# Patient Record
Sex: FEMALE | Race: WHITE | NOT HISPANIC OR LATINO | Employment: FULL TIME | URBAN - METROPOLITAN AREA
[De-identification: names, ages, dates, MRNs, and addresses within clinical notes are randomized per-mention and may not be internally consistent; named-entity substitution may affect disease eponyms.]

---

## 2017-02-07 ENCOUNTER — HISTORICAL (OUTPATIENT)
Dept: INFUSION THERAPY | Facility: HOSPITAL | Age: 52
End: 2017-02-07

## 2017-03-10 ENCOUNTER — HISTORICAL (OUTPATIENT)
Dept: HEMATOLOGY/ONCOLOGY | Facility: CLINIC | Age: 52
End: 2017-03-10

## 2017-04-03 ENCOUNTER — HISTORICAL (OUTPATIENT)
Dept: INFUSION THERAPY | Facility: HOSPITAL | Age: 52
End: 2017-04-03

## 2017-06-05 ENCOUNTER — HISTORICAL (OUTPATIENT)
Dept: INFUSION THERAPY | Facility: HOSPITAL | Age: 52
End: 2017-06-05

## 2017-07-31 ENCOUNTER — HISTORICAL (OUTPATIENT)
Dept: INFUSION THERAPY | Facility: HOSPITAL | Age: 52
End: 2017-07-31

## 2017-09-05 ENCOUNTER — HISTORICAL (OUTPATIENT)
Dept: HEMATOLOGY/ONCOLOGY | Facility: CLINIC | Age: 52
End: 2017-09-05

## 2017-09-05 LAB
ABS NEUT (OLG): 3.16 X10(3)/MCL (ref 2.1–9.2)
ALBUMIN SERPL-MCNC: 3.6 GM/DL (ref 3.4–5)
ALBUMIN/GLOB SERPL: 1.1 RATIO (ref 1.1–2)
ALP SERPL-CCNC: 129 UNIT/L (ref 38–126)
ALT SERPL-CCNC: 21 UNIT/L (ref 12–78)
AST SERPL-CCNC: 19 UNIT/L (ref 15–37)
BASOPHILS # BLD AUTO: 0 X10(3)/MCL (ref 0–0.2)
BASOPHILS NFR BLD AUTO: 0.5 %
BILIRUB SERPL-MCNC: 0.5 MG/DL (ref 0.2–1)
BILIRUBIN DIRECT+TOT PNL SERPL-MCNC: 0.1 MG/DL (ref 0–0.5)
BILIRUBIN DIRECT+TOT PNL SERPL-MCNC: 0.4 MG/DL (ref 0–0.8)
BUN SERPL-MCNC: 15 MG/DL (ref 7–18)
CALCIUM SERPL-MCNC: 9.4 MG/DL (ref 8.5–10.1)
CEA SERPL-MCNC: 2 NG/ML (ref 0–3)
CHLORIDE SERPL-SCNC: 103 MMOL/L (ref 98–107)
CO2 SERPL-SCNC: 29 MMOL/L (ref 21–32)
CREAT SERPL-MCNC: 0.54 MG/DL (ref 0.55–1.02)
EOSINOPHIL # BLD AUTO: 0.2 X10(3)/MCL (ref 0–0.9)
EOSINOPHIL NFR BLD AUTO: 3.8 %
ERYTHROCYTE [DISTWIDTH] IN BLOOD BY AUTOMATED COUNT: 13.6 % (ref 11.5–17)
GLOBULIN SER-MCNC: 3.3 GM/DL (ref 2.4–3.5)
GLUCOSE SERPL-MCNC: 79 MG/DL (ref 74–106)
HCT VFR BLD AUTO: 40.4 % (ref 37–47)
HGB BLD-MCNC: 13.2 GM/DL (ref 12–16)
LYMPHOCYTES # BLD AUTO: 2 X10(3)/MCL (ref 0.6–4.6)
LYMPHOCYTES NFR BLD AUTO: 32.9 %
MCH RBC QN AUTO: 29.9 PG (ref 27–31)
MCHC RBC AUTO-ENTMCNC: 32.7 GM/DL (ref 33–36)
MCV RBC AUTO: 91.4 FL (ref 80–94)
MONOCYTES # BLD AUTO: 0.8 X10(3)/MCL (ref 0.1–1.3)
MONOCYTES NFR BLD AUTO: 12.2 %
NEUTROPHILS # BLD AUTO: 3.2 X10(3)/MCL (ref 2.1–9.2)
NEUTROPHILS NFR BLD AUTO: 50.6 %
PLATELET # BLD AUTO: 357 X10(3)/MCL (ref 130–400)
PMV BLD AUTO: 8.4 FL (ref 9.4–12.4)
POTASSIUM SERPL-SCNC: 4 MMOL/L (ref 3.5–5.1)
PROT SERPL-MCNC: 6.9 GM/DL (ref 6.4–8.2)
RBC # BLD AUTO: 4.42 X10(6)/MCL (ref 4.2–5.4)
SODIUM SERPL-SCNC: 142 MMOL/L (ref 136–145)
WBC # SPEC AUTO: 6.2 X10(3)/MCL (ref 4.5–11.5)

## 2017-11-08 ENCOUNTER — HISTORICAL (OUTPATIENT)
Dept: RADIOLOGY | Facility: HOSPITAL | Age: 52
End: 2017-11-08

## 2018-03-07 ENCOUNTER — HISTORICAL (OUTPATIENT)
Dept: HEMATOLOGY/ONCOLOGY | Facility: CLINIC | Age: 53
End: 2018-03-07

## 2018-03-07 LAB
ABS NEUT (OLG): 2.9 X10(3)/MCL (ref 2.1–9.2)
ALBUMIN SERPL-MCNC: 3.9 GM/DL (ref 3.4–5)
ALBUMIN/GLOB SERPL: 1.1 {RATIO}
ALP SERPL-CCNC: 106 UNIT/L (ref 38–126)
ALT SERPL-CCNC: 26 UNIT/L (ref 12–78)
AST SERPL-CCNC: 16 UNIT/L (ref 15–37)
BASOPHILS # BLD AUTO: 0 X10(3)/MCL (ref 0–0.2)
BASOPHILS NFR BLD AUTO: 0.8 %
BILIRUB SERPL-MCNC: 0.6 MG/DL (ref 0.2–1)
BILIRUBIN DIRECT+TOT PNL SERPL-MCNC: 0.2 MG/DL (ref 0–0.2)
BILIRUBIN DIRECT+TOT PNL SERPL-MCNC: 0.4 MG/DL (ref 0–0.8)
BUN SERPL-MCNC: 14 MG/DL (ref 7–18)
CALCIUM SERPL-MCNC: 9.4 MG/DL (ref 8.5–10.1)
CEA SERPL-MCNC: 2 NG/ML (ref 0–3)
CHLORIDE SERPL-SCNC: 104 MMOL/L (ref 98–107)
CO2 SERPL-SCNC: 32 MMOL/L (ref 21–32)
CREAT SERPL-MCNC: 0.59 MG/DL (ref 0.55–1.02)
EOSINOPHIL # BLD AUTO: 0.2 X10(3)/MCL (ref 0–0.9)
EOSINOPHIL NFR BLD AUTO: 3.6 %
ERYTHROCYTE [DISTWIDTH] IN BLOOD BY AUTOMATED COUNT: 13.9 % (ref 11.5–17)
GLOBULIN SER-MCNC: 3.6 GM/DL (ref 2.4–3.5)
GLUCOSE SERPL-MCNC: 60 MG/DL (ref 74–106)
HCT VFR BLD AUTO: 45.8 % (ref 37–47)
HGB BLD-MCNC: 14.8 GM/DL (ref 12–16)
LYMPHOCYTES # BLD AUTO: 1.6 X10(3)/MCL (ref 0.6–4.6)
LYMPHOCYTES NFR BLD AUTO: 30.4 %
MCH RBC QN AUTO: 30 PG (ref 27–31)
MCHC RBC AUTO-ENTMCNC: 32.3 GM/DL (ref 33–36)
MCV RBC AUTO: 92.7 FL (ref 80–94)
MONOCYTES # BLD AUTO: 0.5 X10(3)/MCL (ref 0.1–1.3)
MONOCYTES NFR BLD AUTO: 10.2 %
NEUTROPHILS # BLD AUTO: 2.9 X10(3)/MCL (ref 2.1–9.2)
NEUTROPHILS NFR BLD AUTO: 55 %
PLATELET # BLD AUTO: 338 X10(3)/MCL (ref 130–400)
PMV BLD AUTO: 8.3 FL (ref 9.4–12.4)
POC CREATININE: 0.7 MG/DL (ref 0.6–1.3)
POTASSIUM SERPL-SCNC: 4.2 MMOL/L (ref 3.5–5.1)
PROT SERPL-MCNC: 7.5 GM/DL (ref 6.4–8.2)
RBC # BLD AUTO: 4.94 X10(6)/MCL (ref 4.2–5.4)
SODIUM SERPL-SCNC: 140 MMOL/L (ref 136–145)
WBC # SPEC AUTO: 5.3 X10(3)/MCL (ref 4.5–11.5)

## 2018-09-04 ENCOUNTER — HISTORICAL (OUTPATIENT)
Dept: HEMATOLOGY/ONCOLOGY | Facility: CLINIC | Age: 53
End: 2018-09-04

## 2018-09-04 LAB
ABS NEUT (OLG): 3.45 X10(3)/MCL (ref 2.1–9.2)
ALBUMIN SERPL-MCNC: 3.8 GM/DL (ref 3.4–5)
ALBUMIN/GLOB SERPL: 1.1 {RATIO}
ALP SERPL-CCNC: 105 UNIT/L (ref 38–126)
ALT SERPL-CCNC: 30 UNIT/L (ref 12–78)
AST SERPL-CCNC: 25 UNIT/L (ref 15–37)
BASOPHILS # BLD AUTO: 0 X10(3)/MCL (ref 0–0.2)
BASOPHILS NFR BLD AUTO: 0.7 %
BILIRUB SERPL-MCNC: 0.5 MG/DL (ref 0.2–1)
BILIRUBIN DIRECT+TOT PNL SERPL-MCNC: 0.1 MG/DL (ref 0–0.2)
BILIRUBIN DIRECT+TOT PNL SERPL-MCNC: 0.4 MG/DL (ref 0–0.8)
BUN SERPL-MCNC: 18 MG/DL (ref 7–18)
CALCIUM SERPL-MCNC: 9.7 MG/DL (ref 8.5–10.1)
CEA SERPL-MCNC: 1.6 NG/ML (ref 0–3)
CHLORIDE SERPL-SCNC: 103 MMOL/L (ref 98–107)
CO2 SERPL-SCNC: 31 MMOL/L (ref 21–32)
CREAT SERPL-MCNC: 0.58 MG/DL (ref 0.55–1.02)
EOSINOPHIL # BLD AUTO: 0.2 X10(3)/MCL (ref 0–0.9)
EOSINOPHIL NFR BLD AUTO: 3.6 %
ERYTHROCYTE [DISTWIDTH] IN BLOOD BY AUTOMATED COUNT: 13.6 % (ref 11.5–17)
GLOBULIN SER-MCNC: 3.5 GM/DL (ref 2.4–3.5)
GLUCOSE SERPL-MCNC: 90 MG/DL (ref 74–106)
HCT VFR BLD AUTO: 41.6 % (ref 37–47)
HGB BLD-MCNC: 13.3 GM/DL (ref 12–16)
LYMPHOCYTES # BLD AUTO: 2.3 X10(3)/MCL (ref 0.6–4.6)
LYMPHOCYTES NFR BLD AUTO: 33 %
MCH RBC QN AUTO: 29.6 PG (ref 27–31)
MCHC RBC AUTO-ENTMCNC: 32 GM/DL (ref 33–36)
MCV RBC AUTO: 92.7 FL (ref 80–94)
MONOCYTES # BLD AUTO: 0.8 X10(3)/MCL (ref 0.1–1.3)
MONOCYTES NFR BLD AUTO: 12.4 %
NEUTROPHILS # BLD AUTO: 3.4 X10(3)/MCL (ref 2.1–9.2)
NEUTROPHILS NFR BLD AUTO: 50.3 %
PLATELET # BLD AUTO: 372 X10(3)/MCL (ref 130–400)
PMV BLD AUTO: 8.5 FL (ref 9.4–12.4)
POTASSIUM SERPL-SCNC: 4 MMOL/L (ref 3.5–5.1)
PROT SERPL-MCNC: 7.3 GM/DL (ref 6.4–8.2)
RBC # BLD AUTO: 4.49 X10(6)/MCL (ref 4.2–5.4)
SODIUM SERPL-SCNC: 139 MMOL/L (ref 136–145)
WBC # SPEC AUTO: 6.9 X10(3)/MCL (ref 4.5–11.5)

## 2018-12-18 ENCOUNTER — HISTORICAL (OUTPATIENT)
Dept: RADIOLOGY | Facility: HOSPITAL | Age: 53
End: 2018-12-18

## 2019-01-08 ENCOUNTER — HISTORICAL (OUTPATIENT)
Dept: RADIOLOGY | Facility: HOSPITAL | Age: 54
End: 2019-01-08

## 2019-03-14 ENCOUNTER — HISTORICAL (OUTPATIENT)
Dept: HEMATOLOGY/ONCOLOGY | Facility: CLINIC | Age: 54
End: 2019-03-14

## 2019-03-14 LAB
ABS NEUT (OLG): 3.23 X10(3)/MCL (ref 2.1–9.2)
ALBUMIN SERPL-MCNC: 3.6 GM/DL (ref 3.4–5)
ALBUMIN/GLOB SERPL: 1 {RATIO}
ALP SERPL-CCNC: 95 UNIT/L (ref 38–126)
ALT SERPL-CCNC: 19 UNIT/L (ref 12–78)
AST SERPL-CCNC: 13 UNIT/L (ref 15–37)
BASOPHILS # BLD AUTO: 0.1 X10(3)/MCL (ref 0–0.2)
BASOPHILS NFR BLD AUTO: 1.2 %
BILIRUB SERPL-MCNC: 0.7 MG/DL (ref 0.2–1)
BILIRUBIN DIRECT+TOT PNL SERPL-MCNC: 0.1 MG/DL (ref 0–0.2)
BILIRUBIN DIRECT+TOT PNL SERPL-MCNC: 0.6 MG/DL (ref 0–0.8)
BUN SERPL-MCNC: 14 MG/DL (ref 7–18)
CALCIUM SERPL-MCNC: 8.9 MG/DL (ref 8.5–10.1)
CEA SERPL-MCNC: 1.2 NG/ML (ref 0–3)
CHLORIDE SERPL-SCNC: 105 MMOL/L (ref 98–107)
CO2 SERPL-SCNC: 29 MMOL/L (ref 21–32)
CREAT SERPL-MCNC: 0.76 MG/DL (ref 0.55–1.02)
EOSINOPHIL # BLD AUTO: 0.2 X10(3)/MCL (ref 0–0.9)
EOSINOPHIL NFR BLD AUTO: 2.7 %
ERYTHROCYTE [DISTWIDTH] IN BLOOD BY AUTOMATED COUNT: 12.8 % (ref 11.5–17)
GLOBULIN SER-MCNC: 3.5 GM/DL (ref 2.4–3.5)
GLUCOSE SERPL-MCNC: 75 MG/DL (ref 74–106)
HCT VFR BLD AUTO: 40.3 % (ref 37–47)
HGB BLD-MCNC: 12.6 GM/DL (ref 12–16)
LYMPHOCYTES # BLD AUTO: 1.8 X10(3)/MCL (ref 0.6–4.6)
LYMPHOCYTES NFR BLD AUTO: 29.7 %
MCH RBC QN AUTO: 28.6 PG (ref 27–31)
MCHC RBC AUTO-ENTMCNC: 31.3 GM/DL (ref 33–36)
MCV RBC AUTO: 91.4 FL (ref 80–94)
MONOCYTES # BLD AUTO: 0.8 X10(3)/MCL (ref 0.1–1.3)
MONOCYTES NFR BLD AUTO: 12.6 %
NEUTROPHILS # BLD AUTO: 3.2 X10(3)/MCL (ref 2.1–9.2)
NEUTROPHILS NFR BLD AUTO: 53.5 %
PLATELET # BLD AUTO: 492 X10(3)/MCL (ref 130–400)
PMV BLD AUTO: 8.1 FL (ref 9.4–12.4)
POC CREATININE: 0.7 MG/DL (ref 0.6–1.3)
POTASSIUM SERPL-SCNC: 3.9 MMOL/L (ref 3.5–5.1)
PROT SERPL-MCNC: 7.1 GM/DL (ref 6.4–8.2)
RBC # BLD AUTO: 4.41 X10(6)/MCL (ref 4.2–5.4)
SODIUM SERPL-SCNC: 141 MMOL/L (ref 136–145)
WBC # SPEC AUTO: 6 X10(3)/MCL (ref 4.5–11.5)

## 2019-06-20 ENCOUNTER — HISTORICAL (OUTPATIENT)
Dept: RADIOLOGY | Facility: HOSPITAL | Age: 54
End: 2019-06-20

## 2019-09-05 ENCOUNTER — HISTORICAL (OUTPATIENT)
Dept: HEMATOLOGY/ONCOLOGY | Facility: CLINIC | Age: 54
End: 2019-09-05

## 2019-09-05 LAB
ABS NEUT (OLG): 3.6 X10(3)/MCL (ref 2.1–9.2)
ALBUMIN SERPL-MCNC: 4.1 GM/DL (ref 3.4–5)
ALBUMIN/GLOB SERPL: 1.3 {RATIO}
ALP SERPL-CCNC: 98 UNIT/L (ref 38–126)
ALT SERPL-CCNC: 27 UNIT/L (ref 12–78)
AST SERPL-CCNC: 19 UNIT/L (ref 15–37)
BASOPHILS # BLD AUTO: 0 X10(3)/MCL (ref 0–0.2)
BASOPHILS NFR BLD AUTO: 0.7 %
BILIRUB SERPL-MCNC: 0.8 MG/DL (ref 0.2–1)
BILIRUBIN DIRECT+TOT PNL SERPL-MCNC: 0.2 MG/DL (ref 0–0.2)
BILIRUBIN DIRECT+TOT PNL SERPL-MCNC: 0.6 MG/DL (ref 0–0.8)
BUN SERPL-MCNC: 16 MG/DL (ref 7–18)
CALCIUM SERPL-MCNC: 10 MG/DL (ref 8.5–10.1)
CEA SERPL-MCNC: 1.4 NG/ML (ref 0–3)
CHLORIDE SERPL-SCNC: 104 MMOL/L (ref 98–107)
CO2 SERPL-SCNC: 29 MMOL/L (ref 21–32)
CREAT SERPL-MCNC: 0.64 MG/DL (ref 0.55–1.02)
EOSINOPHIL # BLD AUTO: 0.2 X10(3)/MCL (ref 0–0.9)
EOSINOPHIL NFR BLD AUTO: 3.6 %
ERYTHROCYTE [DISTWIDTH] IN BLOOD BY AUTOMATED COUNT: 13.1 % (ref 11.5–17)
GLOBULIN SER-MCNC: 3.2 GM/DL (ref 2.4–3.5)
GLUCOSE SERPL-MCNC: 86 MG/DL (ref 74–106)
HCT VFR BLD AUTO: 43.6 % (ref 37–47)
HGB BLD-MCNC: 13.9 GM/DL (ref 12–16)
LYMPHOCYTES # BLD AUTO: 2.3 X10(3)/MCL (ref 0.6–4.6)
LYMPHOCYTES NFR BLD AUTO: 33 %
MCH RBC QN AUTO: 29.4 PG (ref 27–31)
MCHC RBC AUTO-ENTMCNC: 31.9 GM/DL (ref 33–36)
MCV RBC AUTO: 92.4 FL (ref 80–94)
MONOCYTES # BLD AUTO: 0.7 X10(3)/MCL (ref 0.1–1.3)
MONOCYTES NFR BLD AUTO: 9.9 %
NEUTROPHILS # BLD AUTO: 3.6 X10(3)/MCL (ref 2.1–9.2)
NEUTROPHILS NFR BLD AUTO: 52.5 %
PLATELET # BLD AUTO: 359 X10(3)/MCL (ref 130–400)
PMV BLD AUTO: 8.2 FL (ref 9.4–12.4)
POTASSIUM SERPL-SCNC: 4.6 MMOL/L (ref 3.5–5.1)
PROT SERPL-MCNC: 7.3 GM/DL (ref 6.4–8.2)
RBC # BLD AUTO: 4.72 X10(6)/MCL (ref 4.2–5.4)
SODIUM SERPL-SCNC: 139 MMOL/L (ref 136–145)
WBC # SPEC AUTO: 6.9 X10(3)/MCL (ref 4.5–11.5)

## 2020-03-12 ENCOUNTER — HISTORICAL (OUTPATIENT)
Dept: HEMATOLOGY/ONCOLOGY | Facility: CLINIC | Age: 55
End: 2020-03-12

## 2020-03-12 LAB
ABS NEUT (OLG): 3.31 X10(3)/MCL (ref 2.1–9.2)
ALBUMIN SERPL-MCNC: 3.5 GM/DL (ref 3.4–5)
ALBUMIN/GLOB SERPL: 1.1 {RATIO}
ALP SERPL-CCNC: 94 UNIT/L (ref 38–126)
ALT SERPL-CCNC: 26 UNIT/L (ref 12–78)
AST SERPL-CCNC: 16 UNIT/L (ref 15–37)
BASOPHILS # BLD AUTO: 0 X10(3)/MCL (ref 0–0.2)
BASOPHILS NFR BLD AUTO: 0.6 %
BILIRUB SERPL-MCNC: 0.7 MG/DL (ref 0.2–1)
BILIRUBIN DIRECT+TOT PNL SERPL-MCNC: 0.1 MG/DL (ref 0–0.2)
BILIRUBIN DIRECT+TOT PNL SERPL-MCNC: 0.6 MG/DL (ref 0–0.8)
BUN SERPL-MCNC: 14 MG/DL (ref 7–18)
CALCIUM SERPL-MCNC: 8.8 MG/DL (ref 8.5–10.1)
CEA SERPL-MCNC: 1.4 NG/ML (ref 0–3)
CHLORIDE SERPL-SCNC: 105 MMOL/L (ref 98–107)
CO2 SERPL-SCNC: 29 MMOL/L (ref 21–32)
CREAT SERPL-MCNC: 0.56 MG/DL (ref 0.55–1.02)
EOSINOPHIL # BLD AUTO: 0.2 X10(3)/MCL (ref 0–0.9)
EOSINOPHIL NFR BLD AUTO: 3.1 %
ERYTHROCYTE [DISTWIDTH] IN BLOOD BY AUTOMATED COUNT: 13.1 % (ref 11.5–17)
GLOBULIN SER-MCNC: 3.3 GM/DL (ref 2.4–3.5)
GLUCOSE SERPL-MCNC: 91 MG/DL (ref 74–106)
HCT VFR BLD AUTO: 37.2 % (ref 37–47)
HGB BLD-MCNC: 11.9 GM/DL (ref 12–16)
LYMPHOCYTES # BLD AUTO: 2.3 X10(3)/MCL (ref 0.6–4.6)
LYMPHOCYTES NFR BLD AUTO: 35.3 %
MCH RBC QN AUTO: 29.5 PG (ref 27–31)
MCHC RBC AUTO-ENTMCNC: 32 GM/DL (ref 33–36)
MCV RBC AUTO: 92.3 FL (ref 80–94)
MONOCYTES # BLD AUTO: 0.7 X10(3)/MCL (ref 0.1–1.3)
MONOCYTES NFR BLD AUTO: 10.1 %
NEUTROPHILS # BLD AUTO: 3.3 X10(3)/MCL (ref 2.1–9.2)
NEUTROPHILS NFR BLD AUTO: 50.6 %
PLATELET # BLD AUTO: 341 X10(3)/MCL (ref 130–400)
PMV BLD AUTO: 8 FL (ref 9.4–12.4)
POTASSIUM SERPL-SCNC: 4.1 MMOL/L (ref 3.5–5.1)
PROT SERPL-MCNC: 6.8 GM/DL (ref 6.4–8.2)
RBC # BLD AUTO: 4.03 X10(6)/MCL (ref 4.2–5.4)
SODIUM SERPL-SCNC: 138 MMOL/L (ref 136–145)
WBC # SPEC AUTO: 6.5 X10(3)/MCL (ref 4.5–11.5)

## 2021-08-06 ENCOUNTER — HISTORICAL (OUTPATIENT)
Dept: ADMINISTRATIVE | Facility: HOSPITAL | Age: 56
End: 2021-08-06

## 2021-08-06 LAB — SARS-COV-2 RNA RESP QL NAA+PROBE: NOT DETECTED

## 2021-10-05 LAB
PAP RECOMMENDATION EXT: NORMAL
PAP SMEAR: NORMAL

## 2022-11-10 LAB
HUMAN PAPILLOMAVIRUS (HPV): NORMAL
PAP RECOMMENDATION EXT: NORMAL
PAP SMEAR: NORMAL

## 2023-05-12 DIAGNOSIS — Z85.048 PERSONAL HISTORY OF RECTAL CANCER: Primary | ICD-10-CM

## 2023-05-24 NOTE — PROGRESS NOTES
Subjective:       Patient ID: Alondra Snyder is a 58 y.o. female.    Chief Complaint:  Here to reestablish follow-up    Diagnosis: Stage IIIA rectal carcinoma 3/15 (T2 N1b M0); 3/28+ nodes                    + COVID 19 vaccinated     Treatment History  Oxaliplatin/Xeloda x6 completed 8/15  --> Xeloda/XRT completed 11/11/15    Current Treatment: Lost to follow-up     Clinical History: Patient was referred for a first time screening colonoscopy at the age of 49 by her gynecologist.  She had no abdominal symptoms or complaints, changes in her bowel habits, melena or hematochezia.  Colonoscopy revealed a malignant appearing polyp at the rectosigmoid junction.  Biopsy was positive for adenocarcinoma.  Preoperative CEA level was normal 2 ng/mL.  Chest x-ray was unremarkable.  CT A/P showed a large solid left adnexal mass measuring 4.9 x 6.1 cm.  There was no specific abnormality in the sigmoid colon or rectum and no evidence of metastatic disease.  Pelvic ultrasound confirmed that the lesion was a uterine fibroid.  She underwent a laparoscopic resection with primary reanastomosis 3/18/15.  Final pathology showed a 2 cm moderately differentiated adenocarcinoma invading into but not through the muscularis propria.  3 out of 28 lymph nodes were positive for metastatic involvement. All resection margins were clear.  At the time of surgery, the lesion was delineated to be in the upper rectum. She recovered from her surgery uneventfully.  She completed adjuvant chemotherapy and radiation as documented above. Her Mediport catheter was removed 10/17.    Interval History  She returns to the office today by herself to reestablish oncology follow-up.  Her last office visit was 9/16/19.  She did not return for additional appointments due to the COVID-19 pandemic.  She has done well in the interim.  She has had no significant health issues, hospitalizations or surgeries.  She has no abdominal symptoms or complaints, no weight loss.   "No change in bowel habits, no melena or hematochezia.  Screening colonoscopy 1/2/20 showed a single polyp removed from the ascending colon.  Pathology showed benign polypoid colonic mucosa with no evidence of adenoma.  Follow-up exam was recommended in 5 years.  Her annual screening mammogram 1/11/23 showed no suspicious findings.  She has an appointment with Dr. Maher in July to establish primary care.    Review of Systems   Constitutional:  Negative for appetite change, fatigue, fever and unexpected weight change.   HENT:  Negative for mouth sores, sore throat and trouble swallowing.    Eyes: Negative.    Respiratory:  Negative for cough and shortness of breath.    Cardiovascular:  Negative for chest pain, palpitations and leg swelling.   Gastrointestinal:  Negative for abdominal distention, abdominal pain, blood in stool, change in bowel habit, constipation, diarrhea, nausea, vomiting and change in bowel habit.   Genitourinary:  Negative for dysuria, frequency and urgency.   Musculoskeletal:  Negative for arthralgias and back pain.   Integumentary:  Negative for rash and mole/lesion.   Neurological:  Negative for dizziness, weakness, numbness and headaches.   Hematological:  Negative for adenopathy. Does not bruise/bleed easily.   Psychiatric/Behavioral:  Negative for confusion and dysphoric mood. The patient is nervous/anxious.        PMHx:  Endometriosis, anxiety/depression  PSHx:  Tonsils, sinus surgery, right oophorectomy, partial colectomy  SH:  Lifetime nonsmoker, occasional alcohol use.  Lives in [place] with her significant other.  Works as a .  FH:  Mother had kidney cancer.  A maternal aunt and 1st cousin had breast cancer.    Objective:        /67   Pulse 72   Temp 98.6 °F (37 °C)   Resp 15   Ht 5' 7" (1.702 m)   Wt 92.7 kg (204 lb 4.8 oz)   SpO2 99%   BMI 32.00 kg/m²    Physical Exam  Constitutional:       Comments: Well-developed white female in NAD "   HENT:      Head: Normocephalic.      Mouth/Throat:      Mouth: Mucous membranes are moist.      Pharynx: Oropharynx is clear. No posterior oropharyngeal erythema.   Eyes:      Extraocular Movements: Extraocular movements intact.      Conjunctiva/sclera: Conjunctivae normal.      Pupils: Pupils are equal, round, and reactive to light.   Cardiovascular:      Rate and Rhythm: Normal rate and regular rhythm.      Heart sounds: No murmur heard.     Comments: MediPort removal incision left chest wall  Pulmonary:      Breath sounds: Normal breath sounds.   Abdominal:      General: Bowel sounds are normal. There is no distension.      Palpations: Abdomen is soft.      Tenderness: There is no abdominal tenderness.      Comments: Well-healed midline incision below umbilicus and laparoscopic sites. No palpable masses or organomegaly.   Musculoskeletal:         General: No swelling or tenderness. Normal range of motion.      Cervical back: Neck supple. No tenderness.   Lymphadenopathy:      Cervical: No cervical adenopathy.   Skin:     General: Skin is warm and dry.      Findings: No rash.   Neurological:      General: No focal deficit present.      Mental Status: She is alert and oriented to person, place, and time.      Cranial Nerves: No cranial nerve deficit.      Motor: No weakness.     ECOG SCORE    0 - Fully active-able to carry on all pre-disease performance without restriction          LABORATORY  No results found for this or any previous visit (from the past 168 hour(s)).         Assessment:   Stage IIIA rectal carcinoma 3/15 - LOIS      Plan:   Patient has no clinical findings suspicious for disease recurrence.  Surveillance colonoscopy is up-to-date.  Update laboratory testing today: CMP, CBC and CEA level.  RTC in 1 year for a follow-up visit and clinical exam with repeat laboratory.      TIM HARDY MD    Other Physicians  Dr. Jaimee Sanches

## 2023-05-31 ENCOUNTER — DOCUMENTATION ONLY (OUTPATIENT)
Dept: HEMATOLOGY/ONCOLOGY | Facility: CLINIC | Age: 58
End: 2023-05-31
Payer: COMMERCIAL

## 2023-06-01 ENCOUNTER — TELEPHONE (OUTPATIENT)
Dept: HEMATOLOGY/ONCOLOGY | Facility: CLINIC | Age: 58
End: 2023-06-01

## 2023-06-01 ENCOUNTER — OFFICE VISIT (OUTPATIENT)
Dept: HEMATOLOGY/ONCOLOGY | Facility: CLINIC | Age: 58
End: 2023-06-01
Payer: COMMERCIAL

## 2023-06-01 VITALS
SYSTOLIC BLOOD PRESSURE: 115 MMHG | TEMPERATURE: 99 F | RESPIRATION RATE: 15 BRPM | DIASTOLIC BLOOD PRESSURE: 67 MMHG | OXYGEN SATURATION: 99 % | BODY MASS INDEX: 32.07 KG/M2 | HEIGHT: 67 IN | WEIGHT: 204.31 LBS | HEART RATE: 72 BPM

## 2023-06-01 DIAGNOSIS — Z85.048 PERSONAL HISTORY OF RECTAL CANCER: Primary | ICD-10-CM

## 2023-06-01 LAB
ALBUMIN SERPL-MCNC: 4.2 G/DL (ref 3.5–5)
ALBUMIN/GLOB SERPL: 1.2 RATIO (ref 1.1–2)
ALP SERPL-CCNC: 122 UNIT/L (ref 40–150)
ALT SERPL-CCNC: 19 UNIT/L (ref 0–55)
AST SERPL-CCNC: 21 UNIT/L (ref 5–34)
BASOPHILS # BLD AUTO: 0.09 X10(3)/MCL
BASOPHILS NFR BLD AUTO: 1.2 %
BILIRUBIN DIRECT+TOT PNL SERPL-MCNC: 0.7 MG/DL
BUN SERPL-MCNC: 10.4 MG/DL (ref 9.8–20.1)
CALCIUM SERPL-MCNC: 10.1 MG/DL (ref 8.4–10.2)
CEA SERPL-MCNC: 1.94 NG/ML (ref 0–3)
CHLORIDE SERPL-SCNC: 105 MMOL/L (ref 98–107)
CO2 SERPL-SCNC: 26 MMOL/L (ref 22–29)
CREAT SERPL-MCNC: 0.71 MG/DL (ref 0.55–1.02)
EOSINOPHIL # BLD AUTO: 0.28 X10(3)/MCL (ref 0–0.9)
EOSINOPHIL NFR BLD AUTO: 3.7 %
ERYTHROCYTE [DISTWIDTH] IN BLOOD BY AUTOMATED COUNT: 13.7 % (ref 11.5–17)
GFR SERPLBLD CREATININE-BSD FMLA CKD-EPI: >60 MLS/MIN/1.73/M2
GLOBULIN SER-MCNC: 3.4 GM/DL (ref 2.4–3.5)
GLUCOSE SERPL-MCNC: 97 MG/DL (ref 74–100)
HCT VFR BLD AUTO: 45.6 % (ref 37–47)
HGB BLD-MCNC: 14.2 G/DL (ref 12–16)
IMM GRANULOCYTES # BLD AUTO: 0.02 X10(3)/MCL (ref 0–0.04)
IMM GRANULOCYTES NFR BLD AUTO: 0.3 %
LYMPHOCYTES # BLD AUTO: 2.52 X10(3)/MCL (ref 0.6–4.6)
LYMPHOCYTES NFR BLD AUTO: 33.2 %
MCH RBC QN AUTO: 28.6 PG (ref 27–31)
MCHC RBC AUTO-ENTMCNC: 31.1 G/DL (ref 33–36)
MCV RBC AUTO: 91.8 FL (ref 80–94)
MONOCYTES # BLD AUTO: 0.74 X10(3)/MCL (ref 0.1–1.3)
MONOCYTES NFR BLD AUTO: 9.8 %
NEUTROPHILS # BLD AUTO: 3.93 X10(3)/MCL (ref 2.1–9.2)
NEUTROPHILS NFR BLD AUTO: 51.8 %
PLATELET # BLD AUTO: 409 X10(3)/MCL (ref 130–400)
PMV BLD AUTO: 8.1 FL (ref 7.4–10.4)
POTASSIUM SERPL-SCNC: 4.5 MMOL/L (ref 3.5–5.1)
PROT SERPL-MCNC: 7.6 GM/DL (ref 6.4–8.3)
RBC # BLD AUTO: 4.97 X10(6)/MCL (ref 4.2–5.4)
SODIUM SERPL-SCNC: 140 MMOL/L (ref 136–145)
WBC # SPEC AUTO: 7.58 X10(3)/MCL (ref 4.5–11.5)

## 2023-06-01 PROCEDURE — 99999 PR PBB SHADOW E&M-EST. PATIENT-LVL IV: ICD-10-PCS | Mod: PBBFAC,,, | Performed by: INTERNAL MEDICINE

## 2023-06-01 PROCEDURE — 3078F PR MOST RECENT DIASTOLIC BLOOD PRESSURE < 80 MM HG: ICD-10-PCS | Mod: CPTII,S$GLB,, | Performed by: INTERNAL MEDICINE

## 2023-06-01 PROCEDURE — 99214 OFFICE O/P EST MOD 30 MIN: CPT | Mod: S$GLB,,, | Performed by: INTERNAL MEDICINE

## 2023-06-01 PROCEDURE — 3078F DIAST BP <80 MM HG: CPT | Mod: CPTII,S$GLB,, | Performed by: INTERNAL MEDICINE

## 2023-06-01 PROCEDURE — 85025 COMPLETE CBC W/AUTO DIFF WBC: CPT | Performed by: INTERNAL MEDICINE

## 2023-06-01 PROCEDURE — 3008F BODY MASS INDEX DOCD: CPT | Mod: CPTII,S$GLB,, | Performed by: INTERNAL MEDICINE

## 2023-06-01 PROCEDURE — 1159F MED LIST DOCD IN RCRD: CPT | Mod: CPTII,S$GLB,, | Performed by: INTERNAL MEDICINE

## 2023-06-01 PROCEDURE — 1159F PR MEDICATION LIST DOCUMENTED IN MEDICAL RECORD: ICD-10-PCS | Mod: CPTII,S$GLB,, | Performed by: INTERNAL MEDICINE

## 2023-06-01 PROCEDURE — 1160F RVW MEDS BY RX/DR IN RCRD: CPT | Mod: CPTII,S$GLB,, | Performed by: INTERNAL MEDICINE

## 2023-06-01 PROCEDURE — 1160F PR REVIEW ALL MEDS BY PRESCRIBER/CLIN PHARMACIST DOCUMENTED: ICD-10-PCS | Mod: CPTII,S$GLB,, | Performed by: INTERNAL MEDICINE

## 2023-06-01 PROCEDURE — 99999 PR PBB SHADOW E&M-EST. PATIENT-LVL IV: CPT | Mod: PBBFAC,,, | Performed by: INTERNAL MEDICINE

## 2023-06-01 PROCEDURE — 80053 COMPREHEN METABOLIC PANEL: CPT | Performed by: INTERNAL MEDICINE

## 2023-06-01 PROCEDURE — 3008F PR BODY MASS INDEX (BMI) DOCUMENTED: ICD-10-PCS | Mod: CPTII,S$GLB,, | Performed by: INTERNAL MEDICINE

## 2023-06-01 PROCEDURE — 3074F PR MOST RECENT SYSTOLIC BLOOD PRESSURE < 130 MM HG: ICD-10-PCS | Mod: CPTII,S$GLB,, | Performed by: INTERNAL MEDICINE

## 2023-06-01 PROCEDURE — 3074F SYST BP LT 130 MM HG: CPT | Mod: CPTII,S$GLB,, | Performed by: INTERNAL MEDICINE

## 2023-06-01 PROCEDURE — 36415 COLL VENOUS BLD VENIPUNCTURE: CPT | Performed by: INTERNAL MEDICINE

## 2023-06-01 PROCEDURE — 82378 CARCINOEMBRYONIC ANTIGEN: CPT | Performed by: INTERNAL MEDICINE

## 2023-06-01 PROCEDURE — 99214 PR OFFICE/OUTPT VISIT, EST, LEVL IV, 30-39 MIN: ICD-10-PCS | Mod: S$GLB,,, | Performed by: INTERNAL MEDICINE

## 2023-06-01 RX ORDER — SERTRALINE HYDROCHLORIDE 100 MG/1
100 TABLET, FILM COATED ORAL DAILY
COMMUNITY
Start: 2023-03-29

## 2023-06-28 ENCOUNTER — TELEPHONE (OUTPATIENT)
Dept: ADMINISTRATIVE | Facility: HOSPITAL | Age: 58
End: 2023-06-28
Payer: COMMERCIAL

## 2023-06-28 ENCOUNTER — DOCUMENTATION ONLY (OUTPATIENT)
Dept: ADMINISTRATIVE | Facility: HOSPITAL | Age: 58
End: 2023-06-28
Payer: COMMERCIAL

## 2023-06-28 NOTE — TELEPHONE ENCOUNTER
----- Message from Mendy Cade MA sent at 6/27/2023  4:30 PM CDT -----  Regarding: Dr KARTHIKEYAN VILLAREAL Thursday 7-6-23       1. Are there any outstanding Labs, imaging or referrals in the patient's chart?      New Patient     No labs required    Please bring a list of Medications (mg and directions), Medical History, Surgical History, Family History, Allergy list, Immunizations record, Names of all past doctors we may need to request records from.             2. . Has the patient been seen in an ER, urgent care clinic, or any other health care    provider since their last visit? If yes when and where?

## 2023-07-06 ENCOUNTER — OFFICE VISIT (OUTPATIENT)
Dept: INTERNAL MEDICINE | Facility: CLINIC | Age: 58
End: 2023-07-06
Payer: COMMERCIAL

## 2023-07-06 VITALS — WEIGHT: 203 LBS | BODY MASS INDEX: 31.86 KG/M2 | HEIGHT: 67 IN

## 2023-07-06 DIAGNOSIS — Z76.89 ESTABLISHING CARE WITH NEW DOCTOR, ENCOUNTER FOR: ICD-10-CM

## 2023-07-06 DIAGNOSIS — F41.1 GENERALIZED ANXIETY DISORDER: ICD-10-CM

## 2023-07-06 PROCEDURE — 99204 PR OFFICE/OUTPT VISIT, NEW, LEVL IV, 45-59 MIN: ICD-10-PCS | Mod: ,,, | Performed by: INTERNAL MEDICINE

## 2023-07-06 PROCEDURE — 3008F BODY MASS INDEX DOCD: CPT | Mod: CPTII,,, | Performed by: INTERNAL MEDICINE

## 2023-07-06 PROCEDURE — 99204 OFFICE O/P NEW MOD 45 MIN: CPT | Mod: ,,, | Performed by: INTERNAL MEDICINE

## 2023-07-06 PROCEDURE — 1159F MED LIST DOCD IN RCRD: CPT | Mod: CPTII,,, | Performed by: INTERNAL MEDICINE

## 2023-07-06 PROCEDURE — 3008F PR BODY MASS INDEX (BMI) DOCUMENTED: ICD-10-PCS | Mod: CPTII,,, | Performed by: INTERNAL MEDICINE

## 2023-07-06 PROCEDURE — 1159F PR MEDICATION LIST DOCUMENTED IN MEDICAL RECORD: ICD-10-PCS | Mod: CPTII,,, | Performed by: INTERNAL MEDICINE

## 2023-07-06 PROCEDURE — 1160F RVW MEDS BY RX/DR IN RCRD: CPT | Mod: CPTII,,, | Performed by: INTERNAL MEDICINE

## 2023-07-06 PROCEDURE — 1160F PR REVIEW ALL MEDS BY PRESCRIBER/CLIN PHARMACIST DOCUMENTED: ICD-10-PCS | Mod: CPTII,,, | Performed by: INTERNAL MEDICINE

## 2023-07-06 RX ORDER — CHOLECALCIFEROL (VITAMIN D3) 25 MCG
1000 TABLET ORAL DAILY
COMMUNITY

## 2023-07-06 NOTE — PROGRESS NOTES
Alondra Snyder is a 58 y.o. female who presents today for her first annual wellness visit. Her eligibility for this visit has been confirmed.         Past/personal medical history, Active problem list, family history, social history, allergies and medication list have all been reviewed, updated and documented during this encounter. Also a list of all current physicians and/or suppliers of medical care has been updated in the Care Teams section of this encounter.         PHQ9 questionnaire has been completed for this patient and it is located in the flow sheets section of this encounter.  The patient completed an HRA before today's visit. The HRA was discussed with her today and this information was used to formulate our plan and recommendations as listed below. A copy of this HRA was scanned and attached to today's encounter.         Other tests done today and results:  1. Whisper test - Normal  2. Get up and go test - Normal  3. MiniCog test (results scanned into chart) - Normal         There were no vitals filed for this visit.  There is no height or weight on file to calculate BMI.         Diagnoses and health risks identified today and associated recommendations/orders:  There are no diagnoses linked to this encounter.         A personalized 5-10 year written screening schedule and personal prevention plan has been developed using the USPSTF age appropriate recommendations and this was provided to the patient at the end of today's visit. Please see the AVS for those details.  The health maintenance module has been updated during this encounter.  No follow-ups on file.

## 2023-07-06 NOTE — LETTER
AUTHORIZATION FOR RELEASE OF   CONFIDENTIAL INFORMATION    Dear Dr Sanches    We are seeing Alondra Snyder, date of birth 1965, in the clinic at [place] [address] [facility]. Jaimee Maher MD is the patient's PCP. Alondra Snyder has an outstanding lab/procedure at the time we reviewed her chart. In order to help keep her health information updated, she has authorized us to request the following medical record(s):        (  )  MAMMOGRAM                                      (  )  COLONOSCOPY      (X)  PAP SMEAR                                          (  )  OUTSIDE LAB RESULTS     (  )  DEXA SCAN                                          (  )  EYE EXAM            (  )  FOOT EXAM                                          (  )  ENTIRE RECORD     (  )  OUTSIDE IMMUNIZATIONS                 (  )  _______________         Please fax records to [facility], Reshma Arun Bhanushali, MD, [phone]            Patient Name: Alondra Snyder  : 1965  Patient Phone #: [phone]

## 2023-07-21 ENCOUNTER — PATIENT OUTREACH (OUTPATIENT)
Dept: ADMINISTRATIVE | Facility: HOSPITAL | Age: 58
End: 2023-07-21
Payer: COMMERCIAL

## 2023-07-21 NOTE — PROGRESS NOTES
Population Health Outreach.   The following record(s)  below were uploaded for Health Maintenance .          PAP SMEAR 11/10/2022

## 2023-11-06 ENCOUNTER — OFFICE VISIT (OUTPATIENT)
Dept: URGENT CARE | Facility: CLINIC | Age: 58
End: 2023-11-06
Payer: COMMERCIAL

## 2023-11-06 VITALS
RESPIRATION RATE: 18 BRPM | SYSTOLIC BLOOD PRESSURE: 113 MMHG | HEIGHT: 67 IN | TEMPERATURE: 98 F | DIASTOLIC BLOOD PRESSURE: 79 MMHG | WEIGHT: 201 LBS | HEART RATE: 71 BPM | OXYGEN SATURATION: 96 % | BODY MASS INDEX: 31.55 KG/M2

## 2023-11-06 DIAGNOSIS — J32.9 BACTERIAL SINUSITIS: Primary | ICD-10-CM

## 2023-11-06 DIAGNOSIS — B96.89 BACTERIAL SINUSITIS: Primary | ICD-10-CM

## 2023-11-06 PROCEDURE — 99213 PR OFFICE/OUTPT VISIT, EST, LEVL III, 20-29 MIN: ICD-10-PCS | Mod: ,,, | Performed by: FAMILY MEDICINE

## 2023-11-06 PROCEDURE — 99213 OFFICE O/P EST LOW 20 MIN: CPT | Mod: ,,, | Performed by: FAMILY MEDICINE

## 2023-11-06 RX ORDER — AMOXICILLIN AND CLAVULANATE POTASSIUM 875; 125 MG/1; MG/1
1 TABLET, FILM COATED ORAL EVERY 12 HOURS
Qty: 14 TABLET | Refills: 0 | Status: SHIPPED | OUTPATIENT
Start: 2023-11-06 | End: 2023-11-13

## 2023-11-06 RX ORDER — PROMETHAZINE HYDROCHLORIDE AND DEXTROMETHORPHAN HYDROBROMIDE 6.25; 15 MG/5ML; MG/5ML
5 SYRUP ORAL EVERY 4 HOURS PRN
Qty: 120 ML | Refills: 0 | Status: SHIPPED | OUTPATIENT
Start: 2023-11-06 | End: 2023-11-10

## 2023-11-06 NOTE — PATIENT INSTRUCTIONS
Please take Augmentin twice daily for 7 days    Recommend over-the-counter saline spray followed by over-the-counter steroid spray for the nose    Please take promethazine cough syrup as needed every 4-6 hours for cough.  Please note this medication may make you drowsy.    Drink plenty of fluids.      Get plenty of rest.      Tylenol or Motrin as needed.      Go to the ER with any significant change or worsening of symptoms.     Follow up with your primary care doctor.

## 2023-11-06 NOTE — PROGRESS NOTES
Patient ID: [id number]     Chief Complaint: upper respiratory tract infection symptoms    History of Present Illness:     Alondra Snyder is a 58 y.o. female  with a history of anxiety, sinus surgery, rectal malignancy, who presents today for symptoms of Nasal Congestion (Patient presents to the clinic with c/o nasal congestion, cough, sinus pressure, runny nose x 2 weeks. Declines testing)      Pt denies experiencing any fevers, chills, nausea, vomiting, difficulty breathing, dysphagia, or neck stiffness.    Past Medical History:     ----------------------------  Anxiety disorder, unspecified  Carpal tunnel syndrome  Malignant neoplasm of rectum  Rectal carcinoma     Past Surgical History:   Procedure Laterality Date    ADENOIDECTOMY  1968    BREAST SURGERY  2010    benign cyst removed    COLON RESECTION      COLON SURGERY  2015    COLONOSCOPY      removal of right ovary Right     SINUS SURGERY      TONSILLECTOMY  1968       Review of patient's allergies indicates:  No Known Allergies    Outpatient Medications Marked as Taking for the 11/6/23 encounter (Office Visit) with Derrell Douglas MD   Medication Sig Dispense Refill    multivitamin capsule Take 1 capsule by mouth once daily.      sertraline (ZOLOFT) 100 MG tablet Take 100 mg by mouth once daily.      vitamin D (VITAMIN D3) 1000 units Tab Take 1,000 Units by mouth once daily.         Social History     Socioeconomic History    Marital status:    Tobacco Use    Smoking status: Never    Smokeless tobacco: Never   Substance and Sexual Activity    Alcohol use: Yes     Alcohol/week: 2.0 standard drinks of alcohol     Types: 2 Cans of beer per week     Social Determinants of Health     Financial Resource Strain: Low Risk  (7/6/2023)    Overall Financial Resource Strain (CARDIA)     Difficulty of Paying Living Expenses: Not hard at all   Food Insecurity: No Food Insecurity (7/6/2023)    Hunger Vital Sign     Worried About Running Out of Food in the Last  Year: Never true     Ran Out of Food in the Last Year: Never true   Transportation Needs: No Transportation Needs (7/6/2023)    PRAPARE - Transportation     Lack of Transportation (Medical): No     Lack of Transportation (Non-Medical): No   Physical Activity: Sufficiently Active (7/6/2023)    Exercise Vital Sign     Days of Exercise per Week: 7 days     Minutes of Exercise per Session: 60 min   Stress: No Stress Concern Present (7/6/2023)    [place] of [employer]ational [employer] - Occupational Stress Questionnaire     Feeling of Stress : Not at all   Social Connections: Moderately Integrated (7/6/2023)    Social Connection and Isolation Panel [NHANES]     Frequency of Communication with Friends and Family: More than three times a week     Frequency of Social Gatherings with Friends and Family: More than three times a week     Attends [employer]ism Services: More than 4 times per year     Active Member of Clubs or Organizations: No     Attends Club or Organization Meetings: Never     Marital Status:    Housing Stability: Low Risk  (7/6/2023)    Housing Stability Vital Sign     Unable to Pay for Housing in the Last Year: No     Number of Places Lived in the Last Year: 1     Unstable Housing in the Last Year: No        Family History   Problem Relation Age of Onset    Cancer Mother         renal    Early death Father     Cirrhosis Father     Diabetes Brother         Subjective:     Review of Systems   Constitutional:  Negative for chills, fever and malaise/fatigue.   HENT:  Positive for congestion and sinus pain. Negative for ear discharge, ear pain and sore throat.    Respiratory:  Positive for cough. Negative for sputum production, shortness of breath, wheezing and stridor.    Gastrointestinal:  Negative for abdominal pain, diarrhea, nausea and vomiting.   Genitourinary:  Negative for dysuria, frequency and urgency.   Musculoskeletal:  Negative for neck pain.   Skin:  Negative for rash.   Neurological:   Negative for headaches.       Objective:     Vitals:    11/06/23 1441   BP: 113/79   Pulse: 71   Resp: 18   Temp: 98.3 °F (36.8 °C)     Body mass index is 31.48 kg/m².    Physical Exam  Constitutional:       General: She is not in acute distress.     Appearance: Normal appearance. She is not ill-appearing or toxic-appearing.   HENT:      Head: Normocephalic and atraumatic.      Right Ear: Tympanic membrane and ear canal normal.      Left Ear: Tympanic membrane and ear canal normal.      Nose: No congestion or rhinorrhea.      Mouth/Throat:      Pharynx: Oropharynx is clear. No oropharyngeal exudate or posterior oropharyngeal erythema.      Comments: Cobblestoning  Eyes:      General:         Right eye: No discharge.         Left eye: No discharge.      Extraocular Movements: Extraocular movements intact.      Conjunctiva/sclera: Conjunctivae normal.   Cardiovascular:      Rate and Rhythm: Normal rate and regular rhythm.      Heart sounds: Normal heart sounds. No murmur heard.     No gallop.   Pulmonary:      Effort: Pulmonary effort is normal. No respiratory distress.      Breath sounds: Normal breath sounds. No stridor. No wheezing, rhonchi or rales.   Chest:      Chest wall: No tenderness.   Musculoskeletal:      Cervical back: No rigidity or tenderness.   Lymphadenopathy:      Cervical: No cervical adenopathy.   Neurological:      Mental Status: She is alert and oriented to person, place, and time. Mental status is at baseline.   Psychiatric:         Mood and Affect: Mood normal.         Behavior: Behavior normal.         Assessment & Plan:       ICD-10-CM ICD-9-CM   1. Bacterial sinusitis  J32.9 473.9    B96.89 041.9        1. Bacterial sinusitis    Other orders  -     promethazine-dextromethorphan (PROMETHAZINE-DM) 6.25-15 mg/5 mL Syrp; Take 5 mLs by mouth every 4 (four) hours as needed.  Dispense: 120 mL; Refill: 0  -     amoxicillin-clavulanate 875-125mg (AUGMENTIN) 875-125 mg per tablet; Take 1 tablet by  mouth every 12 (twelve) hours. for 7 days  Dispense: 14 tablet; Refill: 0         Qualifies for Augmentin therapy for acute bacterial sinusitis because her sinus pain has been going on for longer than 10 days, pursuant to Infectious Disease society of [place] guidelines.  Moreover she has had sinus surgery and is at a higher risk for bacterial infection.  Discussed that etiology is still nevertheless more likely to be viral.  Unfortunately can not give her steroids today because she had a flu vaccine in the past week.  She would like a cough suppressant for a cough which is likely postnasal any etiology so cough medicine may not help much.  Follow-up in ER precautions given

## 2023-12-22 DIAGNOSIS — Z00.00 WELLNESS EXAMINATION: Primary | ICD-10-CM

## 2024-01-02 ENCOUNTER — TELEPHONE (OUTPATIENT)
Dept: INTERNAL MEDICINE | Facility: CLINIC | Age: 59
End: 2024-01-02
Payer: COMMERCIAL

## 2024-01-04 ENCOUNTER — LAB VISIT (OUTPATIENT)
Dept: LAB | Facility: HOSPITAL | Age: 59
End: 2024-01-04
Attending: INTERNAL MEDICINE
Payer: COMMERCIAL

## 2024-01-04 DIAGNOSIS — Z00.00 WELLNESS EXAMINATION: ICD-10-CM

## 2024-01-04 LAB
ALBUMIN SERPL-MCNC: 3.9 G/DL (ref 3.5–5)
ALBUMIN/GLOB SERPL: 1.2 RATIO (ref 1.1–2)
ALP SERPL-CCNC: 118 UNIT/L (ref 40–150)
ALT SERPL-CCNC: 14 UNIT/L (ref 0–55)
AST SERPL-CCNC: 16 UNIT/L (ref 5–34)
BASOPHILS # BLD AUTO: 0.12 X10(3)/MCL
BASOPHILS NFR BLD AUTO: 1.4 %
BILIRUB SERPL-MCNC: 1.2 MG/DL
BUN SERPL-MCNC: 14.8 MG/DL (ref 9.8–20.1)
CALCIUM SERPL-MCNC: 9.4 MG/DL (ref 8.4–10.2)
CHLORIDE SERPL-SCNC: 105 MMOL/L (ref 98–107)
CHOLEST SERPL-MCNC: 247 MG/DL
CHOLEST/HDLC SERPL: 5 {RATIO} (ref 0–5)
CO2 SERPL-SCNC: 28 MMOL/L (ref 22–29)
CREAT SERPL-MCNC: 0.63 MG/DL (ref 0.55–1.02)
EOSINOPHIL # BLD AUTO: 0.3 X10(3)/MCL (ref 0–0.9)
EOSINOPHIL NFR BLD AUTO: 3.6 %
ERYTHROCYTE [DISTWIDTH] IN BLOOD BY AUTOMATED COUNT: 13.5 % (ref 11.5–17)
EST. AVERAGE GLUCOSE BLD GHB EST-MCNC: 116.9 MG/DL
GFR SERPLBLD CREATININE-BSD FMLA CKD-EPI: >60 MLS/MIN/1.73/M2
GLOBULIN SER-MCNC: 3.3 GM/DL (ref 2.4–3.5)
GLUCOSE SERPL-MCNC: 82 MG/DL (ref 74–100)
HBA1C MFR BLD: 5.7 %
HCT VFR BLD AUTO: 42.9 % (ref 37–47)
HDLC SERPL-MCNC: 45 MG/DL (ref 35–60)
HGB BLD-MCNC: 13.8 G/DL (ref 12–16)
IMM GRANULOCYTES # BLD AUTO: 0.03 X10(3)/MCL (ref 0–0.04)
IMM GRANULOCYTES NFR BLD AUTO: 0.4 %
LDLC SERPL CALC-MCNC: 189 MG/DL (ref 50–140)
LYMPHOCYTES # BLD AUTO: 2.38 X10(3)/MCL (ref 0.6–4.6)
LYMPHOCYTES NFR BLD AUTO: 28.3 %
MCH RBC QN AUTO: 28.5 PG (ref 27–31)
MCHC RBC AUTO-ENTMCNC: 32.2 G/DL (ref 33–36)
MCV RBC AUTO: 88.6 FL (ref 80–94)
MONOCYTES # BLD AUTO: 0.84 X10(3)/MCL (ref 0.1–1.3)
MONOCYTES NFR BLD AUTO: 10 %
NEUTROPHILS # BLD AUTO: 4.75 X10(3)/MCL (ref 2.1–9.2)
NEUTROPHILS NFR BLD AUTO: 56.3 %
NRBC BLD AUTO-RTO: 0 %
PLATELET # BLD AUTO: 457 X10(3)/MCL (ref 130–400)
PMV BLD AUTO: 8.2 FL (ref 7.4–10.4)
POTASSIUM SERPL-SCNC: 4.5 MMOL/L (ref 3.5–5.1)
PROT SERPL-MCNC: 7.2 GM/DL (ref 6.4–8.3)
RBC # BLD AUTO: 4.84 X10(6)/MCL (ref 4.2–5.4)
SODIUM SERPL-SCNC: 138 MMOL/L (ref 136–145)
TRIGL SERPL-MCNC: 66 MG/DL (ref 37–140)
TSH SERPL-ACNC: 1.79 UIU/ML (ref 0.35–4.94)
VLDLC SERPL CALC-MCNC: 13 MG/DL
WBC # SPEC AUTO: 8.42 X10(3)/MCL (ref 4.5–11.5)

## 2024-01-04 PROCEDURE — 36415 COLL VENOUS BLD VENIPUNCTURE: CPT

## 2024-01-04 PROCEDURE — 85025 COMPLETE CBC W/AUTO DIFF WBC: CPT

## 2024-01-04 PROCEDURE — 84443 ASSAY THYROID STIM HORMONE: CPT

## 2024-01-04 PROCEDURE — 83036 HEMOGLOBIN GLYCOSYLATED A1C: CPT

## 2024-01-04 PROCEDURE — 80061 LIPID PANEL: CPT

## 2024-01-04 PROCEDURE — 80053 COMPREHEN METABOLIC PANEL: CPT

## 2024-01-05 NOTE — PROGRESS NOTES
Subjective:      Patient ID: Alondra Snyder is a 58 y.o. female.    Chief Complaint: Annual Exam (Wellness/)    Ms. Boles is a 58-year-old female who is here today for her annual wellness visit.  Her history is significant for stage IIIA rectal carcinoma.  She has completed treatment with chemotherapy for 6 cycles followed by Xeloda and XRT and has been in remission and being followed closely. Undergoes surveillance colonoscopy.    Wellness labs are reviewed and noted to be all essentially normal except for elevated total cholesterol and LDL for which we discussed getting a started on a low-dose of statin and she is in agreement.      The patient's Health Maintenance was reviewed and the following appears to be due at this time:   Health Maintenance Due   Topic Date Due    Hepatitis C Screening  Never done    Shingles Vaccine (1 of 2) Never done    Influenza Vaccine (1) 09/01/2023    COVID-19 Vaccine (4 - 2023-24 season) 09/01/2023    Mammogram  01/11/2024        Past Medical History:  Past Medical History:   Diagnosis Date    Anxiety disorder, unspecified     Carpal tunnel syndrome     Malignant neoplasm of rectum     Rectal carcinoma      Past Surgical History:   Procedure Laterality Date    ADENOIDECTOMY  1968    BREAST SURGERY  2010    benign cyst removed    COLON RESECTION      COLON SURGERY  2015    COLONOSCOPY      removal of right ovary Right     SINUS SURGERY      TONSILLECTOMY  1968     Review of patient's allergies indicates:  No Known Allergies  Social History     Socioeconomic History    Marital status:    Tobacco Use    Smoking status: Never    Smokeless tobacco: Never   Substance and Sexual Activity    Alcohol use: Yes     Alcohol/week: 2.0 standard drinks of alcohol     Types: 2 Cans of beer per week     Social Determinants of Health     Financial Resource Strain: Low Risk  (7/6/2023)    Overall Financial Resource Strain (CARDIA)     Difficulty of Paying Living Expenses: Not hard at all  "  Food Insecurity: No Food Insecurity (7/6/2023)    Hunger Vital Sign     Worried About Running Out of Food in the Last Year: Never true     Ran Out of Food in the Last Year: Never true   Transportation Needs: No Transportation Needs (7/6/2023)    PRAPARE - Transportation     Lack of Transportation (Medical): No     Lack of Transportation (Non-Medical): No   Physical Activity: Sufficiently Active (7/6/2023)    Exercise Vital Sign     Days of Exercise per Week: 7 days     Minutes of Exercise per Session: 60 min   Stress: No Stress Concern Present (7/6/2023)    [place] of Occupational Health - Occupational Stress Questionnaire     Feeling of Stress : Not at all   Social Connections: Moderately Integrated (7/6/2023)    Social Connection and Isolation Panel [NHANES]     Frequency of Communication with Friends and Family: More than three times a week     Frequency of Social Gatherings with Friends and Family: More than three times a week     Attends Tenriism Services: More than 4 times per year     Active Member of Clubs or Organizations: No     Attends Club or Organization Meetings: Never     Marital Status:    Housing Stability: Low Risk  (7/6/2023)    Housing Stability Vital Sign     Unable to Pay for Housing in the Last Year: No     Number of Places Lived in the Last Year: 1     Unstable Housing in the Last Year: No     Family History   Problem Relation Age of Onset    Cancer Mother         renal    Early death Father     Cirrhosis Father     Diabetes Brother        Review of Systems    A comprehensive review of systems was performed and is negative except for that stated above  Objective:   /70 (BP Location: Left arm, Patient Position: Sitting, BP Method: Large (Manual))   Pulse 86   Ht 5' 7.01" (1.702 m)   Wt 88.6 kg (195 lb 6.4 oz)   SpO2 97%   BMI 30.60 kg/m²     Physical Exam  Constitutional:       Appearance: Normal appearance.   HENT:      Head: Normocephalic and atraumatic.      " Nose: Nose normal.      Mouth/Throat:      Mouth: Mucous membranes are moist.      Pharynx: Oropharynx is clear.   Eyes:      Extraocular Movements: Extraocular movements intact.      Pupils: Pupils are equal, round, and reactive to light.   Cardiovascular:      Rate and Rhythm: Normal rate and regular rhythm.      Pulses: Normal pulses.   Pulmonary:      Effort: Pulmonary effort is normal.      Breath sounds: Normal breath sounds.   Abdominal:      General: Bowel sounds are normal.      Palpations: Abdomen is soft.   Musculoskeletal:         General: Normal range of motion.      Cervical back: Normal range of motion and neck supple.   Skin:     General: Skin is warm.   Neurological:      General: No focal deficit present.      Mental Status: She is alert and oriented to person, place, and time. Mental status is at baseline.   Psychiatric:         Mood and Affect: Mood normal.       Assessment/ Plan:   1. Wellness examination  Assessment & Plan:  -labs are reviewed all essentially normal except for elevated total cholesterol and LDL  -patient is advised on importance of watching her carbohydrate intake and saturated fat intake, making the right nutritional choices and exercising on a regular basis  -up-to-date with the screening       2. Generalized anxiety disorder  Assessment & Plan:  -doing well with sertraline 100 mg p.o. daily, continue      3. Mixed hyperlipidemia  Assessment & Plan:  Initiating patient on a low-dose of Crestor  Stressed importance of dietary modifications. Follow a low cholesterol, low saturated fat diet with less that 200mg of cholesterol a day.  Avoid fried foods and high saturated fats (high saturated fats less than 7% of calories).  Add Flax Seed/Fish Oil supplements to diet. Increase dietary fiber.  Regular exercise can reduce LDL and raise HDL. Stressed importance of physical activity 5 times per week for 30 minutes per day.        4. History of malignant carcinoid tumor of  rectum  Assessment & Plan:  -followed by Oncology yearly with surveillance      Other orders  -     rosuvastatin (CRESTOR) 10 MG tablet; Take 1 tablet (10 mg total) by mouth once daily.  Dispense: 90 tablet; Refill: 3

## 2024-01-08 ENCOUNTER — OFFICE VISIT (OUTPATIENT)
Dept: INTERNAL MEDICINE | Facility: CLINIC | Age: 59
End: 2024-01-08
Payer: COMMERCIAL

## 2024-01-08 VITALS
BODY MASS INDEX: 30.66 KG/M2 | SYSTOLIC BLOOD PRESSURE: 126 MMHG | HEIGHT: 67 IN | WEIGHT: 195.38 LBS | DIASTOLIC BLOOD PRESSURE: 70 MMHG | HEART RATE: 86 BPM | OXYGEN SATURATION: 97 %

## 2024-01-08 DIAGNOSIS — E78.2 MIXED HYPERLIPIDEMIA: ICD-10-CM

## 2024-01-08 DIAGNOSIS — F41.1 GENERALIZED ANXIETY DISORDER: ICD-10-CM

## 2024-01-08 DIAGNOSIS — Z85.040 HISTORY OF MALIGNANT CARCINOID TUMOR OF RECTUM: ICD-10-CM

## 2024-01-08 DIAGNOSIS — Z00.00 WELLNESS EXAMINATION: Primary | ICD-10-CM

## 2024-01-08 PROBLEM — C20 MALIGNANT NEOPLASM OF RECTUM: Status: ACTIVE | Noted: 2024-01-08

## 2024-01-08 PROCEDURE — 3074F SYST BP LT 130 MM HG: CPT | Mod: CPTII,,, | Performed by: INTERNAL MEDICINE

## 2024-01-08 PROCEDURE — 3044F HG A1C LEVEL LT 7.0%: CPT | Mod: CPTII,,, | Performed by: INTERNAL MEDICINE

## 2024-01-08 PROCEDURE — 3008F BODY MASS INDEX DOCD: CPT | Mod: CPTII,,, | Performed by: INTERNAL MEDICINE

## 2024-01-08 PROCEDURE — 99396 PREV VISIT EST AGE 40-64: CPT | Mod: ,,, | Performed by: INTERNAL MEDICINE

## 2024-01-08 PROCEDURE — 3078F DIAST BP <80 MM HG: CPT | Mod: CPTII,,, | Performed by: INTERNAL MEDICINE

## 2024-01-08 PROCEDURE — 1159F MED LIST DOCD IN RCRD: CPT | Mod: CPTII,,, | Performed by: INTERNAL MEDICINE

## 2024-01-08 PROCEDURE — 1160F RVW MEDS BY RX/DR IN RCRD: CPT | Mod: CPTII,,, | Performed by: INTERNAL MEDICINE

## 2024-01-08 RX ORDER — ROSUVASTATIN CALCIUM 10 MG/1
10 TABLET, COATED ORAL DAILY
Qty: 90 TABLET | Refills: 3 | Status: SHIPPED | OUTPATIENT
Start: 2024-01-08 | End: 2025-01-07

## 2024-01-08 NOTE — ASSESSMENT & PLAN NOTE
-labs are reviewed all essentially normal except for elevated total cholesterol and LDL  -patient is advised on importance of watching her carbohydrate intake and saturated fat intake, making the right nutritional choices and exercising on a regular basis  -up-to-date with the screening

## 2024-01-08 NOTE — ASSESSMENT & PLAN NOTE
Initiating patient on a low-dose of Crestor  Stressed importance of dietary modifications. Follow a low cholesterol, low saturated fat diet with less that 200mg of cholesterol a day.  Avoid fried foods and high saturated fats (high saturated fats less than 7% of calories).  Add Flax Seed/Fish Oil supplements to diet. Increase dietary fiber.  Regular exercise can reduce LDL and raise HDL. Stressed importance of physical activity 5 times per week for 30 minutes per day.

## 2024-01-18 ENCOUNTER — PATIENT MESSAGE (OUTPATIENT)
Dept: INTERNAL MEDICINE | Facility: CLINIC | Age: 59
End: 2024-01-18
Payer: COMMERCIAL

## 2024-04-08 PROBLEM — Z00.00 WELLNESS EXAMINATION: Status: RESOLVED | Noted: 2023-07-06 | Resolved: 2024-04-08

## 2024-05-31 ENCOUNTER — LAB VISIT (OUTPATIENT)
Dept: LAB | Facility: HOSPITAL | Age: 59
End: 2024-05-31
Attending: INTERNAL MEDICINE
Payer: COMMERCIAL

## 2024-05-31 DIAGNOSIS — Z85.048 PERSONAL HISTORY OF RECTAL CANCER: ICD-10-CM

## 2024-05-31 LAB
ALBUMIN SERPL-MCNC: 3.5 G/DL (ref 3.4–4.8)
ALBUMIN/GLOB SERPL: 1 RATIO (ref 1.1–2)
ALP SERPL-CCNC: 121 UNIT/L (ref 40–150)
ALT SERPL-CCNC: 13 UNIT/L (ref 0–55)
ANION GAP SERPL CALC-SCNC: 9 MEQ/L
AST SERPL-CCNC: 19 UNIT/L (ref 5–34)
BASOPHILS # BLD AUTO: 0.08 X10(3)/MCL
BASOPHILS NFR BLD AUTO: 0.9 %
BILIRUB SERPL-MCNC: 0.6 MG/DL
BUN SERPL-MCNC: 12.5 MG/DL (ref 9.8–20.1)
CALCIUM SERPL-MCNC: 9.3 MG/DL (ref 8.4–10.2)
CEA SERPL-MCNC: 10.16 NG/ML (ref 0–3)
CHLORIDE SERPL-SCNC: 101 MMOL/L (ref 98–107)
CO2 SERPL-SCNC: 26 MMOL/L (ref 23–31)
CREAT SERPL-MCNC: 0.63 MG/DL (ref 0.55–1.02)
CREAT/UREA NIT SERPL: 20
EOSINOPHIL # BLD AUTO: 0.25 X10(3)/MCL (ref 0–0.9)
EOSINOPHIL NFR BLD AUTO: 2.9 %
ERYTHROCYTE [DISTWIDTH] IN BLOOD BY AUTOMATED COUNT: 13 % (ref 11.5–17)
GFR SERPLBLD CREATININE-BSD FMLA CKD-EPI: >60 ML/MIN/1.73/M2
GLOBULIN SER-MCNC: 3.5 GM/DL (ref 2.4–3.5)
GLUCOSE SERPL-MCNC: 85 MG/DL (ref 82–115)
HCT VFR BLD AUTO: 43 % (ref 37–47)
HGB BLD-MCNC: 13.9 G/DL (ref 12–16)
IMM GRANULOCYTES # BLD AUTO: 0.03 X10(3)/MCL (ref 0–0.04)
IMM GRANULOCYTES NFR BLD AUTO: 0.3 %
LYMPHOCYTES # BLD AUTO: 2.05 X10(3)/MCL (ref 0.6–4.6)
LYMPHOCYTES NFR BLD AUTO: 23.5 %
MCH RBC QN AUTO: 28.3 PG (ref 27–31)
MCHC RBC AUTO-ENTMCNC: 32.3 G/DL (ref 33–36)
MCV RBC AUTO: 87.6 FL (ref 80–94)
MONOCYTES # BLD AUTO: 0.93 X10(3)/MCL (ref 0.1–1.3)
MONOCYTES NFR BLD AUTO: 10.7 %
NEUTROPHILS # BLD AUTO: 5.38 X10(3)/MCL (ref 2.1–9.2)
NEUTROPHILS NFR BLD AUTO: 61.7 %
PLATELET # BLD AUTO: 450 X10(3)/MCL (ref 130–400)
PMV BLD AUTO: 8 FL (ref 7.4–10.4)
POTASSIUM SERPL-SCNC: 4.7 MMOL/L (ref 3.5–5.1)
PROT SERPL-MCNC: 7 GM/DL (ref 5.8–7.6)
RBC # BLD AUTO: 4.91 X10(6)/MCL (ref 4.2–5.4)
SODIUM SERPL-SCNC: 136 MMOL/L (ref 136–145)
WBC # SPEC AUTO: 8.72 X10(3)/MCL (ref 4.5–11.5)

## 2024-05-31 PROCEDURE — 36415 COLL VENOUS BLD VENIPUNCTURE: CPT

## 2024-05-31 PROCEDURE — 85025 COMPLETE CBC W/AUTO DIFF WBC: CPT

## 2024-05-31 PROCEDURE — 80053 COMPREHEN METABOLIC PANEL: CPT

## 2024-05-31 PROCEDURE — 82378 CARCINOEMBRYONIC ANTIGEN: CPT

## 2024-06-05 DIAGNOSIS — Z85.048 PERSONAL HISTORY OF RECTAL CANCER: ICD-10-CM

## 2024-06-05 DIAGNOSIS — R97.0 ELEVATED CEA: Primary | ICD-10-CM

## 2024-06-06 ENCOUNTER — LAB VISIT (OUTPATIENT)
Dept: LAB | Facility: HOSPITAL | Age: 59
End: 2024-06-06
Attending: NURSE PRACTITIONER
Payer: COMMERCIAL

## 2024-06-06 DIAGNOSIS — R97.0 ELEVATED CEA: ICD-10-CM

## 2024-06-06 DIAGNOSIS — Z85.048 PERSONAL HISTORY OF RECTAL CANCER: ICD-10-CM

## 2024-06-06 DIAGNOSIS — R97.0 ELEVATED CEA: Primary | ICD-10-CM

## 2024-06-06 LAB — CEA SERPL-MCNC: 9.84 NG/ML (ref 0–3)

## 2024-06-06 PROCEDURE — 36415 COLL VENOUS BLD VENIPUNCTURE: CPT

## 2024-06-06 PROCEDURE — 82378 CARCINOEMBRYONIC ANTIGEN: CPT

## 2024-06-10 ENCOUNTER — HOSPITAL ENCOUNTER (OUTPATIENT)
Dept: RADIOLOGY | Facility: HOSPITAL | Age: 59
Discharge: HOME | End: 2024-06-10
Attending: NURSE PRACTITIONER
Payer: COMMERCIAL

## 2024-06-10 ENCOUNTER — TELEPHONE (OUTPATIENT)
Dept: HEMATOLOGY/ONCOLOGY | Facility: CLINIC | Age: 59
End: 2024-06-10
Payer: COMMERCIAL

## 2024-06-10 DIAGNOSIS — Z85.048 PERSONAL HISTORY OF RECTAL CANCER: ICD-10-CM

## 2024-06-10 DIAGNOSIS — R91.8 MASS OF UPPER LOBE OF LEFT LUNG: Primary | ICD-10-CM

## 2024-06-10 DIAGNOSIS — R97.0 ELEVATED CEA: ICD-10-CM

## 2024-06-10 PROCEDURE — 74177 CT ABD & PELVIS W/CONTRAST: CPT | Mod: TC

## 2024-06-10 PROCEDURE — 25500020 PHARM REV CODE 255: Performed by: NURSE PRACTITIONER

## 2024-06-10 RX ADMIN — DIATRIZOATE MEGLUMINE AND DIATRIZOATE SODIUM 30 ML: 660; 100 LIQUID ORAL; RECTAL at 09:06

## 2024-06-10 RX ADMIN — IOPAMIDOL 100 ML: 755 INJECTION, SOLUTION INTRAVENOUS at 09:06

## 2024-06-11 ENCOUNTER — PATIENT MESSAGE (OUTPATIENT)
Dept: HEMATOLOGY/ONCOLOGY | Facility: CLINIC | Age: 59
End: 2024-06-11
Payer: COMMERCIAL

## 2024-06-12 ENCOUNTER — TELEPHONE (OUTPATIENT)
Dept: HEMATOLOGY/ONCOLOGY | Facility: CLINIC | Age: 59
End: 2024-06-12
Payer: COMMERCIAL

## 2024-06-17 ENCOUNTER — HOSPITAL ENCOUNTER (OUTPATIENT)
Dept: RADIOLOGY | Facility: HOSPITAL | Age: 59
Discharge: HOME | End: 2024-06-17
Attending: RADIOLOGY
Payer: COMMERCIAL

## 2024-06-17 ENCOUNTER — HOSPITAL ENCOUNTER (OUTPATIENT)
Dept: RADIOLOGY | Facility: HOSPITAL | Age: 59
Discharge: HOME | End: 2024-06-17
Attending: INTERNAL MEDICINE
Payer: COMMERCIAL

## 2024-06-17 VITALS
HEIGHT: 67 IN | BODY MASS INDEX: 28.55 KG/M2 | RESPIRATION RATE: 18 BRPM | OXYGEN SATURATION: 100 % | HEART RATE: 55 BPM | WEIGHT: 181.88 LBS | SYSTOLIC BLOOD PRESSURE: 112 MMHG | TEMPERATURE: 98 F | DIASTOLIC BLOOD PRESSURE: 70 MMHG

## 2024-06-17 DIAGNOSIS — R91.8 MASS OF UPPER LOBE OF LEFT LUNG: ICD-10-CM

## 2024-06-17 LAB
BASOPHILS # BLD AUTO: 0.11 X10(3)/MCL
BASOPHILS NFR BLD AUTO: 1.4 %
EOSINOPHIL # BLD AUTO: 0.33 X10(3)/MCL (ref 0–0.9)
EOSINOPHIL NFR BLD AUTO: 4.1 %
ERYTHROCYTE [DISTWIDTH] IN BLOOD BY AUTOMATED COUNT: 13.8 % (ref 11.5–17)
HCT VFR BLD AUTO: 44.9 % (ref 37–47)
HGB BLD-MCNC: 14.8 G/DL (ref 12–16)
IMM GRANULOCYTES # BLD AUTO: 0.03 X10(3)/MCL (ref 0–0.04)
IMM GRANULOCYTES NFR BLD AUTO: 0.4 %
INR PPP: 1.1
LYMPHOCYTES # BLD AUTO: 2.34 X10(3)/MCL (ref 0.6–4.6)
LYMPHOCYTES NFR BLD AUTO: 28.9 %
MCH RBC QN AUTO: 28.2 PG (ref 27–31)
MCHC RBC AUTO-ENTMCNC: 33 G/DL (ref 33–36)
MCV RBC AUTO: 85.5 FL (ref 80–94)
MONOCYTES # BLD AUTO: 0.84 X10(3)/MCL (ref 0.1–1.3)
MONOCYTES NFR BLD AUTO: 10.4 %
NEUTROPHILS # BLD AUTO: 4.45 X10(3)/MCL (ref 2.1–9.2)
NEUTROPHILS NFR BLD AUTO: 54.8 %
NRBC BLD AUTO-RTO: 0 %
PLATELET # BLD AUTO: 557 X10(3)/MCL (ref 130–400)
PMV BLD AUTO: 8.8 FL (ref 7.4–10.4)
PROTHROMBIN TIME: 13.6 SECONDS (ref 12.5–14.5)
RBC # BLD AUTO: 5.25 X10(6)/MCL (ref 4.2–5.4)
WBC # BLD AUTO: 8.1 X10(3)/MCL (ref 4.5–11.5)

## 2024-06-17 PROCEDURE — 71045 X-RAY EXAM CHEST 1 VIEW: CPT | Mod: TC

## 2024-06-17 PROCEDURE — 36415 COLL VENOUS BLD VENIPUNCTURE: CPT | Performed by: RADIOLOGY

## 2024-06-17 PROCEDURE — 85610 PROTHROMBIN TIME: CPT | Performed by: RADIOLOGY

## 2024-06-17 PROCEDURE — 25000003 PHARM REV CODE 250: Performed by: RADIOLOGY

## 2024-06-17 PROCEDURE — 63600175 PHARM REV CODE 636 W HCPCS: Performed by: RADIOLOGY

## 2024-06-17 PROCEDURE — 32408 CORE NDL BX LNG/MED PERQ: CPT

## 2024-06-17 PROCEDURE — 85025 COMPLETE CBC W/AUTO DIFF WBC: CPT | Performed by: RADIOLOGY

## 2024-06-17 RX ORDER — FENTANYL CITRATE 50 UG/ML
INJECTION, SOLUTION INTRAMUSCULAR; INTRAVENOUS
Status: COMPLETED | OUTPATIENT
Start: 2024-06-17 | End: 2024-06-17

## 2024-06-17 RX ORDER — MIDAZOLAM HYDROCHLORIDE 2 MG/2ML
INJECTION, SOLUTION INTRAMUSCULAR; INTRAVENOUS
Status: COMPLETED | OUTPATIENT
Start: 2024-06-17 | End: 2024-06-17

## 2024-06-17 RX ORDER — MIDAZOLAM HYDROCHLORIDE 2 MG/2ML
INJECTION, SOLUTION INTRAMUSCULAR; INTRAVENOUS
Status: DISCONTINUED
Start: 2024-06-17 | End: 2024-06-17 | Stop reason: HOSPADM

## 2024-06-17 RX ORDER — FENTANYL CITRATE 50 UG/ML
INJECTION, SOLUTION INTRAMUSCULAR; INTRAVENOUS
Status: DISCONTINUED
Start: 2024-06-17 | End: 2024-06-17 | Stop reason: HOSPADM

## 2024-06-17 RX ORDER — LIDOCAINE HYDROCHLORIDE 20 MG/ML
INJECTION, SOLUTION INFILTRATION; PERINEURAL
Status: COMPLETED | OUTPATIENT
Start: 2024-06-17 | End: 2024-06-17

## 2024-06-17 RX ADMIN — MIDAZOLAM HYDROCHLORIDE 1 MG: 1 INJECTION, SOLUTION INTRAMUSCULAR; INTRAVENOUS at 09:06

## 2024-06-17 RX ADMIN — FENTANYL CITRATE 50 MCG: 50 INJECTION, SOLUTION INTRAMUSCULAR; INTRAVENOUS at 09:06

## 2024-06-17 RX ADMIN — LIDOCAINE HYDROCHLORIDE 5 ML: 20 INJECTION, SOLUTION INFILTRATION; PERINEURAL at 09:06

## 2024-06-17 NOTE — PLAN OF CARE
VSS. Bed rest completed. Dr. Gonzalez spoke with patient regarding Xray results reasons to report to ER. Ok to discharge per Dr. Gonzalez. Discharge instructions given, patient and s/o verbalize understanding. IV removed and patient transported via wheelchair to family member's vehicle. Patient in no acute distress and bandaid in place clean, dry, and intact upon discharge.

## 2024-06-17 NOTE — DISCHARGE INSTRUCTIONS
Do not drive for 48 hours.   Do not lift anything heavier than gallon of milk for 5 days.   Do not submerge the incision in water for 5 days.   Report to ER:   Signs of infection. These include a fever of 100.4°F (38°C) or higher, chills, or wound that will not heal.  Signs of wound infection. These include swelling, redness, warmth around the biopsy site; too much pain when touched; yellowish, greenish, or bloody discharge; foul smell coming from the biopsy site; biopsy site opens up.  Shortness of breath, bad cough, or chest pain.  Coughing up blood  Pain with taking a deep breath  Dizziness or feel like you might pass out

## 2024-06-17 NOTE — H&P
Radiology History & Physical      SUBJECTIVE:     Chief Complaint: Left Lung Mass    History of Present Illness:  Alondra Snyder is a 59 y.o. female who presents for Biopsy  Past Medical History:   Diagnosis Date    Anxiety disorder, unspecified     Carpal tunnel syndrome     Malignant neoplasm of rectum     Rectal carcinoma      Past Surgical History:   Procedure Laterality Date    ADENOIDECTOMY  1968    BREAST SURGERY  2010    benign cyst removed    COLON RESECTION      COLON SURGERY  2015    COLONOSCOPY      removal of right ovary Right     SINUS SURGERY      TONSILLECTOMY  1968       Home Meds:   Prior to Admission medications    Medication Sig Start Date End Date Taking? Authorizing Provider   sertraline (ZOLOFT) 100 MG tablet Take 100 mg by mouth once daily. 3/29/23  Yes Provider, Historical   multivitamin capsule Take 1 capsule by mouth once daily.    Provider, Historical   rosuvastatin (CRESTOR) 10 MG tablet Take 1 tablet (10 mg total) by mouth once daily. 1/8/24 1/7/25  Jaimee Maher MD   tumeric-ging-olive-oreg-capryl 100 mg-150 mg- 50 mg-150 mg Cap Take by mouth.    Provider, Historical   vitamin D (VITAMIN D3) 1000 units Tab Take 1,000 Units by mouth once daily.    Provider, Historical     Anticoagulants/Antiplatelets: no anticoagulation    Allergies: Review of patient's allergies indicates:  No Known Allergies  Sedation History:  no adverse reactions    Review of Systems:   Hematological: no known coagulopathies  Respiratory: no shortness of breath  Cardiovascular: no chest pain  Gastrointestinal: no abdominal pain  Genito-Urinary: no dysuria  Musculoskeletal: negative  Neurological: no TIA or stroke symptoms         OBJECTIVE:     Vital Signs (Most Recent)  Temp: 98.1 °F (36.7 °C) (06/17/24 0727)  Pulse: 77 (06/17/24 0727)  BP: 110/69 (06/17/24 0727)  SpO2: 97 % (06/17/24 0727)    Physical Exam:  ASA: 2    General: no acute distress  Mental Status: alert and oriented to person, place and  time  HEENT: normocephalic, atraumatic  Chest: unlabored breathing  Heart: regular heart rate  Abdomen: nondistended  Extremity: moves all extremities    Laboratory  Lab Results   Component Value Date    INR 1.1 06/17/2024       Lab Results   Component Value Date    WBC 8.10 06/17/2024    HGB 14.8 06/17/2024    HCT 44.9 06/17/2024    MCV 85.5 06/17/2024     (H) 06/17/2024      Lab Results   Component Value Date     05/31/2024    K 4.7 05/31/2024     05/31/2024    CO2 26 05/31/2024    BUN 12.5 05/31/2024    CREATININE 0.63 05/31/2024    CALCIUM 9.3 05/31/2024    ALT 13 05/31/2024    AST 19 05/31/2024    ALBUMIN 3.5 05/31/2024    BILITOT 0.6 05/31/2024    BILIDIR 0.10 03/12/2020       ASSESSMENT/PLAN:     Sedation Plan: moderate  Patient will undergo left lung Biopsy.    Miki Gonzalez MD

## 2024-06-19 LAB — PSYCHE PATHOLOGY RESULT: NORMAL

## 2024-06-20 ENCOUNTER — TELEPHONE (OUTPATIENT)
Dept: HEMATOLOGY/ONCOLOGY | Facility: CLINIC | Age: 59
End: 2024-06-20
Payer: COMMERCIAL

## 2024-06-20 NOTE — TELEPHONE ENCOUNTER
----- Message from Lemuel Dave LCSW sent at 6/20/2024 12:23 PM CDT -----  Regarding: patient requests records sent to [facility] in [place]  Patient is on her way to [place] for vacation and wants to be seen at [facility]. She asked that records be sent to fax# [phone].  Thanks,  Lemuel

## 2024-07-03 ENCOUNTER — TELEPHONE (OUTPATIENT)
Dept: HEMATOLOGY/ONCOLOGY | Facility: CLINIC | Age: 59
End: 2024-07-03
Payer: COMMERCIAL

## 2024-07-03 PROBLEM — C78.02 SECONDARY MALIGNANCY OF LEFT LUNG: Status: ACTIVE | Noted: 2024-07-03

## 2024-07-03 NOTE — TELEPHONE ENCOUNTER
LM on patient voicemail asking if she plans to keep her appt with Dr. Hernandez on 7/10/2024 to discuss results and treatment recommendations since we have not heard from her and have not received anything from [facility].

## 2024-07-16 DIAGNOSIS — R91.8 MASS OF UPPER LOBE OF LEFT LUNG: Primary | ICD-10-CM

## 2024-07-17 ENCOUNTER — PATIENT MESSAGE (OUTPATIENT)
Dept: HEMATOLOGY/ONCOLOGY | Facility: CLINIC | Age: 59
End: 2024-07-17
Payer: COMMERCIAL

## 2024-07-23 NOTE — PROGRESS NOTES
Subjective:       Patient ID: Alondra Snyder is a 59 y.o. female.    Chief Complaint:  I am anxious to get treatment started    Diagnosis: Recurrent metastatic rectal cancer                    Stage IIIA rectal carcinoma 3/15 (T2 N1b M0); 3/28+ nodes     Treatment History  Oxaliplatin/Xeloda x6 completed 8/15  --> Xeloda/XRT completed 11/11/15    Current Treatment: Treatment planning     Clinical History: Patient was referred for a first time screening colonoscopy at the age of 49 by her gynecologist.  She had no abdominal symptoms or complaints, changes in her bowel habits, melena or hematochezia.  Colonoscopy revealed a malignant appearing polyp at the rectosigmoid junction.  Biopsy was positive for adenocarcinoma.  Preoperative CEA level was normal 2 ng/mL.  Chest x-ray was unremarkable.  CT A/P showed a large solid left adnexal mass measuring 4.9 x 6.1 cm.  There was no specific abnormality in the sigmoid colon or rectum and no evidence of metastatic disease.  Pelvic ultrasound confirmed that the lesion was a uterine fibroid.  She underwent a laparoscopic resection with primary reanastomosis 3/18/15.  Final pathology showed a 2 cm moderately differentiated adenocarcinoma invading into but not through the muscularis propria.  3 out of 28 lymph nodes were positive for metastatic involvement. All resection margins were clear.  At the time of surgery, the lesion was delineated to be in the upper rectum. She recovered from her surgery uneventfully.  She completed adjuvant chemotherapy and radiation as documented above. Her Mediport catheter was removed 10/17.    She was lost to follow-up from 9/19 until 6/21/23 due to the COVID-19 pandemic.  She reported no significant problems or health issues in the interim.  She had a screening colonoscopy 1/2/20 that showed a single polyp in the ascending colon which was removed with pathology showing benign polypoid colonic mucosa with no evidence of adenoma.  Follow-up exam  was recommended in 5 years.    Laboratory testing 5/31/24 prior to her annual surveillance visit showed an elevated CEA level of 10.16 ng/mL.  Testing was repeated on 6/6/24 with a level of 9.84 ng/mL.  CT C/A/P 6/10/24 showed a 7.8 cm lobulated soft tissue mass in the left upper lobe extending from the left hilum to the pleural margin.  There was a 1.5 cm right hilar node and small AP window nodes.  There was a stable nodular soft tissue density adjacent to the left uterine margin unchanged from 2019.  CT-guided biopsy 6/17/24 revealed a metastatic adenocarcinoma consistent with colorectal primary.  She had left on a trip to [place] when the results came back.  She arranged to be seen at [facility] while she was in [place].    Workup at [facility]  CT-PET scan 7/12/24:  Large DIANA hypermetabolic mass measuring up to 8.1 cm with central necrosis.  Multiple hypermetabolic hepatic metastases.  MRI abdomen 7/12/24:  At least 7 bipolar hepatic metastases, largest 3.0 cm segment MIGUEL.  MRI brain 7/12/24:  No metastatic disease.    Molecular testing:  HER2 negative.  Pathology QNS for additional molecular studies.    Interval History  She returns to the office today to discuss treatment for her recurrent, metastatic colorectal cancer.  She was evaluated by Medical Oncology at [facility] in [place].  Workup is summarized above.  She underwent placement of a MediPort catheter in anticipation of systemic therapy.  She denies any chest pain or significant shortness of breath.  She has an intermittent cough without significant sputum production, no hemoptysis.  No weight loss.  No abdominal symptoms or complaints.      Review of Systems   Constitutional:  Negative for appetite change, fatigue, fever and unexpected weight change.   HENT:  Negative for mouth sores, sore throat and trouble swallowing.    Eyes: Negative.    Respiratory:  Positive for cough. Negative for shortness of breath and wheezing.   "  Cardiovascular:  Negative for chest pain, palpitations and leg swelling.   Gastrointestinal:  Negative for abdominal distention, abdominal pain, constipation, diarrhea, nausea and vomiting.   Genitourinary:  Negative for dysuria, frequency and urgency.   Musculoskeletal:  Negative for arthralgias and back pain.   Integumentary:  Negative for pallor and rash.   Neurological:  Negative for dizziness, weakness, numbness and headaches.   Hematological:  Negative for adenopathy. Does not bruise/bleed easily.   Psychiatric/Behavioral:  Negative for confusion and dysphoric mood. The patient is nervous/anxious.        PMHx:  Endometriosis, anxiety/depression  PSHx:  Tonsils, sinus surgery, right oophorectomy, partial colectomy  SH:  Lifetime nonsmoker, occasional alcohol use.  Lives in [place] with her significant other.  Works as a .  FH:  Mother had kidney cancer.  A maternal aunt and 1st cousin had breast cancer.    Objective:        /88   Pulse 83   Temp 98 °F (36.7 °C)   Resp 15   Ht 5' 7" (1.702 m)   Wt 80 kg (176 lb 6.4 oz)   SpO2 96%   BMI 27.63 kg/m²    Physical Exam  Constitutional:       Comments: Well-developed white female in NAD   HENT:      Head: Normocephalic.      Mouth/Throat:      Mouth: Mucous membranes are moist.      Pharynx: Oropharynx is clear. No posterior oropharyngeal erythema.   Eyes:      General: No scleral icterus.     Extraocular Movements: Extraocular movements intact.      Pupils: Pupils are equal, round, and reactive to light.   Cardiovascular:      Rate and Rhythm: Normal rate and regular rhythm.      Heart sounds: No murmur heard.     Comments: MediPort catheter right chest wall and MediPort removal incision left chest wall.  Pulmonary:      Comments: Lungs clear to auscultation  Abdominal:      General: Bowel sounds are normal. There is no distension.      Palpations: Abdomen is soft.      Tenderness: There is no abdominal tenderness.      " Comments: Well-healed midline incision below umbilicus and laparoscopic sites. No palpable masses or organomegaly.   Musculoskeletal:         General: No swelling or tenderness. Normal range of motion.      Cervical back: Neck supple. No tenderness.   Skin:     General: Skin is warm and dry.      Findings: No rash.   Neurological:      General: No focal deficit present.      Mental Status: She is alert and oriented to person, place, and time.      Cranial Nerves: No cranial nerve deficit.      Motor: No weakness.       ECOG SCORE    0 - Fully active-able to carry on all pre-disease performance without restriction          LABORATORY  No results found for this or any previous visit (from the past 168 hour(s)).       Outside laboratory 7/3/24:  WBC 7.8, hemoglobin 14.1, hematocrit 42.9, platelets 524.  CMP showed normal electrolytes and glucose, BUN 13, creatinine 0.53, calcium 9.7, alkaline phosphatase 125, normal transaminases.  CEA 13.1 ng/mL, CA 19-9 normal.    Assessment:   Recurrent metastatic rectal cancer      Plan:   All of her outside imaging, laboratory and consultation notes from [facility] were reviewed and discussed in detail over the course of a 1 hour office visit.  She has biopsy proven metastatic disease involving the left lung and bilobar hepatic metastases.  Molecular testing was HER2 negative.  Additional testing could not be performed due to a limited sample from the CT-guided lung biopsy.    A peripheral blood molecular panel will be submitted to [facility] today.    Treatment was recommended with a regimen of FOLFIRI + Bevacizumab every 2 weeks with restaging after 4-5 cycles of therapy.  Benefits, risks, toxicities and side effects of this regimen were discussed and reviewed in detail. All questions were answered. Literature regarding the treatment plan and specific drug information was also provided.  She would like to start treatment as soon as possible.  She will be scheduled for cycle 1 on  7/31/24 pending insurance approval.  RTC in 2 weeks for a follow-up visit prior to her 2nd cycle of therapy.      TIM HARDY MD    Other Physicians  Dr. Jiamee Sanches

## 2024-07-29 ENCOUNTER — OFFICE VISIT (OUTPATIENT)
Dept: HEMATOLOGY/ONCOLOGY | Facility: CLINIC | Age: 59
End: 2024-07-29
Payer: COMMERCIAL

## 2024-07-29 VITALS
HEART RATE: 83 BPM | OXYGEN SATURATION: 96 % | TEMPERATURE: 98 F | DIASTOLIC BLOOD PRESSURE: 88 MMHG | HEIGHT: 67 IN | WEIGHT: 176.38 LBS | BODY MASS INDEX: 27.68 KG/M2 | RESPIRATION RATE: 15 BRPM | SYSTOLIC BLOOD PRESSURE: 136 MMHG

## 2024-07-29 DIAGNOSIS — R91.8 MASS OF UPPER LOBE OF LEFT LUNG: Primary | ICD-10-CM

## 2024-07-29 DIAGNOSIS — C78.7 SECONDARY MALIGNANT NEOPLASM OF LIVER: ICD-10-CM

## 2024-07-29 DIAGNOSIS — C20 RECTAL CANCER: Primary | ICD-10-CM

## 2024-07-29 DIAGNOSIS — C78.01 MALIGNANT NEOPLASM METASTATIC TO RIGHT LUNG: ICD-10-CM

## 2024-07-29 PROCEDURE — 36415 COLL VENOUS BLD VENIPUNCTURE: CPT | Performed by: INTERNAL MEDICINE

## 2024-07-29 PROCEDURE — 3008F BODY MASS INDEX DOCD: CPT | Mod: CPTII,S$GLB,, | Performed by: INTERNAL MEDICINE

## 2024-07-29 PROCEDURE — 1160F RVW MEDS BY RX/DR IN RCRD: CPT | Mod: CPTII,S$GLB,, | Performed by: INTERNAL MEDICINE

## 2024-07-29 PROCEDURE — 99999 PR PBB SHADOW E&M-EST. PATIENT-LVL IV: CPT | Mod: PBBFAC,,, | Performed by: INTERNAL MEDICINE

## 2024-07-29 PROCEDURE — 1159F MED LIST DOCD IN RCRD: CPT | Mod: CPTII,S$GLB,, | Performed by: INTERNAL MEDICINE

## 2024-07-29 PROCEDURE — 3044F HG A1C LEVEL LT 7.0%: CPT | Mod: CPTII,S$GLB,, | Performed by: INTERNAL MEDICINE

## 2024-07-29 PROCEDURE — 3079F DIAST BP 80-89 MM HG: CPT | Mod: CPTII,S$GLB,, | Performed by: INTERNAL MEDICINE

## 2024-07-29 PROCEDURE — 99215 OFFICE O/P EST HI 40 MIN: CPT | Mod: S$GLB,,, | Performed by: INTERNAL MEDICINE

## 2024-07-29 PROCEDURE — 3075F SYST BP GE 130 - 139MM HG: CPT | Mod: CPTII,S$GLB,, | Performed by: INTERNAL MEDICINE

## 2024-07-29 RX ORDER — ONDANSETRON HYDROCHLORIDE 8 MG/1
8 TABLET, FILM COATED ORAL EVERY 6 HOURS PRN
Qty: 20 TABLET | Refills: 5 | Status: SHIPPED | OUTPATIENT
Start: 2024-07-29

## 2024-07-30 DIAGNOSIS — C78.01 MALIGNANT NEOPLASM METASTATIC TO RIGHT LUNG: ICD-10-CM

## 2024-07-30 DIAGNOSIS — C78.02 SECONDARY MALIGNANCY OF LEFT LUNG: Primary | ICD-10-CM

## 2024-07-30 RX ORDER — PROCHLORPERAZINE EDISYLATE 5 MG/ML
10 INJECTION INTRAMUSCULAR; INTRAVENOUS ONCE AS NEEDED
Status: CANCELLED | OUTPATIENT
Start: 2024-07-31

## 2024-07-30 RX ORDER — PROCHLORPERAZINE EDISYLATE 5 MG/ML
10 INJECTION INTRAMUSCULAR; INTRAVENOUS ONCE AS NEEDED
Status: CANCELLED | OUTPATIENT
Start: 2024-08-02

## 2024-07-30 RX ORDER — SODIUM CHLORIDE 0.9 % (FLUSH) 0.9 %
10 SYRINGE (ML) INJECTION
Status: CANCELLED | OUTPATIENT
Start: 2024-08-02

## 2024-07-30 RX ORDER — ATROPINE SULFATE 0.4 MG/ML
0.4 INJECTION, SOLUTION ENDOTRACHEAL; INTRAMEDULLARY; INTRAMUSCULAR; INTRAVENOUS; SUBCUTANEOUS
Status: CANCELLED
Start: 2024-07-31

## 2024-07-30 RX ORDER — FLUOROURACIL 50 MG/ML
400 INJECTION, SOLUTION INTRAVENOUS
Status: CANCELLED | OUTPATIENT
Start: 2024-07-31

## 2024-07-30 RX ORDER — HEPARIN 100 UNIT/ML
500 SYRINGE INTRAVENOUS
Status: CANCELLED | OUTPATIENT
Start: 2024-08-02

## 2024-07-30 RX ORDER — FLUOROURACIL 50 MG/ML
2400 INJECTION, SOLUTION INTRAVENOUS
Status: CANCELLED | OUTPATIENT
Start: 2024-07-31

## 2024-07-30 RX ORDER — SODIUM CHLORIDE 0.9 % (FLUSH) 0.9 %
10 SYRINGE (ML) INJECTION
Status: CANCELLED | OUTPATIENT
Start: 2024-07-31

## 2024-07-30 RX ORDER — EPINEPHRINE 0.3 MG/.3ML
0.3 INJECTION SUBCUTANEOUS ONCE AS NEEDED
Status: CANCELLED | OUTPATIENT
Start: 2024-07-31

## 2024-07-30 RX ORDER — HEPARIN 100 UNIT/ML
500 SYRINGE INTRAVENOUS
Status: CANCELLED | OUTPATIENT
Start: 2024-07-31

## 2024-07-30 RX ORDER — DIPHENHYDRAMINE HYDROCHLORIDE 50 MG/ML
50 INJECTION INTRAMUSCULAR; INTRAVENOUS ONCE AS NEEDED
Status: CANCELLED | OUTPATIENT
Start: 2024-07-31

## 2024-07-31 ENCOUNTER — INFUSION (OUTPATIENT)
Dept: INFUSION THERAPY | Facility: HOSPITAL | Age: 59
End: 2024-07-31
Attending: INTERNAL MEDICINE
Payer: COMMERCIAL

## 2024-07-31 VITALS
BODY MASS INDEX: 27.86 KG/M2 | TEMPERATURE: 98 F | WEIGHT: 177.5 LBS | HEIGHT: 67 IN | DIASTOLIC BLOOD PRESSURE: 75 MMHG | HEART RATE: 60 BPM | RESPIRATION RATE: 18 BRPM | OXYGEN SATURATION: 96 % | SYSTOLIC BLOOD PRESSURE: 111 MMHG

## 2024-07-31 DIAGNOSIS — C78.02 SECONDARY MALIGNANCY OF LEFT LUNG: ICD-10-CM

## 2024-07-31 DIAGNOSIS — C78.01 MALIGNANT NEOPLASM METASTATIC TO RIGHT LUNG: Primary | ICD-10-CM

## 2024-07-31 PROCEDURE — 96413 CHEMO IV INFUSION 1 HR: CPT

## 2024-07-31 PROCEDURE — 96368 THER/DIAG CONCURRENT INF: CPT

## 2024-07-31 PROCEDURE — 63600175 PHARM REV CODE 636 W HCPCS: Mod: JZ,JG | Performed by: INTERNAL MEDICINE

## 2024-07-31 PROCEDURE — 25000003 PHARM REV CODE 250: Performed by: INTERNAL MEDICINE

## 2024-07-31 PROCEDURE — 96417 CHEMO IV INFUS EACH ADDL SEQ: CPT

## 2024-07-31 PROCEDURE — 96367 TX/PROPH/DG ADDL SEQ IV INF: CPT

## 2024-07-31 PROCEDURE — 96411 CHEMO IV PUSH ADDL DRUG: CPT

## 2024-07-31 PROCEDURE — 96375 TX/PRO/DX INJ NEW DRUG ADDON: CPT

## 2024-07-31 PROCEDURE — 96416 CHEMO PROLONG INFUSE W/PUMP: CPT

## 2024-07-31 RX ORDER — HEPARIN 100 UNIT/ML
500 SYRINGE INTRAVENOUS
Status: DISCONTINUED | OUTPATIENT
Start: 2024-07-31 | End: 2024-07-31 | Stop reason: HOSPADM

## 2024-07-31 RX ORDER — FLUOROURACIL 50 MG/ML
400 INJECTION, SOLUTION INTRAVENOUS
Status: COMPLETED | OUTPATIENT
Start: 2024-07-31 | End: 2024-07-31

## 2024-07-31 RX ORDER — DIPHENHYDRAMINE HYDROCHLORIDE 50 MG/ML
50 INJECTION INTRAMUSCULAR; INTRAVENOUS ONCE AS NEEDED
Status: DISCONTINUED | OUTPATIENT
Start: 2024-07-31 | End: 2024-07-31 | Stop reason: HOSPADM

## 2024-07-31 RX ORDER — ATROPINE SULFATE 0.4 MG/ML
0.4 INJECTION, SOLUTION ENDOTRACHEAL; INTRAMEDULLARY; INTRAMUSCULAR; INTRAVENOUS; SUBCUTANEOUS
Status: COMPLETED | OUTPATIENT
Start: 2024-07-31 | End: 2024-07-31

## 2024-07-31 RX ORDER — PROCHLORPERAZINE EDISYLATE 5 MG/ML
10 INJECTION INTRAMUSCULAR; INTRAVENOUS ONCE AS NEEDED
Status: DISCONTINUED | OUTPATIENT
Start: 2024-07-31 | End: 2024-07-31 | Stop reason: HOSPADM

## 2024-07-31 RX ORDER — SODIUM CHLORIDE 0.9 % (FLUSH) 0.9 %
10 SYRINGE (ML) INJECTION
Status: DISCONTINUED | OUTPATIENT
Start: 2024-07-31 | End: 2024-07-31 | Stop reason: HOSPADM

## 2024-07-31 RX ORDER — EPINEPHRINE 0.3 MG/.3ML
0.3 INJECTION SUBCUTANEOUS ONCE AS NEEDED
Status: DISCONTINUED | OUTPATIENT
Start: 2024-07-31 | End: 2024-07-31 | Stop reason: HOSPADM

## 2024-07-31 RX ADMIN — IRINOTECAN HYDROCHLORIDE 350 MG: 20 INJECTION, SOLUTION INTRAVENOUS at 10:07

## 2024-07-31 RX ADMIN — FLUOROURACIL 775 MG: 50 INJECTION, SOLUTION INTRAVENOUS at 11:07

## 2024-07-31 RX ADMIN — FLUOROURACIL 4650 MG: 50 INJECTION, SOLUTION INTRAVENOUS at 12:07

## 2024-07-31 RX ADMIN — SODIUM CHLORIDE: 9 INJECTION, SOLUTION INTRAVENOUS at 08:07

## 2024-07-31 RX ADMIN — DEXAMETHASONE SODIUM PHOSPHATE 0.25 MG: 4 INJECTION, SOLUTION INTRA-ARTICULAR; INTRALESIONAL; INTRAMUSCULAR; INTRAVENOUS; SOFT TISSUE at 09:07

## 2024-07-31 RX ADMIN — ATROPINE SULFATE 0.4 MG: 0.4 INJECTION, SOLUTION INTRAVENOUS at 09:07

## 2024-07-31 RX ADMIN — BEVACIZUMAB-AWWB 400 MG: 400 INJECTION, SOLUTION INTRAVENOUS at 09:07

## 2024-07-31 RX ADMIN — LEUCOVORIN CALCIUM 750 MG: 50 INJECTION, POWDER, LYOPHILIZED, FOR SOLUTION INTRAMUSCULAR; INTRAVENOUS at 10:07

## 2024-07-31 NOTE — NURSING
Patient received C1D1, tolerated well. Left facility with CADD pump, will return on Friday around 1020. Instructed to call facility with any issues or concerns. Voiced understanding.

## 2024-08-02 ENCOUNTER — INFUSION (OUTPATIENT)
Dept: INFUSION THERAPY | Facility: HOSPITAL | Age: 59
End: 2024-08-02
Attending: INTERNAL MEDICINE
Payer: COMMERCIAL

## 2024-08-02 VITALS
SYSTOLIC BLOOD PRESSURE: 95 MMHG | OXYGEN SATURATION: 96 % | HEART RATE: 68 BPM | RESPIRATION RATE: 18 BRPM | TEMPERATURE: 98 F | DIASTOLIC BLOOD PRESSURE: 65 MMHG

## 2024-08-02 DIAGNOSIS — C78.02 SECONDARY MALIGNANCY OF LEFT LUNG: ICD-10-CM

## 2024-08-02 DIAGNOSIS — C78.01 MALIGNANT NEOPLASM METASTATIC TO RIGHT LUNG: Primary | ICD-10-CM

## 2024-08-02 PROCEDURE — 63600175 PHARM REV CODE 636 W HCPCS: Performed by: INTERNAL MEDICINE

## 2024-08-02 PROCEDURE — 25000003 PHARM REV CODE 250: Performed by: INTERNAL MEDICINE

## 2024-08-02 PROCEDURE — A4216 STERILE WATER/SALINE, 10 ML: HCPCS | Performed by: INTERNAL MEDICINE

## 2024-08-02 RX ORDER — PROCHLORPERAZINE EDISYLATE 5 MG/ML
10 INJECTION INTRAMUSCULAR; INTRAVENOUS ONCE AS NEEDED
Status: DISCONTINUED | OUTPATIENT
Start: 2024-08-02 | End: 2024-08-02 | Stop reason: HOSPADM

## 2024-08-02 RX ORDER — SODIUM CHLORIDE 0.9 % (FLUSH) 0.9 %
10 SYRINGE (ML) INJECTION
Status: DISCONTINUED | OUTPATIENT
Start: 2024-08-02 | End: 2024-08-02 | Stop reason: HOSPADM

## 2024-08-02 RX ORDER — HEPARIN 100 UNIT/ML
500 SYRINGE INTRAVENOUS
Status: DISCONTINUED | OUTPATIENT
Start: 2024-08-02 | End: 2024-08-02 | Stop reason: HOSPADM

## 2024-08-02 RX ADMIN — HEPARIN 500 UNITS: 100 SYRINGE at 10:08

## 2024-08-02 RX ADMIN — Medication 10 ML: at 10:08

## 2024-08-05 ENCOUNTER — OFFICE VISIT (OUTPATIENT)
Dept: HEMATOLOGY/ONCOLOGY | Facility: CLINIC | Age: 59
End: 2024-08-05
Payer: COMMERCIAL

## 2024-08-05 VITALS
TEMPERATURE: 98 F | BODY MASS INDEX: 27.31 KG/M2 | SYSTOLIC BLOOD PRESSURE: 100 MMHG | DIASTOLIC BLOOD PRESSURE: 66 MMHG | WEIGHT: 174 LBS | OXYGEN SATURATION: 98 % | HEART RATE: 82 BPM | HEIGHT: 67 IN

## 2024-08-05 DIAGNOSIS — C20 RECTAL CANCER: ICD-10-CM

## 2024-08-05 DIAGNOSIS — C78.01 MALIGNANT NEOPLASM METASTATIC TO RIGHT LUNG: ICD-10-CM

## 2024-08-05 DIAGNOSIS — C78.01 MALIGNANT NEOPLASM METASTATIC TO RIGHT LUNG: Primary | ICD-10-CM

## 2024-08-05 PROCEDURE — 99499 UNLISTED E&M SERVICE: CPT | Mod: S$GLB,,, | Performed by: SOCIAL WORKER

## 2024-08-05 PROCEDURE — 3078F DIAST BP <80 MM HG: CPT | Mod: CPTII,S$GLB,,

## 2024-08-05 PROCEDURE — 3044F HG A1C LEVEL LT 7.0%: CPT | Mod: CPTII,S$GLB,,

## 2024-08-05 PROCEDURE — 99999 PR PBB SHADOW E&M-EST. PATIENT-LVL II: CPT | Mod: PBBFAC,,, | Performed by: SOCIAL WORKER

## 2024-08-05 PROCEDURE — 99999 PR PBB SHADOW E&M-EST. PATIENT-LVL III: CPT | Mod: PBBFAC,,,

## 2024-08-05 PROCEDURE — 3074F SYST BP LT 130 MM HG: CPT | Mod: CPTII,S$GLB,,

## 2024-08-05 PROCEDURE — 99215 OFFICE O/P EST HI 40 MIN: CPT | Mod: S$GLB,,,

## 2024-08-05 PROCEDURE — 1159F MED LIST DOCD IN RCRD: CPT | Mod: CPTII,S$GLB,,

## 2024-08-05 PROCEDURE — 3008F BODY MASS INDEX DOCD: CPT | Mod: CPTII,S$GLB,,

## 2024-08-06 DIAGNOSIS — C78.01 MALIGNANT NEOPLASM METASTATIC TO RIGHT LUNG: Primary | ICD-10-CM

## 2024-08-06 RX ORDER — LIDOCAINE AND PRILOCAINE 25; 25 MG/G; MG/G
CREAM TOPICAL
Qty: 30 G | Refills: 0 | Status: SHIPPED | OUTPATIENT
Start: 2024-08-06

## 2024-08-07 ENCOUNTER — PATIENT MESSAGE (OUTPATIENT)
Dept: HEMATOLOGY/ONCOLOGY | Facility: CLINIC | Age: 59
End: 2024-08-07
Payer: COMMERCIAL

## 2024-08-12 ENCOUNTER — OFFICE VISIT (OUTPATIENT)
Dept: HEMATOLOGY/ONCOLOGY | Facility: CLINIC | Age: 59
End: 2024-08-12
Payer: COMMERCIAL

## 2024-08-12 ENCOUNTER — LAB VISIT (OUTPATIENT)
Dept: LAB | Facility: HOSPITAL | Age: 59
End: 2024-08-12
Attending: INTERNAL MEDICINE
Payer: COMMERCIAL

## 2024-08-12 VITALS
DIASTOLIC BLOOD PRESSURE: 79 MMHG | HEART RATE: 62 BPM | OXYGEN SATURATION: 99 % | WEIGHT: 174.38 LBS | RESPIRATION RATE: 15 BRPM | TEMPERATURE: 98 F | SYSTOLIC BLOOD PRESSURE: 122 MMHG | HEIGHT: 67 IN | BODY MASS INDEX: 27.37 KG/M2

## 2024-08-12 DIAGNOSIS — C20 RECTAL CANCER: ICD-10-CM

## 2024-08-12 DIAGNOSIS — Z13.89 SCREENING FOR HEMATURIA OR PROTEINURIA: ICD-10-CM

## 2024-08-12 DIAGNOSIS — C78.01 MALIGNANT NEOPLASM METASTATIC TO RIGHT LUNG: ICD-10-CM

## 2024-08-12 DIAGNOSIS — C20 RECTAL CANCER: Primary | ICD-10-CM

## 2024-08-12 DIAGNOSIS — C78.7 SECONDARY MALIGNANT NEOPLASM OF LIVER: ICD-10-CM

## 2024-08-12 LAB
ALBUMIN SERPL-MCNC: 3 G/DL (ref 3.4–4.8)
ALBUMIN/GLOB SERPL: 0.8 RATIO (ref 1.1–2)
ALP SERPL-CCNC: 186 UNIT/L (ref 40–150)
ALT SERPL-CCNC: 10 UNIT/L (ref 0–55)
ANION GAP SERPL CALC-SCNC: 9 MEQ/L
AST SERPL-CCNC: 16 UNIT/L (ref 5–34)
BASOPHILS # BLD AUTO: 0.02 X10(3)/MCL
BASOPHILS NFR BLD AUTO: 0.4 %
BILIRUB SERPL-MCNC: 0.3 MG/DL
BUN SERPL-MCNC: 6.4 MG/DL (ref 9.8–20.1)
CALCIUM SERPL-MCNC: 9.3 MG/DL (ref 8.4–10.2)
CHLORIDE SERPL-SCNC: 104 MMOL/L (ref 98–107)
CO2 SERPL-SCNC: 26 MMOL/L (ref 23–31)
CREAT SERPL-MCNC: 0.6 MG/DL (ref 0.55–1.02)
CREAT/UREA NIT SERPL: 11
EOSINOPHIL # BLD AUTO: 0.32 X10(3)/MCL (ref 0–0.9)
EOSINOPHIL NFR BLD AUTO: 6.3 %
ERYTHROCYTE [DISTWIDTH] IN BLOOD BY AUTOMATED COUNT: 12.8 % (ref 11.5–17)
GFR SERPLBLD CREATININE-BSD FMLA CKD-EPI: >60 ML/MIN/1.73/M2
GLOBULIN SER-MCNC: 3.6 GM/DL (ref 2.4–3.5)
GLUCOSE SERPL-MCNC: 100 MG/DL (ref 82–115)
HCT VFR BLD AUTO: 38 % (ref 37–47)
HGB BLD-MCNC: 12.4 G/DL (ref 12–16)
IMM GRANULOCYTES # BLD AUTO: 0.01 X10(3)/MCL (ref 0–0.04)
IMM GRANULOCYTES NFR BLD AUTO: 0.2 %
LYMPHOCYTES # BLD AUTO: 2.06 X10(3)/MCL (ref 0.6–4.6)
LYMPHOCYTES NFR BLD AUTO: 40.8 %
MCH RBC QN AUTO: 28.2 PG (ref 27–31)
MCHC RBC AUTO-ENTMCNC: 32.6 G/DL (ref 33–36)
MCV RBC AUTO: 86.4 FL (ref 80–94)
MONOCYTES # BLD AUTO: 0.45 X10(3)/MCL (ref 0.1–1.3)
MONOCYTES NFR BLD AUTO: 8.9 %
NEUTROPHILS # BLD AUTO: 2.19 X10(3)/MCL (ref 2.1–9.2)
NEUTROPHILS NFR BLD AUTO: 43.4 %
PLATELET # BLD AUTO: 545 X10(3)/MCL (ref 130–400)
PMV BLD AUTO: 8 FL (ref 7.4–10.4)
POTASSIUM SERPL-SCNC: 4.5 MMOL/L (ref 3.5–5.1)
PROT SERPL-MCNC: 6.6 GM/DL (ref 5.8–7.6)
RBC # BLD AUTO: 4.4 X10(6)/MCL (ref 4.2–5.4)
SODIUM SERPL-SCNC: 139 MMOL/L (ref 136–145)
WBC # BLD AUTO: 5.05 X10(3)/MCL (ref 4.5–11.5)

## 2024-08-12 PROCEDURE — 1160F RVW MEDS BY RX/DR IN RCRD: CPT | Mod: CPTII,S$GLB,, | Performed by: NURSE PRACTITIONER

## 2024-08-12 PROCEDURE — 85025 COMPLETE CBC W/AUTO DIFF WBC: CPT

## 2024-08-12 PROCEDURE — 3044F HG A1C LEVEL LT 7.0%: CPT | Mod: CPTII,S$GLB,, | Performed by: NURSE PRACTITIONER

## 2024-08-12 PROCEDURE — 80053 COMPREHEN METABOLIC PANEL: CPT

## 2024-08-12 PROCEDURE — 1159F MED LIST DOCD IN RCRD: CPT | Mod: CPTII,S$GLB,, | Performed by: NURSE PRACTITIONER

## 2024-08-12 PROCEDURE — 3008F BODY MASS INDEX DOCD: CPT | Mod: CPTII,S$GLB,, | Performed by: NURSE PRACTITIONER

## 2024-08-12 PROCEDURE — 36415 COLL VENOUS BLD VENIPUNCTURE: CPT

## 2024-08-12 PROCEDURE — 3078F DIAST BP <80 MM HG: CPT | Mod: CPTII,S$GLB,, | Performed by: NURSE PRACTITIONER

## 2024-08-12 PROCEDURE — 99215 OFFICE O/P EST HI 40 MIN: CPT | Mod: S$GLB,,, | Performed by: NURSE PRACTITIONER

## 2024-08-12 PROCEDURE — 99999 PR PBB SHADOW E&M-EST. PATIENT-LVL III: CPT | Mod: PBBFAC,,, | Performed by: NURSE PRACTITIONER

## 2024-08-12 PROCEDURE — 3074F SYST BP LT 130 MM HG: CPT | Mod: CPTII,S$GLB,, | Performed by: NURSE PRACTITIONER

## 2024-08-14 ENCOUNTER — CLINICAL SUPPORT (OUTPATIENT)
Dept: HEMATOLOGY/ONCOLOGY | Facility: CLINIC | Age: 59
End: 2024-08-14
Payer: COMMERCIAL

## 2024-08-14 ENCOUNTER — LAB VISIT (OUTPATIENT)
Dept: LAB | Facility: HOSPITAL | Age: 59
End: 2024-08-14
Attending: NURSE PRACTITIONER
Payer: COMMERCIAL

## 2024-08-14 ENCOUNTER — INFUSION (OUTPATIENT)
Dept: INFUSION THERAPY | Facility: HOSPITAL | Age: 59
End: 2024-08-14
Attending: INTERNAL MEDICINE
Payer: COMMERCIAL

## 2024-08-14 VITALS
TEMPERATURE: 98 F | WEIGHT: 176.81 LBS | HEIGHT: 67 IN | DIASTOLIC BLOOD PRESSURE: 70 MMHG | HEART RATE: 59 BPM | OXYGEN SATURATION: 98 % | SYSTOLIC BLOOD PRESSURE: 122 MMHG | RESPIRATION RATE: 16 BRPM | BODY MASS INDEX: 27.75 KG/M2

## 2024-08-14 DIAGNOSIS — C78.01 MALIGNANT NEOPLASM METASTATIC TO RIGHT LUNG: Primary | ICD-10-CM

## 2024-08-14 DIAGNOSIS — C20 RECTAL CANCER: Primary | ICD-10-CM

## 2024-08-14 DIAGNOSIS — Z13.89 SCREENING FOR HEMATURIA OR PROTEINURIA: ICD-10-CM

## 2024-08-14 DIAGNOSIS — C78.02 SECONDARY MALIGNANCY OF LEFT LUNG: ICD-10-CM

## 2024-08-14 LAB — PROT UR QL STRIP: NEGATIVE

## 2024-08-14 PROCEDURE — 96417 CHEMO IV INFUS EACH ADDL SEQ: CPT

## 2024-08-14 PROCEDURE — 96368 THER/DIAG CONCURRENT INF: CPT

## 2024-08-14 PROCEDURE — 96416 CHEMO PROLONG INFUSE W/PUMP: CPT

## 2024-08-14 PROCEDURE — 96413 CHEMO IV INFUSION 1 HR: CPT

## 2024-08-14 PROCEDURE — 96411 CHEMO IV PUSH ADDL DRUG: CPT

## 2024-08-14 PROCEDURE — 96375 TX/PRO/DX INJ NEW DRUG ADDON: CPT

## 2024-08-14 PROCEDURE — 63600175 PHARM REV CODE 636 W HCPCS: Performed by: NURSE PRACTITIONER

## 2024-08-14 PROCEDURE — 99999 PR PBB SHADOW E&M-EST. PATIENT-LVL I: CPT | Mod: PBBFAC,,,

## 2024-08-14 PROCEDURE — 25000003 PHARM REV CODE 250: Performed by: NURSE PRACTITIONER

## 2024-08-14 PROCEDURE — 96367 TX/PROPH/DG ADDL SEQ IV INF: CPT

## 2024-08-14 PROCEDURE — 81005 URINALYSIS: CPT

## 2024-08-14 PROCEDURE — 96415 CHEMO IV INFUSION ADDL HR: CPT

## 2024-08-14 RX ORDER — SODIUM CHLORIDE 0.9 % (FLUSH) 0.9 %
10 SYRINGE (ML) INJECTION
Status: DISCONTINUED | OUTPATIENT
Start: 2024-08-14 | End: 2024-08-14 | Stop reason: HOSPADM

## 2024-08-14 RX ORDER — ATROPINE SULFATE 0.4 MG/ML
0.4 INJECTION, SOLUTION ENDOTRACHEAL; INTRAMEDULLARY; INTRAMUSCULAR; INTRAVENOUS; SUBCUTANEOUS
Status: COMPLETED | OUTPATIENT
Start: 2024-08-14 | End: 2024-08-14

## 2024-08-14 RX ORDER — EPINEPHRINE 0.3 MG/.3ML
0.3 INJECTION SUBCUTANEOUS ONCE AS NEEDED
Status: DISCONTINUED | OUTPATIENT
Start: 2024-08-14 | End: 2024-08-14 | Stop reason: HOSPADM

## 2024-08-14 RX ORDER — PROCHLORPERAZINE EDISYLATE 5 MG/ML
10 INJECTION INTRAMUSCULAR; INTRAVENOUS ONCE AS NEEDED
Status: CANCELLED | OUTPATIENT
Start: 2024-08-14

## 2024-08-14 RX ORDER — DIPHENHYDRAMINE HYDROCHLORIDE 50 MG/ML
50 INJECTION INTRAMUSCULAR; INTRAVENOUS ONCE AS NEEDED
Status: CANCELLED | OUTPATIENT
Start: 2024-08-14

## 2024-08-14 RX ORDER — SODIUM CHLORIDE 0.9 % (FLUSH) 0.9 %
10 SYRINGE (ML) INJECTION
Status: CANCELLED | OUTPATIENT
Start: 2024-08-16

## 2024-08-14 RX ORDER — FLUOROURACIL 50 MG/ML
400 INJECTION, SOLUTION INTRAVENOUS
Status: COMPLETED | OUTPATIENT
Start: 2024-08-14 | End: 2024-08-14

## 2024-08-14 RX ORDER — PROCHLORPERAZINE EDISYLATE 5 MG/ML
10 INJECTION INTRAMUSCULAR; INTRAVENOUS ONCE AS NEEDED
Status: CANCELLED | OUTPATIENT
Start: 2024-08-16

## 2024-08-14 RX ORDER — DIPHENHYDRAMINE HYDROCHLORIDE 50 MG/ML
50 INJECTION INTRAMUSCULAR; INTRAVENOUS ONCE AS NEEDED
Status: DISCONTINUED | OUTPATIENT
Start: 2024-08-14 | End: 2024-08-14 | Stop reason: HOSPADM

## 2024-08-14 RX ORDER — FLUOROURACIL 50 MG/ML
400 INJECTION, SOLUTION INTRAVENOUS
Status: CANCELLED | OUTPATIENT
Start: 2024-08-14

## 2024-08-14 RX ORDER — PROCHLORPERAZINE EDISYLATE 5 MG/ML
10 INJECTION INTRAMUSCULAR; INTRAVENOUS ONCE AS NEEDED
Status: DISCONTINUED | OUTPATIENT
Start: 2024-08-14 | End: 2024-08-14 | Stop reason: HOSPADM

## 2024-08-14 RX ORDER — ATROPINE SULFATE 0.4 MG/ML
0.4 INJECTION, SOLUTION ENDOTRACHEAL; INTRAMEDULLARY; INTRAMUSCULAR; INTRAVENOUS; SUBCUTANEOUS
Status: CANCELLED
Start: 2024-08-14

## 2024-08-14 RX ORDER — HEPARIN 100 UNIT/ML
500 SYRINGE INTRAVENOUS
Status: CANCELLED | OUTPATIENT
Start: 2024-08-14

## 2024-08-14 RX ORDER — FLUOROURACIL 50 MG/ML
2400 INJECTION, SOLUTION INTRAVENOUS
Status: CANCELLED | OUTPATIENT
Start: 2024-08-14

## 2024-08-14 RX ORDER — EPINEPHRINE 0.3 MG/.3ML
0.3 INJECTION SUBCUTANEOUS ONCE AS NEEDED
Status: CANCELLED | OUTPATIENT
Start: 2024-08-14

## 2024-08-14 RX ORDER — SODIUM CHLORIDE 0.9 % (FLUSH) 0.9 %
10 SYRINGE (ML) INJECTION
Status: CANCELLED | OUTPATIENT
Start: 2024-08-14

## 2024-08-14 RX ORDER — HEPARIN 100 UNIT/ML
500 SYRINGE INTRAVENOUS
Status: CANCELLED | OUTPATIENT
Start: 2024-08-16

## 2024-08-14 RX ORDER — HEPARIN 100 UNIT/ML
500 SYRINGE INTRAVENOUS
Status: DISCONTINUED | OUTPATIENT
Start: 2024-08-14 | End: 2024-08-14 | Stop reason: HOSPADM

## 2024-08-14 RX ADMIN — FLUOROURACIL 775 MG: 50 INJECTION, SOLUTION INTRAVENOUS at 01:08

## 2024-08-14 RX ADMIN — FLUOROURACIL 4650 MG: 50 INJECTION, SOLUTION INTRAVENOUS at 01:08

## 2024-08-14 RX ADMIN — LEUCOVORIN CALCIUM 750 MG: 350 INJECTION, POWDER, LYOPHILIZED, FOR SOLUTION INTRAMUSCULAR; INTRAVENOUS at 11:08

## 2024-08-14 RX ADMIN — ATROPINE SULFATE 0.4 MG: 0.4 INJECTION, SOLUTION INTRAVENOUS at 11:08

## 2024-08-14 RX ADMIN — DEXAMETHASONE SODIUM PHOSPHATE 0.25 MG: 4 INJECTION, SOLUTION INTRA-ARTICULAR; INTRALESIONAL; INTRAMUSCULAR; INTRAVENOUS; SOFT TISSUE at 11:08

## 2024-08-14 RX ADMIN — SODIUM CHLORIDE: 9 INJECTION, SOLUTION INTRAVENOUS at 09:08

## 2024-08-14 RX ADMIN — IRINOTECAN HYDROCHLORIDE 350 MG: 20 INJECTION, SOLUTION INTRAVENOUS at 11:08

## 2024-08-14 RX ADMIN — BEVACIZUMAB-AWWB 400 MG: 400 INJECTION, SOLUTION INTRAVENOUS at 10:08

## 2024-08-14 NOTE — PLAN OF CARE
Plan of care reviewed with patient; patient in agreement. C2D1 completed. Appts reviewed with patient; patient uses portal.

## 2024-08-14 NOTE — PROGRESS NOTES
Oncology Nutrition Evaluation      Alondra Snyder   1965    Oncology Provider:   Ricardo Hernandez MD    Reason for Visit:  Change in Treatment Education    Oncology/Hematology Diagnosis:   Recurrent metastatic rectal cancer  Stage IIIA rectal carcinoma 3/15 (T2 N1b M0); 3/28+ nodes    Treatment Plan:  FOLFIRI + Bevacizumab     Treatment History:  Oxaliplatin/Xeloda x6 completed 8/15  --> Xeloda/XRT completed 11/11/15     Nutrition Recommendations:  1. Regular diet as tolerated    Nutrition Assessment    8/14/24: This is a 59 y.o.female with a medical diagnosis of recurrent rectal CA. She reports good appetite and po intake. No c/o n/v/c/d. Wt has been stable.     Nutrition Factors Affecting Intake  none identified    PMHx: right oophorectomy, partial colectomy     Allergies: Patient has no known allergies.    Current Medications:    Current Outpatient Medications:     LIDOcaine-prilocaine (EMLA) cream, Apply to mediport site approximately 30 minutes prior to access., Disp: 30 g, Rfl: 0    ondansetron (ZOFRAN) 8 MG tablet, Take 1 tablet (8 mg total) by mouth every 6 (six) hours as needed for Nausea., Disp: 20 tablet, Rfl: 5    sertraline (ZOLOFT) 100 MG tablet, Take 100 mg by mouth once daily., Disp: , Rfl:   No current facility-administered medications for this visit.    Facility-Administered Medications Ordered in Other Visits:     0.9% NaCl 250 mL flush bag, , Intravenous, PRN, Carolyn Villarreal, FNP, Last Rate: 25 mL/hr at 08/14/24 0930, New Bag at 08/14/24 0930    alteplase injection 2 mg, 2 mg, Intra-Catheter, PRN, Carolyn Villarreal, FNP    diphenhydrAMINE injection 50 mg, 50 mg, Intravenous, Once PRN, Carolyn Villarreal, SYEDP    EPINEPHrine (EPIPEN) 0.3 mg/0.3 mL pen injection 0.3 mg, 0.3 mg, Intramuscular, Once PRN, Carolyn Villarreal, SYEDP    fluorouracil (Adrucil) 4,650 mg in 0.9% NaCl 100 mL chemo infusion, 4,650 mg, Intravenous, over 46 hr, Phil, Carolyn D, FNP    fluorouraciL injection 775 mg,  "400 mg/m2 (Treatment Plan Recorded), Intravenous, 1 time in Clinic/HOD, Carolyn Villarreal, FNP    heparin, porcine (PF) 100 unit/mL injection flush 500 Units, 500 Units, Intravenous, PRN, Carolyn Villarreal, FNP    hydrocortisone sodium succinate injection 100 mg, 100 mg, Intravenous, Once PRN, Carolyn Villarreal, FNP    irinotecan (CAMPTOSAR) 180 mg/m2 = 350 mg in 0.9% NaCl 582.5 mL chemo infusion, 180 mg/m2 (Treatment Plan Recorded), Intravenous, 1 time in Clinic/HOD, Carolyn Villarreal, FNP, Last Rate: 388.3 mL/hr at 08/14/24 1134, 350 mg at 08/14/24 1134    leucovorin calcium 750 mg in D5W 285 mL infusion, 750 mg, Intravenous, 1 time in Clinic/HOD, Carolyn Villarreal, FNP, Last Rate: 190 mL/hr at 08/14/24 1133, 750 mg at 08/14/24 1133    prochlorperazine injection Soln 10 mg, 10 mg, Intravenous, Once PRN, Carolyn Villarreal, FNP    sodium chloride 0.9% flush 10 mL, 10 mL, Intravenous, PRN, Carolyn Villarreal, FNP    Labs: 8/12/24 alk phos 186 (H)    Anthropometrics    Height:   Ht Readings from Last 1 Encounters:   08/14/24 5' 7" (1.702 m)      Weight:   Wt Readings from Last 1 Encounters:   08/14/24 80.2 kg (176 lb 12.8 oz)        Usual Body Weight: 80.2 kg (176 lb)  % Weight Change: 0    BMI: 27.6 (overweight)    Ideal Weight: 61.3 kg (135 lb)  % Ideal Weight: 130%      Nutrition Diagnosis    No nutrition diagnosis at this time.    Nutrition Risk  low    Nutrition Intervention    Interventions(treatment strategy):  general/healthful diet      Nutrition Monitoring and Evaluation    Ongoing monitoring not warranted at this time. Please consult JOHN prn.        Elisa Ibanez MS, RD, , LDN                                                                                                                                                                                                                                                                                  "

## 2024-08-16 ENCOUNTER — INFUSION (OUTPATIENT)
Dept: INFUSION THERAPY | Facility: HOSPITAL | Age: 59
End: 2024-08-16
Attending: INTERNAL MEDICINE
Payer: COMMERCIAL

## 2024-08-16 VITALS
RESPIRATION RATE: 16 BRPM | HEIGHT: 67 IN | HEART RATE: 63 BPM | DIASTOLIC BLOOD PRESSURE: 61 MMHG | TEMPERATURE: 98 F | SYSTOLIC BLOOD PRESSURE: 94 MMHG | BODY MASS INDEX: 27.75 KG/M2 | OXYGEN SATURATION: 97 % | WEIGHT: 176.81 LBS

## 2024-08-16 DIAGNOSIS — C78.01 MALIGNANT NEOPLASM METASTATIC TO RIGHT LUNG: Primary | ICD-10-CM

## 2024-08-16 DIAGNOSIS — C78.02 SECONDARY MALIGNANCY OF LEFT LUNG: ICD-10-CM

## 2024-08-16 PROCEDURE — 25000003 PHARM REV CODE 250: Performed by: NURSE PRACTITIONER

## 2024-08-16 PROCEDURE — 96523 IRRIG DRUG DELIVERY DEVICE: CPT

## 2024-08-16 PROCEDURE — 63600175 PHARM REV CODE 636 W HCPCS: Performed by: NURSE PRACTITIONER

## 2024-08-16 PROCEDURE — A4216 STERILE WATER/SALINE, 10 ML: HCPCS | Performed by: NURSE PRACTITIONER

## 2024-08-16 RX ORDER — SODIUM CHLORIDE 0.9 % (FLUSH) 0.9 %
10 SYRINGE (ML) INJECTION
Status: DISCONTINUED | OUTPATIENT
Start: 2024-08-16 | End: 2024-08-16 | Stop reason: HOSPADM

## 2024-08-16 RX ORDER — PROCHLORPERAZINE EDISYLATE 5 MG/ML
10 INJECTION INTRAMUSCULAR; INTRAVENOUS ONCE AS NEEDED
Status: DISCONTINUED | OUTPATIENT
Start: 2024-08-16 | End: 2024-08-16 | Stop reason: HOSPADM

## 2024-08-16 RX ORDER — HEPARIN 100 UNIT/ML
500 SYRINGE INTRAVENOUS
Status: DISCONTINUED | OUTPATIENT
Start: 2024-08-16 | End: 2024-08-16 | Stop reason: HOSPADM

## 2024-08-16 RX ADMIN — Medication 10 ML: at 11:08

## 2024-08-16 RX ADMIN — HEPARIN 500 UNITS: 100 SYRINGE at 11:08

## 2024-08-16 NOTE — PLAN OF CARE
Plan of care reviewed with patient; patient in agreement. C2D3 DC CADD completed. Appts reviewed with patient; patient uses portal.

## 2024-08-21 NOTE — PROGRESS NOTES
Subjective:       Patient ID: Alondra Snyder is a 59 y.o. female.    Chief Complaint:  I am doing well with the treatment    Diagnosis: Recurrent metastatic rectal cancer                    Stage IIIA rectal carcinoma 3/15 (T2 N1b M0); 3/28+ nodes     Treatment History  Oxaliplatin/Xeloda x6 completed 8/15  --> Xeloda/XRT completed 11/11/15    Current Treatment: FOLFIRI + Bevacizumab s/p 2 cycles (started 7/31/24)     Clinical History: Patient was referred for a first time screening colonoscopy at the age of 49 by her gynecologist.  She had no abdominal symptoms or complaints, changes in her bowel habits, melena or hematochezia.  Colonoscopy revealed a malignant appearing polyp at the rectosigmoid junction.  Biopsy was positive for adenocarcinoma.  Preoperative CEA level was normal 2 ng/mL.  Chest x-ray was unremarkable.  CT A/P showed a large solid left adnexal mass measuring 4.9 x 6.1 cm.  There was no specific abnormality in the sigmoid colon or rectum and no evidence of metastatic disease.  Pelvic ultrasound confirmed that the lesion was a uterine fibroid.  She underwent a laparoscopic resection with primary reanastomosis 3/18/15.  Final pathology showed a 2 cm moderately differentiated adenocarcinoma invading into but not through the muscularis propria.  3 out of 28 lymph nodes were positive for metastatic involvement. All resection margins were clear.  At the time of surgery, the lesion was delineated to be in the upper rectum. She recovered from her surgery uneventfully.  She completed adjuvant chemotherapy and radiation as documented above. Her Mediport catheter was removed 10/17.    She was lost to follow-up from 9/19 until 6/21/23 due to the COVID-19 pandemic.  She reported no significant problems or health issues in the interim.  She had a screening colonoscopy 1/2/20 that showed a single polyp in the ascending colon which was removed with pathology showing benign polypoid colonic mucosa with no evidence  of adenoma.  Follow-up exam was recommended in 5 years.    Laboratory testing 5/31/24 prior to her annual surveillance visit showed an elevated CEA level of 10.16 ng/mL.  Testing was repeated on 6/6/24 with a level of 9.84 ng/mL.  CT C/A/P 6/10/24 showed a 7.8 cm lobulated soft tissue mass in the left upper lobe extending from the left hilum to the pleural margin.  There was a 1.5 cm right hilar node and small AP window nodes.  There was a stable nodular soft tissue density adjacent to the left uterine margin unchanged from 2019.  CT-guided biopsy 6/17/24 revealed a metastatic adenocarcinoma consistent with colorectal primary.  She had left on a trip to [place] when the results came back.  She arranged to be seen at [facility] while she was in [place].    Workup at [facility]  CT-PET scan 7/12/24:  Large DIANA hypermetabolic mass measuring up to 8.1 cm with central necrosis.  Multiple hypermetabolic hepatic metastases.  MRI abdomen 7/12/24:  At least 7 bipolar hepatic metastases, largest 3.0 cm segment MIGUEL.  MRI brain 7/12/24:  No metastatic disease.    Molecular testing:  HER2 negative.  Pathology QNS for additional molecular studies.    [facility] peripheral blood 8/7/24: +KRAS G12D, FRANCESCO.  Positive mutations of APC, PTEN, TP53 and FBXW7    She was started on palliative chemotherapy with a regimen of FOLFIRI and Bevacizumab 7/31/24.          Interval History  She returns to the office today by herself for a 2 week follow-up visit.  She has now completed 2 cycles of treatment with FOLFIRI + Bevacizumab.  She has tolerated treatment well.  No significant nausea, vomiting, weakness or fatigue.  No significant diarrhea.  No abdominal pain or cramping, no weight loss.  No evidence of bevacizumab induced hypertension or proteinuria.  She reports a good appetite and energy level.  Her CBC today was unremarkable.  A follow-up CEA level is pending.       Review of Systems   Constitutional:  Negative for appetite change,  "fatigue, fever and unexpected weight change.   HENT:  Negative for mouth sores, sore throat and trouble swallowing.    Eyes: Negative.    Respiratory:  Negative for cough, shortness of breath and wheezing.    Cardiovascular:  Negative for chest pain, palpitations and leg swelling.   Gastrointestinal:  Negative for abdominal distention, abdominal pain, constipation, diarrhea and nausea.   Genitourinary:  Negative for dysuria, frequency and urgency.   Musculoskeletal:  Negative for arthralgias and back pain.   Integumentary:  Negative for pallor and rash.   Neurological:  Negative for dizziness, weakness, numbness and headaches.   Hematological:  Negative for adenopathy. Does not bruise/bleed easily.   Psychiatric/Behavioral: Negative.         PMHx:  Endometriosis, anxiety/depression  PSHx:  Tonsils, sinus surgery, right oophorectomy, partial colectomy  SH:  Lifetime nonsmoker, occasional alcohol use.  Lives in [place] with her significant other.  Works as a .  FH:  Mother had kidney cancer.  A maternal aunt and 1st cousin had breast cancer.    Objective:        /83   Pulse 67   Temp 98.2 °F (36.8 °C)   Resp 15   Ht 5' 7" (1.702 m)   Wt 78.1 kg (172 lb 3.2 oz)   SpO2 99%   BMI 26.97 kg/m²    Physical Exam  Constitutional:       Comments: Well-developed white female in NAD   HENT:      Head: Normocephalic.      Mouth/Throat:      Mouth: Mucous membranes are moist.      Pharynx: Oropharynx is clear. No posterior oropharyngeal erythema.   Eyes:      General: No scleral icterus.     Extraocular Movements: Extraocular movements intact.      Pupils: Pupils are equal, round, and reactive to light.   Cardiovascular:      Rate and Rhythm: Normal rate and regular rhythm.      Heart sounds: No murmur heard.     Comments: MediPort catheter right chest wall and MediPort removal incision left chest wall.  Pulmonary:      Comments: Lungs clear to auscultation  Abdominal:      General: Bowel " sounds are normal. There is no distension.      Palpations: Abdomen is soft.      Tenderness: There is no abdominal tenderness.      Comments: Well-healed midline incision below umbilicus and laparoscopic sites. No palpable masses or organomegaly.   Musculoskeletal:         General: No swelling or tenderness. Normal range of motion.      Cervical back: Neck supple. No tenderness.   Skin:     General: Skin is warm and dry.      Findings: No rash.   Neurological:      General: No focal deficit present.      Mental Status: She is alert and oriented to person, place, and time.      Cranial Nerves: No cranial nerve deficit.      Motor: No weakness.       ECOG SCORE    0 - Fully active-able to carry on all pre-disease performance without restriction          LABORATORY  Recent Results (from the past 168 hour(s))   CBC with Differential    Collection Time: 08/26/24 10:52 AM   Result Value Ref Range    WBC 6.37 4.50 - 11.50 x10(3)/mcL    RBC 4.57 4.20 - 5.40 x10(6)/mcL    Hgb 12.9 12.0 - 16.0 g/dL    Hct 39.7 37.0 - 47.0 %    MCV 86.9 80.0 - 94.0 fL    MCH 28.2 27.0 - 31.0 pg    MCHC 32.5 (L) 33.0 - 36.0 g/dL    RDW 13.7 11.5 - 17.0 %    Platelet 590 (H) 130 - 400 x10(3)/mcL    MPV 7.7 7.4 - 10.4 fL    Neut % 46.1 %    Lymph % 39.9 %    Mono % 10.8 %    Eos % 1.9 %    Basophil % 1.1 %    Lymph # 2.54 0.6 - 4.6 x10(3)/mcL    Neut # 2.94 2.1 - 9.2 x10(3)/mcL    Mono # 0.69 0.1 - 1.3 x10(3)/mcL    Eos # 0.12 0 - 0.9 x10(3)/mcL    Baso # 0.07 <=0.2 x10(3)/mcL    IG# 0.01 0 - 0.04 x10(3)/mcL    IG% 0.2 %   Protein,UA (Dipstick)    Collection Time: 08/26/24 11:00 AM   Result Value Ref Range    Protein, UA Trace (A) Negative          Assessment:   Metastatic rectal cancer      Plan:   She will proceed with cycle 3 FOLFIRI + Bevacizumab at full dose on 8/28/24.  She continues to tolerate treatment extremely well.  Follow-up CEA level was drawn today with results pending.  Repeat laboratory testing prior to her next cycle in 2  weeks.    RTC in 4 weeks for a follow-up visit and clinical exam.  Barring any problems, she will have follow-up imaging after 6 cycles of therapy.      TIM HARDY MD     Other Physicians  Dr. Jaimee Sanches

## 2024-08-26 ENCOUNTER — OFFICE VISIT (OUTPATIENT)
Dept: HEMATOLOGY/ONCOLOGY | Facility: CLINIC | Age: 59
End: 2024-08-26
Payer: COMMERCIAL

## 2024-08-26 ENCOUNTER — LAB VISIT (OUTPATIENT)
Dept: LAB | Facility: HOSPITAL | Age: 59
End: 2024-08-26
Attending: NURSE PRACTITIONER
Payer: COMMERCIAL

## 2024-08-26 VITALS
SYSTOLIC BLOOD PRESSURE: 120 MMHG | BODY MASS INDEX: 27.02 KG/M2 | DIASTOLIC BLOOD PRESSURE: 83 MMHG | HEART RATE: 67 BPM | WEIGHT: 172.19 LBS | HEIGHT: 67 IN | OXYGEN SATURATION: 99 % | TEMPERATURE: 98 F | RESPIRATION RATE: 15 BRPM

## 2024-08-26 DIAGNOSIS — C20 RECTAL CANCER: Primary | ICD-10-CM

## 2024-08-26 DIAGNOSIS — C78.01 MALIGNANT NEOPLASM METASTATIC TO RIGHT LUNG: ICD-10-CM

## 2024-08-26 DIAGNOSIS — C78.7 SECONDARY MALIGNANT NEOPLASM OF LIVER: ICD-10-CM

## 2024-08-26 DIAGNOSIS — C20 RECTAL CANCER: ICD-10-CM

## 2024-08-26 DIAGNOSIS — Z13.89 SCREENING FOR HEMATURIA OR PROTEINURIA: ICD-10-CM

## 2024-08-26 LAB
ALBUMIN SERPL-MCNC: 3.2 G/DL (ref 3.4–4.8)
ALBUMIN/GLOB SERPL: 0.9 RATIO (ref 1.1–2)
ALP SERPL-CCNC: 182 UNIT/L (ref 40–150)
ALT SERPL-CCNC: 12 UNIT/L (ref 0–55)
ANION GAP SERPL CALC-SCNC: 11 MEQ/L
AST SERPL-CCNC: 18 UNIT/L (ref 5–34)
BASOPHILS # BLD AUTO: 0.07 X10(3)/MCL
BASOPHILS NFR BLD AUTO: 1.1 %
BILIRUB SERPL-MCNC: 0.3 MG/DL
BUN SERPL-MCNC: 9.3 MG/DL (ref 9.8–20.1)
CALCIUM SERPL-MCNC: 9.5 MG/DL (ref 8.4–10.2)
CEA SERPL-MCNC: 23.92 NG/ML (ref 0–3)
CHLORIDE SERPL-SCNC: 104 MMOL/L (ref 98–107)
CO2 SERPL-SCNC: 27 MMOL/L (ref 23–31)
CREAT SERPL-MCNC: 0.67 MG/DL (ref 0.55–1.02)
CREAT/UREA NIT SERPL: 14
EOSINOPHIL # BLD AUTO: 0.12 X10(3)/MCL (ref 0–0.9)
EOSINOPHIL NFR BLD AUTO: 1.9 %
ERYTHROCYTE [DISTWIDTH] IN BLOOD BY AUTOMATED COUNT: 13.7 % (ref 11.5–17)
GFR SERPLBLD CREATININE-BSD FMLA CKD-EPI: >60 ML/MIN/1.73/M2
GLOBULIN SER-MCNC: 3.7 GM/DL (ref 2.4–3.5)
GLUCOSE SERPL-MCNC: 96 MG/DL (ref 82–115)
HCT VFR BLD AUTO: 39.7 % (ref 37–47)
HGB BLD-MCNC: 12.9 G/DL (ref 12–16)
IMM GRANULOCYTES # BLD AUTO: 0.01 X10(3)/MCL (ref 0–0.04)
IMM GRANULOCYTES NFR BLD AUTO: 0.2 %
LYMPHOCYTES # BLD AUTO: 2.54 X10(3)/MCL (ref 0.6–4.6)
LYMPHOCYTES NFR BLD AUTO: 39.9 %
MCH RBC QN AUTO: 28.2 PG (ref 27–31)
MCHC RBC AUTO-ENTMCNC: 32.5 G/DL (ref 33–36)
MCV RBC AUTO: 86.9 FL (ref 80–94)
MONOCYTES # BLD AUTO: 0.69 X10(3)/MCL (ref 0.1–1.3)
MONOCYTES NFR BLD AUTO: 10.8 %
NEUTROPHILS # BLD AUTO: 2.94 X10(3)/MCL (ref 2.1–9.2)
NEUTROPHILS NFR BLD AUTO: 46.1 %
PLATELET # BLD AUTO: 590 X10(3)/MCL (ref 130–400)
PMV BLD AUTO: 7.7 FL (ref 7.4–10.4)
POTASSIUM SERPL-SCNC: 4.4 MMOL/L (ref 3.5–5.1)
PROT SERPL-MCNC: 6.9 GM/DL (ref 5.8–7.6)
PROT UR QL STRIP: ABNORMAL
RBC # BLD AUTO: 4.57 X10(6)/MCL (ref 4.2–5.4)
SODIUM SERPL-SCNC: 142 MMOL/L (ref 136–145)
WBC # BLD AUTO: 6.37 X10(3)/MCL (ref 4.5–11.5)

## 2024-08-26 PROCEDURE — 99999 PR PBB SHADOW E&M-EST. PATIENT-LVL III: CPT | Mod: PBBFAC,,, | Performed by: INTERNAL MEDICINE

## 2024-08-26 PROCEDURE — 3079F DIAST BP 80-89 MM HG: CPT | Mod: CPTII,S$GLB,, | Performed by: INTERNAL MEDICINE

## 2024-08-26 PROCEDURE — 3008F BODY MASS INDEX DOCD: CPT | Mod: CPTII,S$GLB,, | Performed by: INTERNAL MEDICINE

## 2024-08-26 PROCEDURE — 85025 COMPLETE CBC W/AUTO DIFF WBC: CPT

## 2024-08-26 PROCEDURE — 99214 OFFICE O/P EST MOD 30 MIN: CPT | Mod: S$GLB,,, | Performed by: INTERNAL MEDICINE

## 2024-08-26 PROCEDURE — 81005 URINALYSIS: CPT

## 2024-08-26 PROCEDURE — 3044F HG A1C LEVEL LT 7.0%: CPT | Mod: CPTII,S$GLB,, | Performed by: INTERNAL MEDICINE

## 2024-08-26 PROCEDURE — 80053 COMPREHEN METABOLIC PANEL: CPT

## 2024-08-26 PROCEDURE — 1159F MED LIST DOCD IN RCRD: CPT | Mod: CPTII,S$GLB,, | Performed by: INTERNAL MEDICINE

## 2024-08-26 PROCEDURE — 3074F SYST BP LT 130 MM HG: CPT | Mod: CPTII,S$GLB,, | Performed by: INTERNAL MEDICINE

## 2024-08-26 PROCEDURE — 82378 CARCINOEMBRYONIC ANTIGEN: CPT

## 2024-08-26 PROCEDURE — 1160F RVW MEDS BY RX/DR IN RCRD: CPT | Mod: CPTII,S$GLB,, | Performed by: INTERNAL MEDICINE

## 2024-08-26 PROCEDURE — 36415 COLL VENOUS BLD VENIPUNCTURE: CPT

## 2024-08-28 ENCOUNTER — INFUSION (OUTPATIENT)
Dept: INFUSION THERAPY | Facility: HOSPITAL | Age: 59
End: 2024-08-28
Attending: INTERNAL MEDICINE
Payer: COMMERCIAL

## 2024-08-28 VITALS
TEMPERATURE: 98 F | HEART RATE: 62 BPM | SYSTOLIC BLOOD PRESSURE: 131 MMHG | OXYGEN SATURATION: 96 % | DIASTOLIC BLOOD PRESSURE: 83 MMHG | RESPIRATION RATE: 16 BRPM

## 2024-08-28 DIAGNOSIS — C78.02 SECONDARY MALIGNANCY OF LEFT LUNG: ICD-10-CM

## 2024-08-28 DIAGNOSIS — C78.01 MALIGNANT NEOPLASM METASTATIC TO RIGHT LUNG: Primary | ICD-10-CM

## 2024-08-28 PROCEDURE — 96367 TX/PROPH/DG ADDL SEQ IV INF: CPT

## 2024-08-28 PROCEDURE — 96368 THER/DIAG CONCURRENT INF: CPT

## 2024-08-28 PROCEDURE — 96417 CHEMO IV INFUS EACH ADDL SEQ: CPT

## 2024-08-28 PROCEDURE — 96413 CHEMO IV INFUSION 1 HR: CPT

## 2024-08-28 PROCEDURE — 96415 CHEMO IV INFUSION ADDL HR: CPT

## 2024-08-28 PROCEDURE — 63600175 PHARM REV CODE 636 W HCPCS: Performed by: INTERNAL MEDICINE

## 2024-08-28 PROCEDURE — 25000003 PHARM REV CODE 250: Performed by: INTERNAL MEDICINE

## 2024-08-28 PROCEDURE — 96375 TX/PRO/DX INJ NEW DRUG ADDON: CPT

## 2024-08-28 PROCEDURE — 96416 CHEMO PROLONG INFUSE W/PUMP: CPT

## 2024-08-28 RX ORDER — FLUOROURACIL 50 MG/ML
400 INJECTION, SOLUTION INTRAVENOUS
Status: COMPLETED | OUTPATIENT
Start: 2024-08-28 | End: 2024-08-28

## 2024-08-28 RX ORDER — ATROPINE SULFATE 0.4 MG/ML
0.4 INJECTION, SOLUTION ENDOTRACHEAL; INTRAMEDULLARY; INTRAMUSCULAR; INTRAVENOUS; SUBCUTANEOUS
Status: CANCELLED
Start: 2024-08-28

## 2024-08-28 RX ORDER — ATROPINE SULFATE 0.4 MG/ML
0.4 INJECTION, SOLUTION ENDOTRACHEAL; INTRAMEDULLARY; INTRAMUSCULAR; INTRAVENOUS; SUBCUTANEOUS
Status: COMPLETED | OUTPATIENT
Start: 2024-08-28 | End: 2024-08-28

## 2024-08-28 RX ORDER — HEPARIN 100 UNIT/ML
500 SYRINGE INTRAVENOUS
Status: DISCONTINUED | OUTPATIENT
Start: 2024-08-28 | End: 2024-08-28 | Stop reason: HOSPADM

## 2024-08-28 RX ORDER — HEPARIN 100 UNIT/ML
500 SYRINGE INTRAVENOUS
Status: CANCELLED | OUTPATIENT
Start: 2024-08-28

## 2024-08-28 RX ORDER — EPINEPHRINE 0.3 MG/.3ML
0.3 INJECTION SUBCUTANEOUS ONCE AS NEEDED
Status: DISCONTINUED | OUTPATIENT
Start: 2024-08-28 | End: 2024-08-28 | Stop reason: HOSPADM

## 2024-08-28 RX ORDER — DIPHENHYDRAMINE HYDROCHLORIDE 50 MG/ML
50 INJECTION INTRAMUSCULAR; INTRAVENOUS ONCE AS NEEDED
Status: DISCONTINUED | OUTPATIENT
Start: 2024-08-28 | End: 2024-08-28 | Stop reason: HOSPADM

## 2024-08-28 RX ORDER — PROCHLORPERAZINE EDISYLATE 5 MG/ML
10 INJECTION INTRAMUSCULAR; INTRAVENOUS ONCE AS NEEDED
Status: CANCELLED | OUTPATIENT
Start: 2024-08-28

## 2024-08-28 RX ORDER — DIPHENHYDRAMINE HYDROCHLORIDE 50 MG/ML
50 INJECTION INTRAMUSCULAR; INTRAVENOUS ONCE AS NEEDED
Status: CANCELLED | OUTPATIENT
Start: 2024-08-28

## 2024-08-28 RX ORDER — SODIUM CHLORIDE 0.9 % (FLUSH) 0.9 %
10 SYRINGE (ML) INJECTION
Status: DISCONTINUED | OUTPATIENT
Start: 2024-08-28 | End: 2024-08-28 | Stop reason: HOSPADM

## 2024-08-28 RX ORDER — PROCHLORPERAZINE EDISYLATE 5 MG/ML
10 INJECTION INTRAMUSCULAR; INTRAVENOUS ONCE AS NEEDED
Status: CANCELLED | OUTPATIENT
Start: 2024-08-30

## 2024-08-28 RX ORDER — SODIUM CHLORIDE 0.9 % (FLUSH) 0.9 %
10 SYRINGE (ML) INJECTION
Status: CANCELLED | OUTPATIENT
Start: 2024-08-30

## 2024-08-28 RX ORDER — HEPARIN 100 UNIT/ML
500 SYRINGE INTRAVENOUS
Status: CANCELLED | OUTPATIENT
Start: 2024-08-30

## 2024-08-28 RX ORDER — PROCHLORPERAZINE EDISYLATE 5 MG/ML
10 INJECTION INTRAMUSCULAR; INTRAVENOUS ONCE AS NEEDED
Status: DISCONTINUED | OUTPATIENT
Start: 2024-08-28 | End: 2024-08-28 | Stop reason: HOSPADM

## 2024-08-28 RX ORDER — FLUOROURACIL 50 MG/ML
400 INJECTION, SOLUTION INTRAVENOUS
Status: CANCELLED | OUTPATIENT
Start: 2024-08-28

## 2024-08-28 RX ORDER — EPINEPHRINE 0.3 MG/.3ML
0.3 INJECTION SUBCUTANEOUS ONCE AS NEEDED
Status: CANCELLED | OUTPATIENT
Start: 2024-08-28

## 2024-08-28 RX ORDER — SODIUM CHLORIDE 0.9 % (FLUSH) 0.9 %
10 SYRINGE (ML) INJECTION
Status: CANCELLED | OUTPATIENT
Start: 2024-08-28

## 2024-08-28 RX ADMIN — IRINOTECAN HYDROCHLORIDE 350 MG: 20 INJECTION, SOLUTION INTRAVENOUS at 11:08

## 2024-08-28 RX ADMIN — FLUOROURACIL 4650 MG: 50 INJECTION, SOLUTION INTRAVENOUS at 01:08

## 2024-08-28 RX ADMIN — ATROPINE SULFATE 0.4 MG: 0.4 INJECTION, SOLUTION INTRAVENOUS at 11:08

## 2024-08-28 RX ADMIN — LEUCOVORIN CALCIUM 750 MG: 350 INJECTION, POWDER, LYOPHILIZED, FOR SOLUTION INTRAMUSCULAR; INTRAVENOUS at 11:08

## 2024-08-28 RX ADMIN — SODIUM CHLORIDE: 9 INJECTION, SOLUTION INTRAVENOUS at 11:08

## 2024-08-28 RX ADMIN — DEXAMETHASONE SODIUM PHOSPHATE 0.25 MG: 4 INJECTION, SOLUTION INTRA-ARTICULAR; INTRALESIONAL; INTRAMUSCULAR; INTRAVENOUS; SOFT TISSUE at 11:08

## 2024-08-28 RX ADMIN — BEVACIZUMAB-AWWB 400 MG: 400 INJECTION, SOLUTION INTRAVENOUS at 10:08

## 2024-08-28 RX ADMIN — FLUOROURACIL 775 MG: 50 INJECTION, SOLUTION INTRAVENOUS at 01:08

## 2024-08-28 NOTE — PLAN OF CARE
Problem: Adult Inpatient Plan of Care  Goal: Plan of Care Review  Outcome: Met  Flowsheets (Taken 8/28/2024 1345)  Plan of Care Reviewed With:   patient   spouse  Goal: Absence of Hospital-Acquired Illness or Injury  Outcome: Met  Intervention: Identify and Manage Fall Risk  Flowsheets (Taken 8/28/2024 1345)  Safety Promotion/Fall Prevention:   assistive device/personal item within reach   Fall Risk reviewed with patient/family   in recliner, wheels locked     Next appt reviewed; pt denied questions or further needs at the time of discharge.

## 2024-08-30 ENCOUNTER — INFUSION (OUTPATIENT)
Dept: INFUSION THERAPY | Facility: HOSPITAL | Age: 59
End: 2024-08-30
Attending: INTERNAL MEDICINE
Payer: COMMERCIAL

## 2024-08-30 VITALS
OXYGEN SATURATION: 98 % | HEART RATE: 59 BPM | DIASTOLIC BLOOD PRESSURE: 71 MMHG | RESPIRATION RATE: 16 BRPM | TEMPERATURE: 98 F | SYSTOLIC BLOOD PRESSURE: 111 MMHG

## 2024-08-30 DIAGNOSIS — C78.02 SECONDARY MALIGNANCY OF LEFT LUNG: ICD-10-CM

## 2024-08-30 DIAGNOSIS — C78.01 MALIGNANT NEOPLASM METASTATIC TO RIGHT LUNG: Primary | ICD-10-CM

## 2024-08-30 PROCEDURE — 63600175 PHARM REV CODE 636 W HCPCS: Performed by: INTERNAL MEDICINE

## 2024-08-30 RX ORDER — SODIUM CHLORIDE 0.9 % (FLUSH) 0.9 %
10 SYRINGE (ML) INJECTION
Status: DISCONTINUED | OUTPATIENT
Start: 2024-08-30 | End: 2024-08-30 | Stop reason: HOSPADM

## 2024-08-30 RX ORDER — PROCHLORPERAZINE EDISYLATE 5 MG/ML
10 INJECTION INTRAMUSCULAR; INTRAVENOUS ONCE AS NEEDED
Status: DISCONTINUED | OUTPATIENT
Start: 2024-08-30 | End: 2024-08-30 | Stop reason: HOSPADM

## 2024-08-30 RX ORDER — HEPARIN 100 UNIT/ML
500 SYRINGE INTRAVENOUS
Status: DISCONTINUED | OUTPATIENT
Start: 2024-08-30 | End: 2024-08-30 | Stop reason: HOSPADM

## 2024-08-30 RX ADMIN — HEPARIN 500 UNITS: 100 SYRINGE at 11:08

## 2024-09-08 RX ORDER — FLUOROURACIL 50 MG/ML
2400 INJECTION, SOLUTION INTRAVENOUS
OUTPATIENT
Start: 2024-09-11

## 2024-09-08 RX ORDER — ATROPINE SULFATE 0.4 MG/ML
0.4 INJECTION, SOLUTION ENDOTRACHEAL; INTRAMEDULLARY; INTRAMUSCULAR; INTRAVENOUS; SUBCUTANEOUS
Start: 2024-09-11

## 2024-09-08 RX ORDER — HEPARIN 100 UNIT/ML
500 SYRINGE INTRAVENOUS
OUTPATIENT
Start: 2024-09-11

## 2024-09-08 RX ORDER — PROCHLORPERAZINE EDISYLATE 5 MG/ML
10 INJECTION INTRAMUSCULAR; INTRAVENOUS ONCE AS NEEDED
OUTPATIENT
Start: 2024-09-13

## 2024-09-08 RX ORDER — HEPARIN 100 UNIT/ML
500 SYRINGE INTRAVENOUS
OUTPATIENT
Start: 2024-09-13

## 2024-09-08 RX ORDER — DIPHENHYDRAMINE HYDROCHLORIDE 50 MG/ML
50 INJECTION INTRAMUSCULAR; INTRAVENOUS ONCE AS NEEDED
OUTPATIENT
Start: 2024-09-11

## 2024-09-08 RX ORDER — PROCHLORPERAZINE EDISYLATE 5 MG/ML
10 INJECTION INTRAMUSCULAR; INTRAVENOUS ONCE AS NEEDED
OUTPATIENT
Start: 2024-09-11

## 2024-09-08 RX ORDER — EPINEPHRINE 0.3 MG/.3ML
0.3 INJECTION SUBCUTANEOUS ONCE AS NEEDED
OUTPATIENT
Start: 2024-09-11

## 2024-09-08 RX ORDER — SODIUM CHLORIDE 0.9 % (FLUSH) 0.9 %
10 SYRINGE (ML) INJECTION
OUTPATIENT
Start: 2024-09-11

## 2024-09-08 RX ORDER — FLUOROURACIL 50 MG/ML
400 INJECTION, SOLUTION INTRAVENOUS
OUTPATIENT
Start: 2024-09-11

## 2024-09-08 RX ORDER — SODIUM CHLORIDE 0.9 % (FLUSH) 0.9 %
10 SYRINGE (ML) INJECTION
OUTPATIENT
Start: 2024-09-13

## 2024-09-18 ENCOUNTER — INFUSION (OUTPATIENT)
Dept: INFUSION THERAPY | Facility: HOSPITAL | Age: 59
End: 2024-09-18
Attending: INTERNAL MEDICINE
Payer: COMMERCIAL

## 2024-09-18 ENCOUNTER — LAB VISIT (OUTPATIENT)
Dept: LAB | Facility: HOSPITAL | Age: 59
End: 2024-09-18
Attending: NURSE PRACTITIONER
Payer: COMMERCIAL

## 2024-09-18 VITALS
RESPIRATION RATE: 16 BRPM | SYSTOLIC BLOOD PRESSURE: 111 MMHG | HEART RATE: 68 BPM | DIASTOLIC BLOOD PRESSURE: 43 MMHG | OXYGEN SATURATION: 98 % | TEMPERATURE: 98 F

## 2024-09-18 DIAGNOSIS — C20 RECTAL CANCER: ICD-10-CM

## 2024-09-18 DIAGNOSIS — C78.01 MALIGNANT NEOPLASM METASTATIC TO RIGHT LUNG: ICD-10-CM

## 2024-09-18 DIAGNOSIS — Z13.89 SCREENING FOR HEMATURIA OR PROTEINURIA: ICD-10-CM

## 2024-09-18 DIAGNOSIS — C78.01 MALIGNANT NEOPLASM METASTATIC TO RIGHT LUNG: Primary | ICD-10-CM

## 2024-09-18 DIAGNOSIS — C78.02 SECONDARY MALIGNANCY OF LEFT LUNG: ICD-10-CM

## 2024-09-18 PROBLEM — Z79.899 DRUG-INDUCED IMMUNODEFICIENCY: Status: ACTIVE | Noted: 2024-09-18

## 2024-09-18 PROBLEM — D84.821 DRUG-INDUCED IMMUNODEFICIENCY: Status: ACTIVE | Noted: 2024-09-18

## 2024-09-18 LAB
ALBUMIN SERPL-MCNC: 3.3 G/DL (ref 3.4–4.8)
ALBUMIN/GLOB SERPL: 0.9 RATIO (ref 1.1–2)
ALP SERPL-CCNC: 177 UNIT/L (ref 40–150)
ALT SERPL-CCNC: 12 UNIT/L (ref 0–55)
ANION GAP SERPL CALC-SCNC: 7 MEQ/L
AST SERPL-CCNC: 16 UNIT/L (ref 5–34)
BASOPHILS # BLD AUTO: 0.07 X10(3)/MCL
BASOPHILS NFR BLD AUTO: 0.9 %
BILIRUB SERPL-MCNC: 0.6 MG/DL
BUN SERPL-MCNC: 6.7 MG/DL (ref 9.8–20.1)
CALCIUM SERPL-MCNC: 9.7 MG/DL (ref 8.4–10.2)
CEA SERPL-MCNC: 17.06 NG/ML (ref 0–3)
CHLORIDE SERPL-SCNC: 104 MMOL/L (ref 98–107)
CO2 SERPL-SCNC: 28 MMOL/L (ref 23–31)
CREAT SERPL-MCNC: 0.69 MG/DL (ref 0.55–1.02)
CREAT/UREA NIT SERPL: 10
EOSINOPHIL # BLD AUTO: 0.31 X10(3)/MCL (ref 0–0.9)
EOSINOPHIL NFR BLD AUTO: 4 %
ERYTHROCYTE [DISTWIDTH] IN BLOOD BY AUTOMATED COUNT: 15.7 % (ref 11.5–17)
GFR SERPLBLD CREATININE-BSD FMLA CKD-EPI: >60 ML/MIN/1.73/M2
GLOBULIN SER-MCNC: 3.6 GM/DL (ref 2.4–3.5)
GLUCOSE SERPL-MCNC: 97 MG/DL (ref 82–115)
HCT VFR BLD AUTO: 41 % (ref 37–47)
HGB BLD-MCNC: 13.1 G/DL (ref 12–16)
IMM GRANULOCYTES # BLD AUTO: 0.08 X10(3)/MCL (ref 0–0.04)
IMM GRANULOCYTES NFR BLD AUTO: 1 %
LYMPHOCYTES # BLD AUTO: 2.99 X10(3)/MCL (ref 0.6–4.6)
LYMPHOCYTES NFR BLD AUTO: 38.2 %
MCH RBC QN AUTO: 28.2 PG (ref 27–31)
MCHC RBC AUTO-ENTMCNC: 32 G/DL (ref 33–36)
MCV RBC AUTO: 88.2 FL (ref 80–94)
MONOCYTES # BLD AUTO: 1.2 X10(3)/MCL (ref 0.1–1.3)
MONOCYTES NFR BLD AUTO: 15.3 %
NEUTROPHILS # BLD AUTO: 3.18 X10(3)/MCL (ref 2.1–9.2)
NEUTROPHILS NFR BLD AUTO: 40.6 %
PLATELET # BLD AUTO: 540 X10(3)/MCL (ref 130–400)
PMV BLD AUTO: 8 FL (ref 7.4–10.4)
POTASSIUM SERPL-SCNC: 4.5 MMOL/L (ref 3.5–5.1)
PROT SERPL-MCNC: 6.9 GM/DL (ref 5.8–7.6)
PROT UR QL STRIP: ABNORMAL
RBC # BLD AUTO: 4.65 X10(6)/MCL (ref 4.2–5.4)
SODIUM SERPL-SCNC: 139 MMOL/L (ref 136–145)
WBC # BLD AUTO: 7.83 X10(3)/MCL (ref 4.5–11.5)

## 2024-09-18 PROCEDURE — 96367 TX/PROPH/DG ADDL SEQ IV INF: CPT

## 2024-09-18 PROCEDURE — 96413 CHEMO IV INFUSION 1 HR: CPT

## 2024-09-18 PROCEDURE — 96417 CHEMO IV INFUS EACH ADDL SEQ: CPT

## 2024-09-18 PROCEDURE — 82378 CARCINOEMBRYONIC ANTIGEN: CPT

## 2024-09-18 PROCEDURE — 85025 COMPLETE CBC W/AUTO DIFF WBC: CPT

## 2024-09-18 PROCEDURE — 96411 CHEMO IV PUSH ADDL DRUG: CPT

## 2024-09-18 PROCEDURE — 80053 COMPREHEN METABOLIC PANEL: CPT

## 2024-09-18 PROCEDURE — 36415 COLL VENOUS BLD VENIPUNCTURE: CPT

## 2024-09-18 PROCEDURE — 81005 URINALYSIS: CPT

## 2024-09-18 PROCEDURE — 63600175 PHARM REV CODE 636 W HCPCS: Performed by: INTERNAL MEDICINE

## 2024-09-18 PROCEDURE — 96368 THER/DIAG CONCURRENT INF: CPT

## 2024-09-18 PROCEDURE — 25000003 PHARM REV CODE 250: Performed by: INTERNAL MEDICINE

## 2024-09-18 PROCEDURE — 96375 TX/PRO/DX INJ NEW DRUG ADDON: CPT

## 2024-09-18 RX ORDER — DIPHENHYDRAMINE HYDROCHLORIDE 50 MG/ML
50 INJECTION INTRAMUSCULAR; INTRAVENOUS ONCE AS NEEDED
Status: DISCONTINUED | OUTPATIENT
Start: 2024-09-18 | End: 2024-09-18 | Stop reason: HOSPADM

## 2024-09-18 RX ORDER — PROCHLORPERAZINE EDISYLATE 5 MG/ML
10 INJECTION INTRAMUSCULAR; INTRAVENOUS ONCE AS NEEDED
Status: DISCONTINUED | OUTPATIENT
Start: 2024-09-18 | End: 2024-09-18 | Stop reason: HOSPADM

## 2024-09-18 RX ORDER — ATROPINE SULFATE 0.4 MG/ML
0.4 INJECTION, SOLUTION ENDOTRACHEAL; INTRAMEDULLARY; INTRAMUSCULAR; INTRAVENOUS; SUBCUTANEOUS
Status: COMPLETED | OUTPATIENT
Start: 2024-09-18 | End: 2024-09-18

## 2024-09-18 RX ORDER — FLUOROURACIL 50 MG/ML
400 INJECTION, SOLUTION INTRAVENOUS
Status: COMPLETED | OUTPATIENT
Start: 2024-09-18 | End: 2024-09-18

## 2024-09-18 RX ORDER — HEPARIN 100 UNIT/ML
500 SYRINGE INTRAVENOUS
Status: DISCONTINUED | OUTPATIENT
Start: 2024-09-18 | End: 2024-09-18 | Stop reason: HOSPADM

## 2024-09-18 RX ORDER — EPINEPHRINE 0.3 MG/.3ML
0.3 INJECTION SUBCUTANEOUS ONCE AS NEEDED
Status: DISCONTINUED | OUTPATIENT
Start: 2024-09-18 | End: 2024-09-18 | Stop reason: HOSPADM

## 2024-09-18 RX ORDER — SODIUM CHLORIDE 0.9 % (FLUSH) 0.9 %
10 SYRINGE (ML) INJECTION
Status: DISCONTINUED | OUTPATIENT
Start: 2024-09-18 | End: 2024-09-18 | Stop reason: HOSPADM

## 2024-09-18 RX ADMIN — BEVACIZUMAB-AWWB 400 MG: 400 INJECTION, SOLUTION INTRAVENOUS at 10:09

## 2024-09-18 RX ADMIN — LEUCOVORIN CALCIUM 750 MG: 350 INJECTION, POWDER, LYOPHILIZED, FOR SOLUTION INTRAMUSCULAR; INTRAVENOUS at 10:09

## 2024-09-18 RX ADMIN — DEXAMETHASONE SODIUM PHOSPHATE 0.25 MG: 4 INJECTION, SOLUTION INTRA-ARTICULAR; INTRALESIONAL; INTRAMUSCULAR; INTRAVENOUS; SOFT TISSUE at 10:09

## 2024-09-18 RX ADMIN — IRINOTECAN HYDROCHLORIDE 350 MG: 20 INJECTION, SOLUTION INTRAVENOUS at 11:09

## 2024-09-18 RX ADMIN — ATROPINE SULFATE 0.4 MG: 0.4 INJECTION, SOLUTION INTRAVENOUS at 10:09

## 2024-09-18 RX ADMIN — SODIUM CHLORIDE: 9 INJECTION, SOLUTION INTRAVENOUS at 10:09

## 2024-09-18 RX ADMIN — FLUOROURACIL 775 MG: 50 INJECTION, SOLUTION INTRAVENOUS at 12:09

## 2024-09-18 RX ADMIN — FLUOROURACIL 4650 MG: 50 INJECTION, SOLUTION INTRAVENOUS at 12:09

## 2024-09-18 NOTE — PROGRESS NOTES
"Subjective:       Patient ID: Alondra Snyder is a 59 y.o. female.    Chief Complaint:  :Dryness of nasal passages"    Diagnosis: Recurrent metastatic rectal cancer                    Stage IIIA rectal carcinoma 3/15 (T2 N1b M0); 3/28+ nodes     Treatment History  Oxaliplatin/Xeloda x6 completed 8/15  --> Xeloda/XRT completed 11/11/15    Current Treatment: FOLFIRI + Bevacizumab s/p 4 cycles (started 7/31/24)     Clinical History: Patient was referred for a first time screening colonoscopy at the age of 49 by her gynecologist.  She had no abdominal symptoms or complaints, changes in her bowel habits, melena or hematochezia.  Colonoscopy revealed a malignant appearing polyp at the rectosigmoid junction.  Biopsy was positive for adenocarcinoma.  Preoperative CEA level was normal 2 ng/mL.  Chest x-ray was unremarkable.  CT A/P showed a large solid left adnexal mass measuring 4.9 x 6.1 cm.  There was no specific abnormality in the sigmoid colon or rectum and no evidence of metastatic disease.  Pelvic ultrasound confirmed that the lesion was a uterine fibroid.  She underwent a laparoscopic resection with primary reanastomosis 3/18/15.  Final pathology showed a 2 cm moderately differentiated adenocarcinoma invading into but not through the muscularis propria.  3 out of 28 lymph nodes were positive for metastatic involvement. All resection margins were clear.  At the time of surgery, the lesion was delineated to be in the upper rectum. She recovered from her surgery uneventfully.  She completed adjuvant chemotherapy and radiation as documented above. Her Mediport catheter was removed 10/17.    She was lost to follow-up from 9/19 until 6/21/23 due to the COVID-19 pandemic.  She reported no significant problems or health issues in the interim.  She had a screening colonoscopy 1/2/20 that showed a single polyp in the ascending colon which was removed with pathology showing benign polypoid colonic mucosa with no evidence of " adenoma.  Follow-up exam was recommended in 5 years.    Laboratory testing 5/31/24 prior to her annual surveillance visit showed an elevated CEA level of 10.16 ng/mL.  Testing was repeated on 6/6/24 with a level of 9.84 ng/mL.  CT C/A/P 6/10/24 showed a 7.8 cm lobulated soft tissue mass in the left upper lobe extending from the left hilum to the pleural margin.  There was a 1.5 cm right hilar node and small AP window nodes.  There was a stable nodular soft tissue density adjacent to the left uterine margin unchanged from 2019.  CT-guided biopsy 6/17/24 revealed a metastatic adenocarcinoma consistent with colorectal primary.  She had left on a trip to [place] when the results came back.  She arranged to be seen at [facility] while she was in [place].    Workup at [facility]  CT-PET scan 7/12/24:  Large DIANA hypermetabolic mass measuring up to 8.1 cm with central necrosis.  Multiple hypermetabolic hepatic metastases.  MRI abdomen 7/12/24:  At least 7 bipolar hepatic metastases, largest 3.0 cm segment MIGUEL.  MRI brain 7/12/24:  No metastatic disease.    Molecular testing:  HER2 negative.  Pathology QNS for additional molecular studies.    [facility] peripheral blood 8/7/24: +KRAS G12D, FRANCESCO.  Positive mutations of APC, PTEN, TP53 and FBXW7    She was started on palliative chemotherapy with a regimen of FOLFIRI and Bevacizumab 7/31/24.          Interval History  Ms. Boles returns today for a four week on treatment follow-up visit, accompanied by her significant other.  She feels excellent.  She has now completed 4 cycles of treatment with FOLFIRI + Bevacizumab. Treatment has been very well tolerated.  She has not had any significant diarrhea.  Her cough has improved significantly.  She has a good appetite and energy level.  She has trace proteinuria associated with her Bevacizumab, but her blood pressure is well controlled.  She has not had any significant treatment associated cytopenias and CEA is gradually  "improving.  Her only complaint is dryness of the nasal passages with intermittent bleeding.         Review of Systems   Constitutional:  Negative for appetite change, fatigue, fever and unexpected weight change.   HENT:  Negative for nasal congestion, mouth sores, sore throat and trouble swallowing.         Dryness of nasal passages with mild intermittent bleeding   Eyes: Negative.  Negative for visual disturbance.   Respiratory:  Positive for cough (improved). Negative for shortness of breath and wheezing.    Cardiovascular:  Negative for chest pain, palpitations and leg swelling.   Gastrointestinal:  Negative for abdominal distention, abdominal pain, constipation, diarrhea and nausea.   Genitourinary:  Negative for dysuria, frequency and urgency.   Musculoskeletal:  Negative for arthralgias and back pain.   Integumentary:  Negative for pallor and rash.   Neurological:  Negative for dizziness, weakness, numbness and headaches.   Hematological:  Negative for adenopathy. Does not bruise/bleed easily.   Psychiatric/Behavioral: Negative.  The patient is not nervous/anxious.        PMHx:  Endometriosis, anxiety/depression  PSHx:  Tonsils, sinus surgery, right oophorectomy, partial colectomy  SH:  Lifetime nonsmoker, occasional alcohol use.  Lives in [place] with her significant other.  Works as a .  FH:  Mother had kidney cancer.  A maternal aunt and 1st cousin had breast cancer.    Objective:        /73   Pulse 73   Temp 98.2 °F (36.8 °C)   Resp 15   Ht 5' 7" (1.702 m)   Wt 78.2 kg (172 lb 8 oz)   SpO2 97%   BMI 27.02 kg/m²    Physical Exam  Constitutional:       Comments: Well-developed white female in NAD   HENT:      Head: Normocephalic.      Mouth/Throat:      Mouth: Mucous membranes are moist.      Pharynx: Oropharynx is clear. No posterior oropharyngeal erythema.   Eyes:      General: No scleral icterus.     Extraocular Movements: Extraocular movements intact.      Pupils: " Pupils are equal, round, and reactive to light.   Cardiovascular:      Rate and Rhythm: Normal rate and regular rhythm.      Heart sounds: No murmur heard.     Comments: MediPort catheter right chest wall and MediPort removal incision left chest wall.  Pulmonary:      Comments: Lungs clear to auscultation  Abdominal:      General: Bowel sounds are normal. There is no distension.      Palpations: Abdomen is soft.      Tenderness: There is no abdominal tenderness.      Comments: Well-healed midline incision below umbilicus and laparoscopic sites. No palpable masses or organomegaly.   Musculoskeletal:         General: No swelling or tenderness. Normal range of motion.      Cervical back: Neck supple. No tenderness.   Skin:     General: Skin is warm and dry.      Findings: No rash.   Neurological:      General: No focal deficit present.      Mental Status: She is alert and oriented to person, place, and time.      Cranial Nerves: No cranial nerve deficit.      Motor: No weakness.       ECOG SCORE    1 - Restricted in strenuous activity-ambulatory and able to carry out work of a light nature          LABORATORY  Recent Results (from the past 168 hour(s))   Comprehensive Metabolic Panel    Collection Time: 09/18/24  7:25 AM   Result Value Ref Range    Sodium 139 136 - 145 mmol/L    Potassium 4.5 3.5 - 5.1 mmol/L    Chloride 104 98 - 107 mmol/L    CO2 28 23 - 31 mmol/L    Glucose 97 82 - 115 mg/dL    Blood Urea Nitrogen 6.7 (L) 9.8 - 20.1 mg/dL    Creatinine 0.69 0.55 - 1.02 mg/dL    Calcium 9.7 8.4 - 10.2 mg/dL    Protein Total 6.9 5.8 - 7.6 gm/dL    Albumin 3.3 (L) 3.4 - 4.8 g/dL    Globulin 3.6 (H) 2.4 - 3.5 gm/dL    Albumin/Globulin Ratio 0.9 (L) 1.1 - 2.0 ratio    Bilirubin Total 0.6 <=1.5 mg/dL     (H) 40 - 150 unit/L    ALT 12 0 - 55 unit/L    AST 16 5 - 34 unit/L    eGFR >60 mL/min/1.73/m2    Anion Gap 7.0 mEq/L    BUN/Creatinine Ratio 10    CEA    Collection Time: 09/18/24  7:25 AM   Result Value Ref Range     Carcinoembryonic Antigen 17.06 (H) 0.00 - 3.00 ng/mL   CBC with Differential    Collection Time: 09/18/24  7:25 AM   Result Value Ref Range    WBC 7.83 4.50 - 11.50 x10(3)/mcL    RBC 4.65 4.20 - 5.40 x10(6)/mcL    Hgb 13.1 12.0 - 16.0 g/dL    Hct 41.0 37.0 - 47.0 %    MCV 88.2 80.0 - 94.0 fL    MCH 28.2 27.0 - 31.0 pg    MCHC 32.0 (L) 33.0 - 36.0 g/dL    RDW 15.7 11.5 - 17.0 %    Platelet 540 (H) 130 - 400 x10(3)/mcL    MPV 8.0 7.4 - 10.4 fL    Neut % 40.6 %    Lymph % 38.2 %    Mono % 15.3 %    Eos % 4.0 %    Basophil % 0.9 %    Lymph # 2.99 0.6 - 4.6 x10(3)/mcL    Neut # 3.18 2.1 - 9.2 x10(3)/mcL    Mono # 1.20 0.1 - 1.3 x10(3)/mcL    Eos # 0.31 0 - 0.9 x10(3)/mcL    Baso # 0.07 <=0.2 x10(3)/mcL    IG# 0.08 (H) 0 - 0.04 x10(3)/mcL    IG% 1.0 %   Protein,UA (Dipstick)    Collection Time: 09/18/24  7:32 AM   Result Value Ref Range    Protein, UA Trace (A) Negative   Comprehensive Metabolic Panel    Collection Time: 09/23/24  9:08 AM   Result Value Ref Range    Sodium 138 136 - 145 mmol/L    Potassium 4.2 3.5 - 5.1 mmol/L    Chloride 101 98 - 107 mmol/L    CO2 27 23 - 31 mmol/L    Glucose 106 82 - 115 mg/dL    Blood Urea Nitrogen 12.8 9.8 - 20.1 mg/dL    Creatinine 0.67 0.55 - 1.02 mg/dL    Calcium 9.6 8.4 - 10.2 mg/dL    Protein Total 6.9 5.8 - 7.6 gm/dL    Albumin 3.2 (L) 3.4 - 4.8 g/dL    Globulin 3.7 (H) 2.4 - 3.5 gm/dL    Albumin/Globulin Ratio 0.9 (L) 1.1 - 2.0 ratio    Bilirubin Total 1.0 <=1.5 mg/dL     (H) 40 - 150 unit/L    ALT 13 0 - 55 unit/L    AST 15 5 - 34 unit/L    eGFR >60 mL/min/1.73/m2    Anion Gap 10.0 mEq/L    BUN/Creatinine Ratio 19    CEA    Collection Time: 09/23/24  9:08 AM   Result Value Ref Range    Carcinoembryonic Antigen 16.01 (H) 0.00 - 3.00 ng/mL   CBC with Differential    Collection Time: 09/23/24  9:08 AM   Result Value Ref Range    WBC 8.87 4.50 - 11.50 x10(3)/mcL    RBC 4.55 4.20 - 5.40 x10(6)/mcL    Hgb 13.0 12.0 - 16.0 g/dL    Hct 40.2 37.0 - 47.0 %    MCV 88.4 80.0 -  94.0 fL    MCH 28.6 27.0 - 31.0 pg    MCHC 32.3 (L) 33.0 - 36.0 g/dL    RDW 15.0 11.5 - 17.0 %    Platelet 441 (H) 130 - 400 x10(3)/mcL    MPV 7.9 7.4 - 10.4 fL    Neut % 73.1 %    Lymph % 20.7 %    Mono % 2.6 %    Eos % 2.5 %    Basophil % 0.8 %    Lymph # 1.84 0.6 - 4.6 x10(3)/mcL    Neut # 6.48 2.1 - 9.2 x10(3)/mcL    Mono # 0.23 0.1 - 1.3 x10(3)/mcL    Eos # 0.22 0 - 0.9 x10(3)/mcL    Baso # 0.07 <=0.2 x10(3)/mcL    IG# 0.03 0 - 0.04 x10(3)/mcL    IG% 0.3 %   Protein,UA (Dipstick)    Collection Time: 09/23/24  9:20 AM   Result Value Ref Range    Protein, UA Trace (A) Negative          Assessment:   Metastatic rectal cancer      Plan:   Continue palliative treatment with FOLFIRI + Bevacizumab.  Cycle 5 is due 10/2/24.  Maintain dose/schedule.  CBC, CMP, urine protein every 2 weeks.  RTC 4-5 weeks for follow-up with restaging CT C/A/P prior to visit.  Neosporin or Vaseline to nasal passages PRN.  All questions answered to the satisfaction of the patient.    VARINDER XIE, FNP-C  [facility] [place] [facility]          Other Physicians  Dr. Jaimee Sanches

## 2024-09-18 NOTE — PLAN OF CARE
Problem: Adult Inpatient Plan of Care  Goal: Plan of Care Review  Outcome: Met  Flowsheets (Taken 9/18/2024 1240)  Plan of Care Reviewed With: patient  Goal: Absence of Hospital-Acquired Illness or Injury  Outcome: Met  Intervention: Identify and Manage Fall Risk  Flowsheets (Taken 9/18/2024 1240)  Safety Promotion/Fall Prevention:   assistive device/personal item within reach   in recliner, wheels locked   Fall Risk reviewed with patient/family     Next appt reviewed; pt denied questions or further needs at the time of discharge.

## 2024-09-20 ENCOUNTER — INFUSION (OUTPATIENT)
Dept: INFUSION THERAPY | Facility: HOSPITAL | Age: 59
End: 2024-09-20
Attending: INTERNAL MEDICINE
Payer: COMMERCIAL

## 2024-09-20 VITALS
DIASTOLIC BLOOD PRESSURE: 76 MMHG | BODY MASS INDEX: 26.73 KG/M2 | OXYGEN SATURATION: 96 % | TEMPERATURE: 98 F | HEIGHT: 67 IN | SYSTOLIC BLOOD PRESSURE: 116 MMHG | HEART RATE: 64 BPM | WEIGHT: 170.31 LBS | RESPIRATION RATE: 18 BRPM

## 2024-09-20 DIAGNOSIS — C78.01 MALIGNANT NEOPLASM METASTATIC TO RIGHT LUNG: Primary | ICD-10-CM

## 2024-09-20 DIAGNOSIS — C78.02 SECONDARY MALIGNANCY OF LEFT LUNG: ICD-10-CM

## 2024-09-20 PROCEDURE — 25000003 PHARM REV CODE 250: Performed by: INTERNAL MEDICINE

## 2024-09-20 PROCEDURE — 63600175 PHARM REV CODE 636 W HCPCS: Performed by: INTERNAL MEDICINE

## 2024-09-20 PROCEDURE — A4216 STERILE WATER/SALINE, 10 ML: HCPCS | Performed by: INTERNAL MEDICINE

## 2024-09-20 RX ORDER — HEPARIN 100 UNIT/ML
500 SYRINGE INTRAVENOUS
Status: DISCONTINUED | OUTPATIENT
Start: 2024-09-20 | End: 2024-09-20 | Stop reason: HOSPADM

## 2024-09-20 RX ORDER — SODIUM CHLORIDE 0.9 % (FLUSH) 0.9 %
10 SYRINGE (ML) INJECTION
Status: DISCONTINUED | OUTPATIENT
Start: 2024-09-20 | End: 2024-09-20 | Stop reason: HOSPADM

## 2024-09-20 RX ORDER — PROCHLORPERAZINE EDISYLATE 5 MG/ML
10 INJECTION INTRAMUSCULAR; INTRAVENOUS ONCE AS NEEDED
Status: DISCONTINUED | OUTPATIENT
Start: 2024-09-20 | End: 2024-09-20 | Stop reason: HOSPADM

## 2024-09-20 RX ADMIN — HEPARIN 500 UNITS: 100 SYRINGE at 10:09

## 2024-09-20 RX ADMIN — Medication 10 ML: at 10:09

## 2024-09-20 NOTE — NURSING
Patient here for C4 D3. CADD Pump disconnected, medi-port flushed and de-accessed, tolerated well. Patient discharged in stable condition, aware of future appts.

## 2024-09-23 ENCOUNTER — OFFICE VISIT (OUTPATIENT)
Dept: HEMATOLOGY/ONCOLOGY | Facility: CLINIC | Age: 59
End: 2024-09-23
Payer: COMMERCIAL

## 2024-09-23 ENCOUNTER — LAB VISIT (OUTPATIENT)
Dept: LAB | Facility: HOSPITAL | Age: 59
End: 2024-09-23
Attending: NURSE PRACTITIONER
Payer: COMMERCIAL

## 2024-09-23 VITALS
DIASTOLIC BLOOD PRESSURE: 73 MMHG | SYSTOLIC BLOOD PRESSURE: 104 MMHG | HEART RATE: 73 BPM | TEMPERATURE: 98 F | HEIGHT: 67 IN | RESPIRATION RATE: 15 BRPM | WEIGHT: 172.5 LBS | BODY MASS INDEX: 27.07 KG/M2 | OXYGEN SATURATION: 97 %

## 2024-09-23 DIAGNOSIS — C20 RECTAL CANCER: ICD-10-CM

## 2024-09-23 DIAGNOSIS — C78.01 MALIGNANT NEOPLASM METASTATIC TO RIGHT LUNG: ICD-10-CM

## 2024-09-23 DIAGNOSIS — C78.7 SECONDARY MALIGNANT NEOPLASM OF LIVER: ICD-10-CM

## 2024-09-23 DIAGNOSIS — D84.821 DRUG-INDUCED IMMUNODEFICIENCY: Primary | ICD-10-CM

## 2024-09-23 DIAGNOSIS — Z13.89 SCREENING FOR HEMATURIA OR PROTEINURIA: ICD-10-CM

## 2024-09-23 DIAGNOSIS — Z79.899 DRUG-INDUCED IMMUNODEFICIENCY: Primary | ICD-10-CM

## 2024-09-23 LAB
ALBUMIN SERPL-MCNC: 3.2 G/DL (ref 3.4–4.8)
ALBUMIN/GLOB SERPL: 0.9 RATIO (ref 1.1–2)
ALP SERPL-CCNC: 167 UNIT/L (ref 40–150)
ALT SERPL-CCNC: 13 UNIT/L (ref 0–55)
ANION GAP SERPL CALC-SCNC: 10 MEQ/L
AST SERPL-CCNC: 15 UNIT/L (ref 5–34)
BASOPHILS # BLD AUTO: 0.07 X10(3)/MCL
BASOPHILS NFR BLD AUTO: 0.8 %
BILIRUB SERPL-MCNC: 1 MG/DL
BUN SERPL-MCNC: 12.8 MG/DL (ref 9.8–20.1)
CALCIUM SERPL-MCNC: 9.6 MG/DL (ref 8.4–10.2)
CEA SERPL-MCNC: 16.01 NG/ML (ref 0–3)
CHLORIDE SERPL-SCNC: 101 MMOL/L (ref 98–107)
CO2 SERPL-SCNC: 27 MMOL/L (ref 23–31)
CREAT SERPL-MCNC: 0.67 MG/DL (ref 0.55–1.02)
CREAT/UREA NIT SERPL: 19
EOSINOPHIL # BLD AUTO: 0.22 X10(3)/MCL (ref 0–0.9)
EOSINOPHIL NFR BLD AUTO: 2.5 %
ERYTHROCYTE [DISTWIDTH] IN BLOOD BY AUTOMATED COUNT: 15 % (ref 11.5–17)
GFR SERPLBLD CREATININE-BSD FMLA CKD-EPI: >60 ML/MIN/1.73/M2
GLOBULIN SER-MCNC: 3.7 GM/DL (ref 2.4–3.5)
GLUCOSE SERPL-MCNC: 106 MG/DL (ref 82–115)
HCT VFR BLD AUTO: 40.2 % (ref 37–47)
HGB BLD-MCNC: 13 G/DL (ref 12–16)
IMM GRANULOCYTES # BLD AUTO: 0.03 X10(3)/MCL (ref 0–0.04)
IMM GRANULOCYTES NFR BLD AUTO: 0.3 %
LYMPHOCYTES # BLD AUTO: 1.84 X10(3)/MCL (ref 0.6–4.6)
LYMPHOCYTES NFR BLD AUTO: 20.7 %
MCH RBC QN AUTO: 28.6 PG (ref 27–31)
MCHC RBC AUTO-ENTMCNC: 32.3 G/DL (ref 33–36)
MCV RBC AUTO: 88.4 FL (ref 80–94)
MONOCYTES # BLD AUTO: 0.23 X10(3)/MCL (ref 0.1–1.3)
MONOCYTES NFR BLD AUTO: 2.6 %
NEUTROPHILS # BLD AUTO: 6.48 X10(3)/MCL (ref 2.1–9.2)
NEUTROPHILS NFR BLD AUTO: 73.1 %
PLATELET # BLD AUTO: 441 X10(3)/MCL (ref 130–400)
PMV BLD AUTO: 7.9 FL (ref 7.4–10.4)
POTASSIUM SERPL-SCNC: 4.2 MMOL/L (ref 3.5–5.1)
PROT SERPL-MCNC: 6.9 GM/DL (ref 5.8–7.6)
PROT UR QL STRIP: ABNORMAL
RBC # BLD AUTO: 4.55 X10(6)/MCL (ref 4.2–5.4)
SODIUM SERPL-SCNC: 138 MMOL/L (ref 136–145)
WBC # BLD AUTO: 8.87 X10(3)/MCL (ref 4.5–11.5)

## 2024-09-23 PROCEDURE — 99999 PR PBB SHADOW E&M-EST. PATIENT-LVL IV: CPT | Mod: PBBFAC,,, | Performed by: NURSE PRACTITIONER

## 2024-09-23 PROCEDURE — 1159F MED LIST DOCD IN RCRD: CPT | Mod: CPTII,S$GLB,, | Performed by: NURSE PRACTITIONER

## 2024-09-23 PROCEDURE — 80053 COMPREHEN METABOLIC PANEL: CPT

## 2024-09-23 PROCEDURE — 3044F HG A1C LEVEL LT 7.0%: CPT | Mod: CPTII,S$GLB,, | Performed by: NURSE PRACTITIONER

## 2024-09-23 PROCEDURE — 3078F DIAST BP <80 MM HG: CPT | Mod: CPTII,S$GLB,, | Performed by: NURSE PRACTITIONER

## 2024-09-23 PROCEDURE — 36415 COLL VENOUS BLD VENIPUNCTURE: CPT

## 2024-09-23 PROCEDURE — 81005 URINALYSIS: CPT

## 2024-09-23 PROCEDURE — 82378 CARCINOEMBRYONIC ANTIGEN: CPT

## 2024-09-23 PROCEDURE — 3074F SYST BP LT 130 MM HG: CPT | Mod: CPTII,S$GLB,, | Performed by: NURSE PRACTITIONER

## 2024-09-23 PROCEDURE — 85025 COMPLETE CBC W/AUTO DIFF WBC: CPT

## 2024-09-23 PROCEDURE — 3008F BODY MASS INDEX DOCD: CPT | Mod: CPTII,S$GLB,, | Performed by: NURSE PRACTITIONER

## 2024-09-23 PROCEDURE — 1160F RVW MEDS BY RX/DR IN RCRD: CPT | Mod: CPTII,S$GLB,, | Performed by: NURSE PRACTITIONER

## 2024-09-23 PROCEDURE — 99215 OFFICE O/P EST HI 40 MIN: CPT | Mod: S$GLB,,, | Performed by: NURSE PRACTITIONER

## 2024-09-29 RX ORDER — SODIUM CHLORIDE 0.9 % (FLUSH) 0.9 %
10 SYRINGE (ML) INJECTION
Status: CANCELLED | OUTPATIENT
Start: 2024-10-04

## 2024-09-29 RX ORDER — SODIUM CHLORIDE 0.9 % (FLUSH) 0.9 %
10 SYRINGE (ML) INJECTION
Status: CANCELLED | OUTPATIENT
Start: 2024-10-02

## 2024-09-29 RX ORDER — ATROPINE SULFATE 0.4 MG/ML
0.4 INJECTION, SOLUTION ENDOTRACHEAL; INTRAMEDULLARY; INTRAMUSCULAR; INTRAVENOUS; SUBCUTANEOUS
Status: CANCELLED
Start: 2024-10-02

## 2024-09-29 RX ORDER — FLUOROURACIL 50 MG/ML
400 INJECTION, SOLUTION INTRAVENOUS
Status: CANCELLED | OUTPATIENT
Start: 2024-10-02

## 2024-09-29 RX ORDER — HEPARIN 100 UNIT/ML
500 SYRINGE INTRAVENOUS
Status: CANCELLED | OUTPATIENT
Start: 2024-10-04

## 2024-09-29 RX ORDER — HEPARIN 100 UNIT/ML
500 SYRINGE INTRAVENOUS
Status: CANCELLED | OUTPATIENT
Start: 2024-10-02

## 2024-09-29 RX ORDER — EPINEPHRINE 0.3 MG/.3ML
0.3 INJECTION SUBCUTANEOUS ONCE AS NEEDED
Status: CANCELLED | OUTPATIENT
Start: 2024-10-02

## 2024-09-29 RX ORDER — PROCHLORPERAZINE EDISYLATE 5 MG/ML
10 INJECTION INTRAMUSCULAR; INTRAVENOUS ONCE AS NEEDED
Status: CANCELLED | OUTPATIENT
Start: 2024-10-04

## 2024-09-29 RX ORDER — PROCHLORPERAZINE EDISYLATE 5 MG/ML
10 INJECTION INTRAMUSCULAR; INTRAVENOUS ONCE AS NEEDED
Status: CANCELLED | OUTPATIENT
Start: 2024-10-02

## 2024-09-29 RX ORDER — DIPHENHYDRAMINE HYDROCHLORIDE 50 MG/ML
50 INJECTION INTRAMUSCULAR; INTRAVENOUS ONCE AS NEEDED
Status: CANCELLED | OUTPATIENT
Start: 2024-10-02

## 2024-10-02 ENCOUNTER — INFUSION (OUTPATIENT)
Dept: INFUSION THERAPY | Facility: HOSPITAL | Age: 59
End: 2024-10-02
Attending: INTERNAL MEDICINE
Payer: COMMERCIAL

## 2024-10-02 ENCOUNTER — LAB VISIT (OUTPATIENT)
Dept: LAB | Facility: HOSPITAL | Age: 59
End: 2024-10-02
Attending: NURSE PRACTITIONER
Payer: COMMERCIAL

## 2024-10-02 VITALS
OXYGEN SATURATION: 99 % | RESPIRATION RATE: 18 BRPM | SYSTOLIC BLOOD PRESSURE: 130 MMHG | DIASTOLIC BLOOD PRESSURE: 82 MMHG | TEMPERATURE: 98 F | HEART RATE: 62 BPM

## 2024-10-02 DIAGNOSIS — C78.01 MALIGNANT NEOPLASM METASTATIC TO RIGHT LUNG: Primary | ICD-10-CM

## 2024-10-02 DIAGNOSIS — C78.01 MALIGNANT NEOPLASM METASTATIC TO RIGHT LUNG: ICD-10-CM

## 2024-10-02 DIAGNOSIS — C20 RECTAL CANCER: ICD-10-CM

## 2024-10-02 DIAGNOSIS — C78.02 SECONDARY MALIGNANCY OF LEFT LUNG: ICD-10-CM

## 2024-10-02 LAB
ALBUMIN SERPL-MCNC: 3.4 G/DL (ref 3.4–4.8)
ALBUMIN/GLOB SERPL: 1 RATIO (ref 1.1–2)
ALP SERPL-CCNC: 195 UNIT/L (ref 40–150)
ALT SERPL-CCNC: 13 UNIT/L (ref 0–55)
ANION GAP SERPL CALC-SCNC: 9 MEQ/L
AST SERPL-CCNC: 18 UNIT/L (ref 5–34)
BASOPHILS # BLD AUTO: 0.07 X10(3)/MCL
BASOPHILS NFR BLD AUTO: 1.2 %
BILIRUB SERPL-MCNC: 0.4 MG/DL
BUN SERPL-MCNC: 12.3 MG/DL (ref 9.8–20.1)
CALCIUM SERPL-MCNC: 9.7 MG/DL (ref 8.4–10.2)
CHLORIDE SERPL-SCNC: 102 MMOL/L (ref 98–107)
CO2 SERPL-SCNC: 29 MMOL/L (ref 23–31)
CREAT SERPL-MCNC: 0.64 MG/DL (ref 0.55–1.02)
CREAT/UREA NIT SERPL: 19
EOSINOPHIL # BLD AUTO: 0.29 X10(3)/MCL (ref 0–0.9)
EOSINOPHIL NFR BLD AUTO: 5.1 %
ERYTHROCYTE [DISTWIDTH] IN BLOOD BY AUTOMATED COUNT: 15.7 % (ref 11.5–17)
GFR SERPLBLD CREATININE-BSD FMLA CKD-EPI: >60 ML/MIN/1.73/M2
GLOBULIN SER-MCNC: 3.5 GM/DL (ref 2.4–3.5)
GLUCOSE SERPL-MCNC: 82 MG/DL (ref 82–115)
HCT VFR BLD AUTO: 40.1 % (ref 37–47)
HGB BLD-MCNC: 13 G/DL (ref 12–16)
IMM GRANULOCYTES # BLD AUTO: 0.02 X10(3)/MCL (ref 0–0.04)
IMM GRANULOCYTES NFR BLD AUTO: 0.3 %
LYMPHOCYTES # BLD AUTO: 2.2 X10(3)/MCL (ref 0.6–4.6)
LYMPHOCYTES NFR BLD AUTO: 38.4 %
MCH RBC QN AUTO: 28.4 PG (ref 27–31)
MCHC RBC AUTO-ENTMCNC: 32.4 G/DL (ref 33–36)
MCV RBC AUTO: 87.7 FL (ref 80–94)
MONOCYTES # BLD AUTO: 0.55 X10(3)/MCL (ref 0.1–1.3)
MONOCYTES NFR BLD AUTO: 9.6 %
NEUTROPHILS # BLD AUTO: 2.6 X10(3)/MCL (ref 2.1–9.2)
NEUTROPHILS NFR BLD AUTO: 45.4 %
PLATELET # BLD AUTO: 540 X10(3)/MCL (ref 130–400)
PMV BLD AUTO: 7.8 FL (ref 7.4–10.4)
POTASSIUM SERPL-SCNC: 5.1 MMOL/L (ref 3.5–5.1)
PROT SERPL-MCNC: 6.9 GM/DL (ref 5.8–7.6)
RBC # BLD AUTO: 4.57 X10(6)/MCL (ref 4.2–5.4)
SODIUM SERPL-SCNC: 140 MMOL/L (ref 136–145)
WBC # BLD AUTO: 5.73 X10(3)/MCL (ref 4.5–11.5)

## 2024-10-02 PROCEDURE — 25000003 PHARM REV CODE 250: Performed by: INTERNAL MEDICINE

## 2024-10-02 PROCEDURE — 63600175 PHARM REV CODE 636 W HCPCS: Performed by: INTERNAL MEDICINE

## 2024-10-02 PROCEDURE — 96368 THER/DIAG CONCURRENT INF: CPT

## 2024-10-02 PROCEDURE — 96367 TX/PROPH/DG ADDL SEQ IV INF: CPT

## 2024-10-02 PROCEDURE — 96411 CHEMO IV PUSH ADDL DRUG: CPT

## 2024-10-02 PROCEDURE — 96416 CHEMO PROLONG INFUSE W/PUMP: CPT

## 2024-10-02 PROCEDURE — 36415 COLL VENOUS BLD VENIPUNCTURE: CPT

## 2024-10-02 PROCEDURE — 80053 COMPREHEN METABOLIC PANEL: CPT

## 2024-10-02 PROCEDURE — 96415 CHEMO IV INFUSION ADDL HR: CPT

## 2024-10-02 PROCEDURE — 96375 TX/PRO/DX INJ NEW DRUG ADDON: CPT

## 2024-10-02 PROCEDURE — 85025 COMPLETE CBC W/AUTO DIFF WBC: CPT

## 2024-10-02 PROCEDURE — 96413 CHEMO IV INFUSION 1 HR: CPT

## 2024-10-02 PROCEDURE — 96417 CHEMO IV INFUS EACH ADDL SEQ: CPT

## 2024-10-02 RX ORDER — DIPHENHYDRAMINE HYDROCHLORIDE 50 MG/ML
50 INJECTION INTRAMUSCULAR; INTRAVENOUS ONCE AS NEEDED
Status: DISCONTINUED | OUTPATIENT
Start: 2024-10-02 | End: 2024-10-02 | Stop reason: HOSPADM

## 2024-10-02 RX ORDER — PROCHLORPERAZINE EDISYLATE 5 MG/ML
10 INJECTION INTRAMUSCULAR; INTRAVENOUS ONCE AS NEEDED
Status: DISCONTINUED | OUTPATIENT
Start: 2024-10-02 | End: 2024-10-02 | Stop reason: HOSPADM

## 2024-10-02 RX ORDER — EPINEPHRINE 0.3 MG/.3ML
0.3 INJECTION SUBCUTANEOUS ONCE AS NEEDED
Status: DISCONTINUED | OUTPATIENT
Start: 2024-10-02 | End: 2024-10-02 | Stop reason: HOSPADM

## 2024-10-02 RX ORDER — FLUOROURACIL 50 MG/ML
400 INJECTION, SOLUTION INTRAVENOUS
Status: COMPLETED | OUTPATIENT
Start: 2024-10-02 | End: 2024-10-02

## 2024-10-02 RX ORDER — HEPARIN 100 UNIT/ML
500 SYRINGE INTRAVENOUS
Status: DISCONTINUED | OUTPATIENT
Start: 2024-10-02 | End: 2024-10-02 | Stop reason: HOSPADM

## 2024-10-02 RX ORDER — SODIUM CHLORIDE 0.9 % (FLUSH) 0.9 %
10 SYRINGE (ML) INJECTION
Status: DISCONTINUED | OUTPATIENT
Start: 2024-10-02 | End: 2024-10-02 | Stop reason: HOSPADM

## 2024-10-02 RX ORDER — ATROPINE SULFATE 0.4 MG/ML
0.4 INJECTION, SOLUTION ENDOTRACHEAL; INTRAMEDULLARY; INTRAMUSCULAR; INTRAVENOUS; SUBCUTANEOUS
Status: COMPLETED | OUTPATIENT
Start: 2024-10-02 | End: 2024-10-02

## 2024-10-02 RX ADMIN — FLUOROURACIL 775 MG: 50 INJECTION, SOLUTION INTRAVENOUS at 01:10

## 2024-10-02 RX ADMIN — LEUCOVORIN CALCIUM 750 MG: 350 INJECTION, POWDER, LYOPHILIZED, FOR SOLUTION INTRAMUSCULAR; INTRAVENOUS at 11:10

## 2024-10-02 RX ADMIN — BEVACIZUMAB-AWWB 400 MG: 400 INJECTION, SOLUTION INTRAVENOUS at 10:10

## 2024-10-02 RX ADMIN — DEXAMETHASONE SODIUM PHOSPHATE 0.25 MG: 4 INJECTION, SOLUTION INTRA-ARTICULAR; INTRALESIONAL; INTRAMUSCULAR; INTRAVENOUS; SOFT TISSUE at 11:10

## 2024-10-02 RX ADMIN — FLUOROURACIL 4650 MG: 50 INJECTION, SOLUTION INTRAVENOUS at 01:10

## 2024-10-02 RX ADMIN — IRINOTECAN HYDROCHLORIDE 340 MG: 20 INJECTION, SOLUTION INTRAVENOUS at 11:10

## 2024-10-02 RX ADMIN — SODIUM CHLORIDE: 9 INJECTION, SOLUTION INTRAVENOUS at 10:10

## 2024-10-02 RX ADMIN — ATROPINE SULFATE 0.4 MG: 0.4 INJECTION, SOLUTION INTRAVENOUS at 11:10

## 2024-10-02 NOTE — PLAN OF CARE
C5D1 Folfiri +Mvasi given; cadd pump connected and infusing; tolerated well with no complications; next appointments confirmed with patient; discharged home in stable condition

## 2024-10-04 ENCOUNTER — INFUSION (OUTPATIENT)
Dept: INFUSION THERAPY | Facility: HOSPITAL | Age: 59
End: 2024-10-04
Attending: INTERNAL MEDICINE
Payer: COMMERCIAL

## 2024-10-04 VITALS
DIASTOLIC BLOOD PRESSURE: 77 MMHG | HEART RATE: 65 BPM | SYSTOLIC BLOOD PRESSURE: 121 MMHG | TEMPERATURE: 98 F | OXYGEN SATURATION: 98 % | RESPIRATION RATE: 18 BRPM

## 2024-10-04 DIAGNOSIS — C78.01 MALIGNANT NEOPLASM METASTATIC TO RIGHT LUNG: Primary | ICD-10-CM

## 2024-10-04 DIAGNOSIS — C78.02 SECONDARY MALIGNANCY OF LEFT LUNG: ICD-10-CM

## 2024-10-04 PROCEDURE — 25000003 PHARM REV CODE 250: Performed by: INTERNAL MEDICINE

## 2024-10-04 PROCEDURE — A4216 STERILE WATER/SALINE, 10 ML: HCPCS | Performed by: INTERNAL MEDICINE

## 2024-10-04 PROCEDURE — 63600175 PHARM REV CODE 636 W HCPCS: Performed by: INTERNAL MEDICINE

## 2024-10-04 RX ORDER — PROCHLORPERAZINE EDISYLATE 5 MG/ML
10 INJECTION INTRAMUSCULAR; INTRAVENOUS ONCE AS NEEDED
Status: DISCONTINUED | OUTPATIENT
Start: 2024-10-04 | End: 2024-10-04 | Stop reason: HOSPADM

## 2024-10-04 RX ORDER — SODIUM CHLORIDE 0.9 % (FLUSH) 0.9 %
10 SYRINGE (ML) INJECTION
Status: DISCONTINUED | OUTPATIENT
Start: 2024-10-04 | End: 2024-10-04 | Stop reason: HOSPADM

## 2024-10-04 RX ORDER — HEPARIN 100 UNIT/ML
500 SYRINGE INTRAVENOUS
Status: DISCONTINUED | OUTPATIENT
Start: 2024-10-04 | End: 2024-10-04 | Stop reason: HOSPADM

## 2024-10-04 RX ADMIN — HEPARIN 500 UNITS: 100 SYRINGE at 12:10

## 2024-10-04 RX ADMIN — Medication 10 ML: at 12:10

## 2024-10-04 NOTE — PLAN OF CARE
C5D3 D/C Cadd pump (all medicine was given); tolerated well with no complications; next appointments confirmed with patient; discharged home in stable condition

## 2024-10-10 NOTE — PROGRESS NOTES
Subjective:       Patient ID: Alondra Snyder is a 59 y.o. female.    Chief Complaint:  Worried about scan results    Diagnosis: Recurrent metastatic rectal cancer                    Stage IIIA rectal carcinoma 3/15 (T2 N1b M0); 3/28+ nodes     Treatment History  Oxaliplatin/Xeloda x6 completed 8/15  --> Xeloda/XRT completed 11/11/15    Current Treatment: FOLFIRI + Bevacizumab s/p 6 cycles (started 7/31/24)     Clinical History: Patient was referred for a first time screening colonoscopy at the age of 49 by her gynecologist.  She had no abdominal symptoms or complaints, changes in her bowel habits, melena or hematochezia.  Colonoscopy revealed a malignant appearing polyp at the rectosigmoid junction.  Biopsy was positive for adenocarcinoma.  Preoperative CEA level was normal 2 ng/mL.  Chest x-ray was unremarkable.  CT A/P showed a large solid left adnexal mass measuring 4.9 x 6.1 cm.  There was no specific abnormality in the sigmoid colon or rectum and no evidence of metastatic disease.  Pelvic ultrasound confirmed that the lesion was a uterine fibroid.  She underwent a laparoscopic resection with primary reanastomosis 3/18/15.  Final pathology showed a 2 cm moderately differentiated adenocarcinoma invading into but not through the muscularis propria.  3 out of 28 lymph nodes were positive for metastatic involvement. All resection margins were clear.  At the time of surgery, the lesion was delineated to be in the upper rectum. She recovered from her surgery uneventfully.  She completed adjuvant chemotherapy and radiation as documented above. Her Mediport catheter was removed 10/17.    She was lost to follow-up from 9/19 until 6/21/23 due to the COVID-19 pandemic.  She reported no significant problems or health issues in the interim.  She had a screening colonoscopy 1/2/20 that showed a single polyp in the ascending colon which was removed with pathology showing benign polypoid colonic mucosa with no evidence of  adenoma.  Follow-up exam was recommended in 5 years.    Laboratory testing 5/31/24 prior to her annual surveillance visit showed an elevated CEA level of 10.16 ng/mL.  Testing was repeated on 6/6/24 with a level of 9.84 ng/mL.  CT C/A/P 6/10/24 showed a 7.8 cm lobulated soft tissue mass in the left upper lobe extending from the left hilum to the pleural margin.  There was a 1.5 cm right hilar node and small AP window nodes.  There was a stable nodular soft tissue density adjacent to the left uterine margin unchanged from 2019.  CT-guided biopsy 6/17/24 revealed a metastatic adenocarcinoma consistent with colorectal primary.  She had left on a trip to [place] when the results came back.  She arranged to be seen at [facility] while she was in [place].    Workup at [facility]  CT-PET scan 7/12/24:  Large DIANA hypermetabolic mass measuring up to 8.1 cm with central necrosis.  Multiple hypermetabolic hepatic metastases.  MRI abdomen 7/12/24:  At least 7 bipolar hepatic metastases, largest 3.0 cm segment MIGUEL.  MRI brain 7/12/24:  No metastatic disease.    Molecular testing:  HER2 negative.  Pathology QNS for additional molecular studies.    [facility] peripheral blood 8/7/24: +KRAS G12D, FRANCESCO.  Positive mutations of APC, PTEN, TP53 and FBXW7    She was started on palliative chemotherapy with a regimen of FOLFIRI and Bevacizumab 7/31/24.  CT C/A/P 10/21/24: DIANA mass 5.9 x 5.4 cm (previous 6.9 x 7.3 cm), left hilar LN 1.5 x 1.1 cm (previous 1.8 x 1.5 cm).  Multiple hypodense liver metastases, largest lesion left hepatic lobe 4.9 cm.          Interval History  She returns to the office today for a 4 week follow-up visit accompanied by her significant other.  She has completed 6 cycles of therapy with FOLFIRI and Bevacizumab with excellent tolerance.  She has had mild, intermittent loose stools, but no overt diarrhea.  She has no abdominal symptoms or complaints.  Appetite is excellent, no weight loss.  She denies any chest  "pain, shortness of breath, cough or sputum production.  Restaging CT scans 10/21/24 showed decreased size of the left upper lobe mass as documented above.  Liver lesions were much better seen on the current CT scan versus her previous CT from 6/10/24.  Comparison to an outside CT-PET scan 7/12/24 showed potential enlargement, however the lesions are significantly hypodense which may be due to treatment response.  There were no definite new lesions.  Her serum CEA level has decreased on treatment.       Review of Systems   Constitutional:  Negative for appetite change, fatigue, fever and unexpected weight change.   HENT:  Negative for nasal congestion, mouth sores, sore throat and trouble swallowing.         Dryness of nasal passages   Eyes: Negative.    Respiratory:  Positive for cough (improved). Negative for shortness of breath and wheezing.    Cardiovascular:  Negative for chest pain, palpitations and leg swelling.   Gastrointestinal:  Negative for abdominal distention, abdominal pain, constipation, diarrhea and nausea.   Genitourinary:  Negative for dysuria, flank pain and frequency.   Musculoskeletal:  Negative for arthralgias and back pain.   Integumentary:  Negative for pallor and rash.   Neurological:  Negative for dizziness, weakness, numbness and headaches.   Hematological:  Negative for adenopathy. Does not bruise/bleed easily.   Psychiatric/Behavioral: Negative.         PMHx:  Endometriosis, anxiety/depression  PSHx:  Tonsils, sinus surgery, right oophorectomy, partial colectomy  SH:  Lifetime nonsmoker, occasional alcohol use.  Lives in [place] with her significant other.  Works as a .  FH:  Mother had kidney cancer.  A maternal aunt and 1st cousin had breast cancer.    Objective:        /85 (BP Location: Right arm, Patient Position: Sitting)   Pulse 74   Temp 98 °F (36.7 °C)   Resp 15   Ht 5' 7" (1.702 m)   Wt 79 kg (174 lb 3.2 oz)   SpO2 98%   BMI 27.28 kg/m²  "   Physical Exam  Constitutional:       Comments: Well-developed white female in NAD   HENT:      Head: Normocephalic.      Mouth/Throat:      Mouth: Mucous membranes are moist.      Pharynx: Oropharynx is clear. No posterior oropharyngeal erythema.   Eyes:      General: No scleral icterus.     Extraocular Movements: Extraocular movements intact.      Pupils: Pupils are equal, round, and reactive to light.   Cardiovascular:      Rate and Rhythm: Normal rate and regular rhythm.      Heart sounds: No murmur heard.     Comments: MediPort catheter right chest wall and MediPort removal incision left chest wall.  Pulmonary:      Comments: Lungs clear to auscultation  Abdominal:      General: Bowel sounds are normal. There is no distension.      Palpations: Abdomen is soft.      Tenderness: There is no abdominal tenderness.      Comments: Well-healed midline incision below umbilicus and laparoscopic sites. No palpable masses or organomegaly.   Musculoskeletal:         General: No swelling or tenderness. Normal range of motion.      Cervical back: Neck supple. No tenderness.   Skin:     General: Skin is warm and dry.      Findings: No rash.   Neurological:      General: No focal deficit present.      Mental Status: She is alert and oriented to person, place, and time.      Cranial Nerves: No cranial nerve deficit.      Motor: No weakness.       ECOG SCORE    0 - Fully active-able to carry on all pre-disease performance without restriction          LABORATORY  No results found for this or any previous visit (from the past week).                 8/26/24 9/18/24 9/23/24  CEA        23.9        17.1        16.0    Assessment:   Metastatic rectal cancer      Plan:   CT images were reviewed in the office today in the presence of the patient and her family.  My overall impression is disease improvement.  Liver lesions more well-defined likely as a result of treatment.  She continues to have no symptoms attributable to her  metastatic disease.    Continue treatment with FOLFIRI + Bevacizumab at full dose.  Cycle 7 scheduled for 10/30/24.  RTC in 4 weeks for a follow-up office visit and clinical exam.  She will be scheduled for a CT-PET scan after 4 additional cycles of therapy to ensure ongoing response to treatment.  Treatment plan was reviewed and discussed with the patient in detail.  All questions were answered.      TIM HARDY MD     Other Physicians  Dr. Jaimee Sanches

## 2024-10-13 RX ORDER — SODIUM CHLORIDE 0.9 % (FLUSH) 0.9 %
10 SYRINGE (ML) INJECTION
Status: CANCELLED | OUTPATIENT
Start: 2024-10-18

## 2024-10-13 RX ORDER — SODIUM CHLORIDE 0.9 % (FLUSH) 0.9 %
10 SYRINGE (ML) INJECTION
Status: CANCELLED | OUTPATIENT
Start: 2024-10-16

## 2024-10-13 RX ORDER — HEPARIN 100 UNIT/ML
500 SYRINGE INTRAVENOUS
Status: CANCELLED | OUTPATIENT
Start: 2024-10-18

## 2024-10-13 RX ORDER — HEPARIN 100 UNIT/ML
500 SYRINGE INTRAVENOUS
Status: CANCELLED | OUTPATIENT
Start: 2024-10-16

## 2024-10-13 RX ORDER — DIPHENHYDRAMINE HYDROCHLORIDE 50 MG/ML
50 INJECTION INTRAMUSCULAR; INTRAVENOUS ONCE AS NEEDED
Status: CANCELLED | OUTPATIENT
Start: 2024-10-16

## 2024-10-13 RX ORDER — PROCHLORPERAZINE EDISYLATE 5 MG/ML
10 INJECTION INTRAMUSCULAR; INTRAVENOUS ONCE AS NEEDED
Status: CANCELLED | OUTPATIENT
Start: 2024-10-16

## 2024-10-13 RX ORDER — PROCHLORPERAZINE EDISYLATE 5 MG/ML
10 INJECTION INTRAMUSCULAR; INTRAVENOUS ONCE AS NEEDED
Status: CANCELLED | OUTPATIENT
Start: 2024-10-18

## 2024-10-13 RX ORDER — EPINEPHRINE 0.3 MG/.3ML
0.3 INJECTION SUBCUTANEOUS ONCE AS NEEDED
Status: CANCELLED | OUTPATIENT
Start: 2024-10-16

## 2024-10-13 RX ORDER — ATROPINE SULFATE 0.4 MG/ML
0.4 INJECTION, SOLUTION ENDOTRACHEAL; INTRAMEDULLARY; INTRAMUSCULAR; INTRAVENOUS; SUBCUTANEOUS
Status: CANCELLED
Start: 2024-10-16

## 2024-10-13 RX ORDER — FLUOROURACIL 50 MG/ML
400 INJECTION, SOLUTION INTRAVENOUS
Status: CANCELLED | OUTPATIENT
Start: 2024-10-16

## 2024-10-16 ENCOUNTER — INFUSION (OUTPATIENT)
Dept: INFUSION THERAPY | Facility: HOSPITAL | Age: 59
End: 2024-10-16
Attending: INTERNAL MEDICINE
Payer: COMMERCIAL

## 2024-10-16 ENCOUNTER — LAB VISIT (OUTPATIENT)
Dept: LAB | Facility: HOSPITAL | Age: 59
End: 2024-10-16
Attending: NURSE PRACTITIONER
Payer: COMMERCIAL

## 2024-10-16 VITALS
BODY MASS INDEX: 27 KG/M2 | WEIGHT: 172 LBS | TEMPERATURE: 98 F | RESPIRATION RATE: 18 BRPM | DIASTOLIC BLOOD PRESSURE: 70 MMHG | SYSTOLIC BLOOD PRESSURE: 125 MMHG | OXYGEN SATURATION: 97 % | HEIGHT: 67 IN | HEART RATE: 60 BPM

## 2024-10-16 DIAGNOSIS — Z13.89 SCREENING FOR HEMATURIA OR PROTEINURIA: ICD-10-CM

## 2024-10-16 DIAGNOSIS — C78.01 MALIGNANT NEOPLASM METASTATIC TO RIGHT LUNG: ICD-10-CM

## 2024-10-16 DIAGNOSIS — C78.02 SECONDARY MALIGNANCY OF LEFT LUNG: ICD-10-CM

## 2024-10-16 DIAGNOSIS — C78.01 MALIGNANT NEOPLASM METASTATIC TO RIGHT LUNG: Primary | ICD-10-CM

## 2024-10-16 DIAGNOSIS — C20 RECTAL CANCER: ICD-10-CM

## 2024-10-16 LAB
ALBUMIN SERPL-MCNC: 3.5 G/DL (ref 3.4–4.8)
ALBUMIN/GLOB SERPL: 1 RATIO (ref 1.1–2)
ALP SERPL-CCNC: 147 UNIT/L (ref 40–150)
ALT SERPL-CCNC: 14 UNIT/L (ref 0–55)
ANION GAP SERPL CALC-SCNC: 8 MEQ/L
AST SERPL-CCNC: 20 UNIT/L (ref 5–34)
BASOPHILS # BLD AUTO: 0.05 X10(3)/MCL
BASOPHILS NFR BLD AUTO: 0.9 %
BILIRUB SERPL-MCNC: 0.6 MG/DL
BUN SERPL-MCNC: 13.4 MG/DL (ref 9.8–20.1)
CALCIUM SERPL-MCNC: 9.5 MG/DL (ref 8.4–10.2)
CHLORIDE SERPL-SCNC: 105 MMOL/L (ref 98–107)
CO2 SERPL-SCNC: 28 MMOL/L (ref 23–31)
CREAT SERPL-MCNC: 0.67 MG/DL (ref 0.55–1.02)
CREAT/UREA NIT SERPL: 20
EOSINOPHIL # BLD AUTO: 0.21 X10(3)/MCL (ref 0–0.9)
EOSINOPHIL NFR BLD AUTO: 4 %
ERYTHROCYTE [DISTWIDTH] IN BLOOD BY AUTOMATED COUNT: 17.6 % (ref 11.5–17)
GFR SERPLBLD CREATININE-BSD FMLA CKD-EPI: >60 ML/MIN/1.73/M2
GLOBULIN SER-MCNC: 3.4 GM/DL (ref 2.4–3.5)
GLUCOSE SERPL-MCNC: 72 MG/DL (ref 82–115)
HCT VFR BLD AUTO: 41.1 % (ref 37–47)
HGB BLD-MCNC: 13.3 G/DL (ref 12–16)
IMM GRANULOCYTES # BLD AUTO: 0.02 X10(3)/MCL (ref 0–0.04)
IMM GRANULOCYTES NFR BLD AUTO: 0.4 %
LYMPHOCYTES # BLD AUTO: 2.04 X10(3)/MCL (ref 0.6–4.6)
LYMPHOCYTES NFR BLD AUTO: 38.7 %
MCH RBC QN AUTO: 29.2 PG (ref 27–31)
MCHC RBC AUTO-ENTMCNC: 32.4 G/DL (ref 33–36)
MCV RBC AUTO: 90.3 FL (ref 80–94)
MONOCYTES # BLD AUTO: 0.61 X10(3)/MCL (ref 0.1–1.3)
MONOCYTES NFR BLD AUTO: 11.6 %
NEUTROPHILS # BLD AUTO: 2.34 X10(3)/MCL (ref 2.1–9.2)
NEUTROPHILS NFR BLD AUTO: 44.4 %
PLATELET # BLD AUTO: 347 X10(3)/MCL (ref 130–400)
PMV BLD AUTO: 7.6 FL (ref 7.4–10.4)
POTASSIUM SERPL-SCNC: 4.8 MMOL/L (ref 3.5–5.1)
PROT SERPL-MCNC: 6.9 GM/DL (ref 5.8–7.6)
PROT UR QL STRIP: NEGATIVE
RBC # BLD AUTO: 4.55 X10(6)/MCL (ref 4.2–5.4)
SODIUM SERPL-SCNC: 141 MMOL/L (ref 136–145)
WBC # BLD AUTO: 5.27 X10(3)/MCL (ref 4.5–11.5)

## 2024-10-16 PROCEDURE — 96368 THER/DIAG CONCURRENT INF: CPT

## 2024-10-16 PROCEDURE — 96367 TX/PROPH/DG ADDL SEQ IV INF: CPT

## 2024-10-16 PROCEDURE — 96375 TX/PRO/DX INJ NEW DRUG ADDON: CPT

## 2024-10-16 PROCEDURE — 96413 CHEMO IV INFUSION 1 HR: CPT

## 2024-10-16 PROCEDURE — 80053 COMPREHEN METABOLIC PANEL: CPT

## 2024-10-16 PROCEDURE — 81005 URINALYSIS: CPT

## 2024-10-16 PROCEDURE — 36415 COLL VENOUS BLD VENIPUNCTURE: CPT

## 2024-10-16 PROCEDURE — 96416 CHEMO PROLONG INFUSE W/PUMP: CPT

## 2024-10-16 PROCEDURE — 85025 COMPLETE CBC W/AUTO DIFF WBC: CPT

## 2024-10-16 PROCEDURE — 25000003 PHARM REV CODE 250: Performed by: INTERNAL MEDICINE

## 2024-10-16 PROCEDURE — 96417 CHEMO IV INFUS EACH ADDL SEQ: CPT

## 2024-10-16 PROCEDURE — 96411 CHEMO IV PUSH ADDL DRUG: CPT

## 2024-10-16 PROCEDURE — 63600175 PHARM REV CODE 636 W HCPCS: Performed by: INTERNAL MEDICINE

## 2024-10-16 RX ORDER — SODIUM CHLORIDE 0.9 % (FLUSH) 0.9 %
10 SYRINGE (ML) INJECTION
Status: DISCONTINUED | OUTPATIENT
Start: 2024-10-16 | End: 2024-10-16 | Stop reason: HOSPADM

## 2024-10-16 RX ORDER — HEPARIN 100 UNIT/ML
500 SYRINGE INTRAVENOUS
Status: DISCONTINUED | OUTPATIENT
Start: 2024-10-16 | End: 2024-10-16 | Stop reason: HOSPADM

## 2024-10-16 RX ORDER — EPINEPHRINE 0.3 MG/.3ML
0.3 INJECTION SUBCUTANEOUS ONCE AS NEEDED
Status: DISCONTINUED | OUTPATIENT
Start: 2024-10-16 | End: 2024-10-16 | Stop reason: HOSPADM

## 2024-10-16 RX ORDER — ATROPINE SULFATE 0.4 MG/ML
0.4 INJECTION, SOLUTION ENDOTRACHEAL; INTRAMEDULLARY; INTRAMUSCULAR; INTRAVENOUS; SUBCUTANEOUS
Status: COMPLETED | OUTPATIENT
Start: 2024-10-16 | End: 2024-10-16

## 2024-10-16 RX ORDER — PROCHLORPERAZINE EDISYLATE 5 MG/ML
10 INJECTION INTRAMUSCULAR; INTRAVENOUS ONCE AS NEEDED
Status: DISCONTINUED | OUTPATIENT
Start: 2024-10-16 | End: 2024-10-16 | Stop reason: HOSPADM

## 2024-10-16 RX ORDER — DIPHENHYDRAMINE HYDROCHLORIDE 50 MG/ML
50 INJECTION INTRAMUSCULAR; INTRAVENOUS ONCE AS NEEDED
Status: DISCONTINUED | OUTPATIENT
Start: 2024-10-16 | End: 2024-10-16 | Stop reason: HOSPADM

## 2024-10-16 RX ORDER — FLUOROURACIL 50 MG/ML
400 INJECTION, SOLUTION INTRAVENOUS
Status: COMPLETED | OUTPATIENT
Start: 2024-10-16 | End: 2024-10-16

## 2024-10-16 RX ADMIN — ATROPINE SULFATE 0.4 MG: 0.4 INJECTION, SOLUTION INTRAVENOUS at 11:10

## 2024-10-16 RX ADMIN — DEXAMETHASONE SODIUM PHOSPHATE 0.25 MG: 4 INJECTION, SOLUTION INTRA-ARTICULAR; INTRALESIONAL; INTRAMUSCULAR; INTRAVENOUS; SOFT TISSUE at 11:10

## 2024-10-16 RX ADMIN — BEVACIZUMAB-AWWB 400 MG: 400 INJECTION, SOLUTION INTRAVENOUS at 11:10

## 2024-10-16 RX ADMIN — SODIUM CHLORIDE: 9 INJECTION, SOLUTION INTRAVENOUS at 11:10

## 2024-10-16 RX ADMIN — FLUOROURACIL 775 MG: 50 INJECTION, SOLUTION INTRAVENOUS at 02:10

## 2024-10-16 RX ADMIN — FLUOROURACIL 4650 MG: 50 INJECTION, SOLUTION INTRAVENOUS at 02:10

## 2024-10-16 RX ADMIN — IRINOTECAN HYDROCHLORIDE 340 MG: 20 INJECTION, SOLUTION INTRAVENOUS at 12:10

## 2024-10-16 RX ADMIN — LEUCOVORIN CALCIUM 750 MG: 350 INJECTION, POWDER, LYOPHILIZED, FOR SOLUTION INTRAMUSCULAR; INTRAVENOUS at 12:10

## 2024-10-16 NOTE — PLAN OF CARE
Patient received C6D1, tolerated well. CADD pump connected, will return on Friday around 1215 pm.

## 2024-10-18 ENCOUNTER — INFUSION (OUTPATIENT)
Dept: INFUSION THERAPY | Facility: HOSPITAL | Age: 59
End: 2024-10-18
Attending: INTERNAL MEDICINE
Payer: COMMERCIAL

## 2024-10-18 VITALS
HEART RATE: 61 BPM | TEMPERATURE: 98 F | SYSTOLIC BLOOD PRESSURE: 111 MMHG | RESPIRATION RATE: 18 BRPM | OXYGEN SATURATION: 98 % | DIASTOLIC BLOOD PRESSURE: 65 MMHG

## 2024-10-18 DIAGNOSIS — C78.01 MALIGNANT NEOPLASM METASTATIC TO RIGHT LUNG: Primary | ICD-10-CM

## 2024-10-18 DIAGNOSIS — C78.02 SECONDARY MALIGNANCY OF LEFT LUNG: ICD-10-CM

## 2024-10-18 PROCEDURE — 63600175 PHARM REV CODE 636 W HCPCS: Performed by: INTERNAL MEDICINE

## 2024-10-18 RX ORDER — HEPARIN 100 UNIT/ML
500 SYRINGE INTRAVENOUS
Status: DISCONTINUED | OUTPATIENT
Start: 2024-10-18 | End: 2024-10-18 | Stop reason: HOSPADM

## 2024-10-18 RX ORDER — PROCHLORPERAZINE EDISYLATE 5 MG/ML
10 INJECTION INTRAMUSCULAR; INTRAVENOUS ONCE AS NEEDED
Status: DISCONTINUED | OUTPATIENT
Start: 2024-10-18 | End: 2024-10-18 | Stop reason: HOSPADM

## 2024-10-18 RX ORDER — SODIUM CHLORIDE 0.9 % (FLUSH) 0.9 %
10 SYRINGE (ML) INJECTION
Status: DISCONTINUED | OUTPATIENT
Start: 2024-10-18 | End: 2024-10-18 | Stop reason: HOSPADM

## 2024-10-18 RX ADMIN — HEPARIN 500 UNITS: 100 SYRINGE at 12:10

## 2024-10-23 ENCOUNTER — OFFICE VISIT (OUTPATIENT)
Dept: HEMATOLOGY/ONCOLOGY | Facility: CLINIC | Age: 59
End: 2024-10-23
Payer: COMMERCIAL

## 2024-10-23 VITALS
SYSTOLIC BLOOD PRESSURE: 108 MMHG | DIASTOLIC BLOOD PRESSURE: 85 MMHG | TEMPERATURE: 98 F | BODY MASS INDEX: 27.34 KG/M2 | RESPIRATION RATE: 15 BRPM | OXYGEN SATURATION: 98 % | HEART RATE: 74 BPM | WEIGHT: 174.19 LBS | HEIGHT: 67 IN

## 2024-10-23 DIAGNOSIS — C78.01 MALIGNANT NEOPLASM METASTATIC TO RIGHT LUNG: ICD-10-CM

## 2024-10-23 DIAGNOSIS — C78.7 SECONDARY MALIGNANT NEOPLASM OF LIVER: ICD-10-CM

## 2024-10-23 DIAGNOSIS — C20 RECTAL CANCER: Primary | ICD-10-CM

## 2024-10-23 PROCEDURE — 1160F RVW MEDS BY RX/DR IN RCRD: CPT | Mod: CPTII,S$GLB,, | Performed by: INTERNAL MEDICINE

## 2024-10-23 PROCEDURE — 3044F HG A1C LEVEL LT 7.0%: CPT | Mod: CPTII,S$GLB,, | Performed by: INTERNAL MEDICINE

## 2024-10-23 PROCEDURE — 3008F BODY MASS INDEX DOCD: CPT | Mod: CPTII,S$GLB,, | Performed by: INTERNAL MEDICINE

## 2024-10-23 PROCEDURE — 99999 PR PBB SHADOW E&M-EST. PATIENT-LVL III: CPT | Mod: PBBFAC,,, | Performed by: INTERNAL MEDICINE

## 2024-10-23 PROCEDURE — 3079F DIAST BP 80-89 MM HG: CPT | Mod: CPTII,S$GLB,, | Performed by: INTERNAL MEDICINE

## 2024-10-23 PROCEDURE — 99215 OFFICE O/P EST HI 40 MIN: CPT | Mod: S$GLB,,, | Performed by: INTERNAL MEDICINE

## 2024-10-23 PROCEDURE — 3074F SYST BP LT 130 MM HG: CPT | Mod: CPTII,S$GLB,, | Performed by: INTERNAL MEDICINE

## 2024-10-23 PROCEDURE — 1159F MED LIST DOCD IN RCRD: CPT | Mod: CPTII,S$GLB,, | Performed by: INTERNAL MEDICINE

## 2024-10-28 RX ORDER — HEPARIN 100 UNIT/ML
500 SYRINGE INTRAVENOUS
Status: CANCELLED | OUTPATIENT
Start: 2024-11-01

## 2024-10-28 RX ORDER — SODIUM CHLORIDE 0.9 % (FLUSH) 0.9 %
10 SYRINGE (ML) INJECTION
Status: CANCELLED | OUTPATIENT
Start: 2024-10-30

## 2024-10-28 RX ORDER — FLUOROURACIL 50 MG/ML
400 INJECTION, SOLUTION INTRAVENOUS
Status: CANCELLED | OUTPATIENT
Start: 2024-10-30

## 2024-10-28 RX ORDER — ATROPINE SULFATE 0.4 MG/ML
0.4 INJECTION, SOLUTION ENDOTRACHEAL; INTRAMEDULLARY; INTRAMUSCULAR; INTRAVENOUS; SUBCUTANEOUS
Status: CANCELLED
Start: 2024-10-30

## 2024-10-28 RX ORDER — PROCHLORPERAZINE EDISYLATE 5 MG/ML
10 INJECTION INTRAMUSCULAR; INTRAVENOUS ONCE AS NEEDED
Status: CANCELLED | OUTPATIENT
Start: 2024-11-01

## 2024-10-28 RX ORDER — EPINEPHRINE 0.3 MG/.3ML
0.3 INJECTION SUBCUTANEOUS ONCE AS NEEDED
Status: CANCELLED | OUTPATIENT
Start: 2024-10-30

## 2024-10-28 RX ORDER — DIPHENHYDRAMINE HYDROCHLORIDE 50 MG/ML
50 INJECTION INTRAMUSCULAR; INTRAVENOUS ONCE AS NEEDED
Status: CANCELLED | OUTPATIENT
Start: 2024-10-30

## 2024-10-28 RX ORDER — SODIUM CHLORIDE 0.9 % (FLUSH) 0.9 %
10 SYRINGE (ML) INJECTION
Status: CANCELLED | OUTPATIENT
Start: 2024-11-01

## 2024-10-28 RX ORDER — HEPARIN 100 UNIT/ML
500 SYRINGE INTRAVENOUS
Status: CANCELLED | OUTPATIENT
Start: 2024-10-30

## 2024-10-28 RX ORDER — PROCHLORPERAZINE EDISYLATE 5 MG/ML
10 INJECTION INTRAMUSCULAR; INTRAVENOUS ONCE AS NEEDED
Status: CANCELLED | OUTPATIENT
Start: 2024-10-30

## 2024-10-30 ENCOUNTER — INFUSION (OUTPATIENT)
Dept: INFUSION THERAPY | Facility: HOSPITAL | Age: 59
End: 2024-10-30
Attending: INTERNAL MEDICINE
Payer: COMMERCIAL

## 2024-10-30 ENCOUNTER — LAB VISIT (OUTPATIENT)
Dept: LAB | Facility: HOSPITAL | Age: 59
End: 2024-10-30
Attending: INTERNAL MEDICINE
Payer: COMMERCIAL

## 2024-10-30 VITALS
SYSTOLIC BLOOD PRESSURE: 122 MMHG | TEMPERATURE: 98 F | HEART RATE: 70 BPM | OXYGEN SATURATION: 98 % | RESPIRATION RATE: 16 BRPM | DIASTOLIC BLOOD PRESSURE: 79 MMHG

## 2024-10-30 DIAGNOSIS — C78.02 SECONDARY MALIGNANCY OF LEFT LUNG: ICD-10-CM

## 2024-10-30 DIAGNOSIS — Z13.89 SCREENING FOR HEMATURIA OR PROTEINURIA: ICD-10-CM

## 2024-10-30 DIAGNOSIS — C78.01 MALIGNANT NEOPLASM METASTATIC TO RIGHT LUNG: ICD-10-CM

## 2024-10-30 DIAGNOSIS — C78.01 MALIGNANT NEOPLASM METASTATIC TO RIGHT LUNG: Primary | ICD-10-CM

## 2024-10-30 DIAGNOSIS — C78.7 SECONDARY MALIGNANT NEOPLASM OF LIVER: ICD-10-CM

## 2024-10-30 LAB
BASOPHILS # BLD AUTO: 0.07 X10(3)/MCL
BASOPHILS NFR BLD AUTO: 1.3 %
EOSINOPHIL # BLD AUTO: 0.32 X10(3)/MCL (ref 0–0.9)
EOSINOPHIL NFR BLD AUTO: 5.9 %
ERYTHROCYTE [DISTWIDTH] IN BLOOD BY AUTOMATED COUNT: 18.9 % (ref 11.5–17)
HCT VFR BLD AUTO: 39.2 % (ref 37–47)
HGB BLD-MCNC: 12.6 G/DL (ref 12–16)
IMM GRANULOCYTES # BLD AUTO: 0.01 X10(3)/MCL (ref 0–0.04)
IMM GRANULOCYTES NFR BLD AUTO: 0.2 %
LYMPHOCYTES # BLD AUTO: 1.95 X10(3)/MCL (ref 0.6–4.6)
LYMPHOCYTES NFR BLD AUTO: 36.2 %
MCH RBC QN AUTO: 29.6 PG (ref 27–31)
MCHC RBC AUTO-ENTMCNC: 32.1 G/DL (ref 33–36)
MCV RBC AUTO: 92 FL (ref 80–94)
MONOCYTES # BLD AUTO: 0.61 X10(3)/MCL (ref 0.1–1.3)
MONOCYTES NFR BLD AUTO: 11.3 %
NEUTROPHILS # BLD AUTO: 2.43 X10(3)/MCL (ref 2.1–9.2)
NEUTROPHILS NFR BLD AUTO: 45.1 %
PLATELET # BLD AUTO: 382 X10(3)/MCL (ref 130–400)
PMV BLD AUTO: 7.7 FL (ref 7.4–10.4)
PROT UR QL STRIP: NEGATIVE
RBC # BLD AUTO: 4.26 X10(6)/MCL (ref 4.2–5.4)
WBC # BLD AUTO: 5.39 X10(3)/MCL (ref 4.5–11.5)

## 2024-10-30 PROCEDURE — 96367 TX/PROPH/DG ADDL SEQ IV INF: CPT

## 2024-10-30 PROCEDURE — 96411 CHEMO IV PUSH ADDL DRUG: CPT

## 2024-10-30 PROCEDURE — 36415 COLL VENOUS BLD VENIPUNCTURE: CPT

## 2024-10-30 PROCEDURE — 96415 CHEMO IV INFUSION ADDL HR: CPT

## 2024-10-30 PROCEDURE — 25000003 PHARM REV CODE 250: Performed by: INTERNAL MEDICINE

## 2024-10-30 PROCEDURE — 96375 TX/PRO/DX INJ NEW DRUG ADDON: CPT

## 2024-10-30 PROCEDURE — 96416 CHEMO PROLONG INFUSE W/PUMP: CPT

## 2024-10-30 PROCEDURE — 96413 CHEMO IV INFUSION 1 HR: CPT

## 2024-10-30 PROCEDURE — 96368 THER/DIAG CONCURRENT INF: CPT

## 2024-10-30 PROCEDURE — 96417 CHEMO IV INFUS EACH ADDL SEQ: CPT

## 2024-10-30 PROCEDURE — 81005 URINALYSIS: CPT

## 2024-10-30 PROCEDURE — 63600175 PHARM REV CODE 636 W HCPCS: Performed by: INTERNAL MEDICINE

## 2024-10-30 PROCEDURE — 85025 COMPLETE CBC W/AUTO DIFF WBC: CPT

## 2024-10-30 RX ORDER — ATROPINE SULFATE 0.4 MG/ML
0.4 INJECTION, SOLUTION ENDOTRACHEAL; INTRAMEDULLARY; INTRAMUSCULAR; INTRAVENOUS; SUBCUTANEOUS
Status: COMPLETED | OUTPATIENT
Start: 2024-10-30 | End: 2024-10-30

## 2024-10-30 RX ORDER — HEPARIN 100 UNIT/ML
500 SYRINGE INTRAVENOUS
Status: DISCONTINUED | OUTPATIENT
Start: 2024-10-30 | End: 2024-10-30 | Stop reason: HOSPADM

## 2024-10-30 RX ORDER — SODIUM CHLORIDE 0.9 % (FLUSH) 0.9 %
10 SYRINGE (ML) INJECTION
Status: DISCONTINUED | OUTPATIENT
Start: 2024-10-30 | End: 2024-10-30 | Stop reason: HOSPADM

## 2024-10-30 RX ORDER — EPINEPHRINE 0.3 MG/.3ML
0.3 INJECTION SUBCUTANEOUS ONCE AS NEEDED
Status: DISCONTINUED | OUTPATIENT
Start: 2024-10-30 | End: 2024-10-30 | Stop reason: HOSPADM

## 2024-10-30 RX ORDER — FLUOROURACIL 50 MG/ML
400 INJECTION, SOLUTION INTRAVENOUS
Status: COMPLETED | OUTPATIENT
Start: 2024-10-30 | End: 2024-10-30

## 2024-10-30 RX ORDER — PROCHLORPERAZINE EDISYLATE 5 MG/ML
10 INJECTION INTRAMUSCULAR; INTRAVENOUS ONCE AS NEEDED
Status: DISCONTINUED | OUTPATIENT
Start: 2024-10-30 | End: 2024-10-30 | Stop reason: HOSPADM

## 2024-10-30 RX ORDER — DIPHENHYDRAMINE HYDROCHLORIDE 50 MG/ML
50 INJECTION INTRAMUSCULAR; INTRAVENOUS ONCE AS NEEDED
Status: DISCONTINUED | OUTPATIENT
Start: 2024-10-30 | End: 2024-10-30 | Stop reason: HOSPADM

## 2024-10-30 RX ADMIN — DEXAMETHASONE SODIUM PHOSPHATE 0.25 MG: 4 INJECTION, SOLUTION INTRA-ARTICULAR; INTRALESIONAL; INTRAMUSCULAR; INTRAVENOUS; SOFT TISSUE at 12:10

## 2024-10-30 RX ADMIN — IRINOTECAN HYDROCHLORIDE 340 MG: 20 INJECTION, SOLUTION INTRAVENOUS at 01:10

## 2024-10-30 RX ADMIN — FLUOROURACIL 4500 MG: 50 INJECTION, SOLUTION INTRAVENOUS at 02:10

## 2024-10-30 RX ADMIN — BEVACIZUMAB-AWWB 400 MG: 400 INJECTION, SOLUTION INTRAVENOUS at 12:10

## 2024-10-30 RX ADMIN — ATROPINE SULFATE 0.4 MG: 0.4 INJECTION, SOLUTION INTRAVENOUS at 01:10

## 2024-10-30 RX ADMIN — LEUCOVORIN CALCIUM 750 MG: 350 INJECTION, POWDER, LYOPHILIZED, FOR SOLUTION INTRAMUSCULAR; INTRAVENOUS at 01:10

## 2024-10-30 RX ADMIN — FLUOROURACIL 775 MG: 50 INJECTION, SOLUTION INTRAVENOUS at 02:10

## 2024-10-30 RX ADMIN — SODIUM CHLORIDE: 9 INJECTION, SOLUTION INTRAVENOUS at 01:10

## 2024-11-01 ENCOUNTER — INFUSION (OUTPATIENT)
Dept: INFUSION THERAPY | Facility: HOSPITAL | Age: 59
End: 2024-11-01
Attending: INTERNAL MEDICINE
Payer: COMMERCIAL

## 2024-11-01 VITALS
HEART RATE: 53 BPM | OXYGEN SATURATION: 97 % | DIASTOLIC BLOOD PRESSURE: 80 MMHG | TEMPERATURE: 99 F | RESPIRATION RATE: 16 BRPM | SYSTOLIC BLOOD PRESSURE: 126 MMHG

## 2024-11-01 DIAGNOSIS — C78.02 SECONDARY MALIGNANCY OF LEFT LUNG: ICD-10-CM

## 2024-11-01 DIAGNOSIS — C78.01 MALIGNANT NEOPLASM METASTATIC TO RIGHT LUNG: Primary | ICD-10-CM

## 2024-11-01 PROCEDURE — A4216 STERILE WATER/SALINE, 10 ML: HCPCS | Performed by: INTERNAL MEDICINE

## 2024-11-01 PROCEDURE — 25000003 PHARM REV CODE 250: Performed by: INTERNAL MEDICINE

## 2024-11-01 PROCEDURE — 63600175 PHARM REV CODE 636 W HCPCS: Performed by: INTERNAL MEDICINE

## 2024-11-01 RX ORDER — HEPARIN 100 UNIT/ML
500 SYRINGE INTRAVENOUS
Status: DISCONTINUED | OUTPATIENT
Start: 2024-11-01 | End: 2024-11-01 | Stop reason: HOSPADM

## 2024-11-01 RX ORDER — SODIUM CHLORIDE 0.9 % (FLUSH) 0.9 %
10 SYRINGE (ML) INJECTION
Status: DISCONTINUED | OUTPATIENT
Start: 2024-11-01 | End: 2024-11-01 | Stop reason: HOSPADM

## 2024-11-01 RX ORDER — PROCHLORPERAZINE EDISYLATE 5 MG/ML
10 INJECTION INTRAMUSCULAR; INTRAVENOUS ONCE AS NEEDED
Status: DISCONTINUED | OUTPATIENT
Start: 2024-11-01 | End: 2024-11-01 | Stop reason: HOSPADM

## 2024-11-01 RX ADMIN — Medication 10 ML: at 01:11

## 2024-11-01 RX ADMIN — HEPARIN 500 UNITS: 100 SYRINGE at 01:11

## 2024-11-05 NOTE — PROGRESS NOTES
Subjective:       Patient ID: Alondra Snyder is a 59 y.o. female.    Chief Complaint:  I feel pretty good    Diagnosis: Recurrent metastatic rectal cancer                    Stage IIIA rectal carcinoma 3/15 (T2 N1b M0); 3/28+ nodes     Treatment History  Oxaliplatin/Xeloda x6 completed 8/15  --> Xeloda/XRT completed 11/11/15    Current Treatment: FOLFIRI + Bevacizumab s/p 8 cycles (started 7/31/24)     Clinical History: Patient was referred for a first time screening colonoscopy at the age of 49 by her gynecologist.  She had no abdominal symptoms or complaints, changes in her bowel habits, melena or hematochezia.  Colonoscopy revealed a malignant appearing polyp at the rectosigmoid junction.  Biopsy was positive for adenocarcinoma.  Preoperative CEA level was normal 2 ng/mL.  Chest x-ray was unremarkable.  CT A/P showed a large solid left adnexal mass measuring 4.9 x 6.1 cm.  There was no specific abnormality in the sigmoid colon or rectum and no evidence of metastatic disease.  Pelvic ultrasound confirmed that the lesion was a uterine fibroid.  She underwent a laparoscopic resection with primary reanastomosis 3/18/15.  Final pathology showed a 2 cm moderately differentiated adenocarcinoma invading into but not through the muscularis propria.  3 out of 28 lymph nodes were positive for metastatic involvement. All resection margins were clear.  At the time of surgery, the lesion was delineated to be in the upper rectum. She recovered from her surgery uneventfully.  She completed adjuvant chemotherapy and radiation as documented above. Her Mediport catheter was removed 10/17.    She was lost to follow-up from 9/19 until 6/21/23 due to the COVID-19 pandemic.  She reported no significant problems or health issues in the interim.  She had a screening colonoscopy 1/2/20 that showed a single polyp in the ascending colon which was removed with pathology showing benign polypoid colonic mucosa with no evidence of adenoma.   Follow-up exam was recommended in 5 years.    Laboratory testing 5/31/24 prior to her annual surveillance visit showed an elevated CEA level of 10.16 ng/mL.  Testing was repeated on 6/6/24 with a level of 9.84 ng/mL.  CT C/A/P 6/10/24 showed a 7.8 cm lobulated soft tissue mass in the left upper lobe extending from the left hilum to the pleural margin.  There was a 1.5 cm right hilar node and small AP window nodes.  There was a stable nodular soft tissue density adjacent to the left uterine margin unchanged from 2019.  CT-guided biopsy 6/17/24 revealed a metastatic adenocarcinoma consistent with colorectal primary.  She had left on a trip to [place] when the results came back.  She arranged to be seen at [facility] while she was in [place].    Workup at [facility]  CT-PET scan 7/12/24:  Large DIANA hypermetabolic mass measuring up to 8.1 cm with central necrosis.  Multiple hypermetabolic hepatic metastases.  MRI abdomen 7/12/24:  At least 7 bipolar hepatic metastases, largest 3.0 cm segment MIGUEL.  MRI brain 7/12/24:  No metastatic disease.    Molecular testing:  HER2 negative.  Pathology QNS for additional molecular studies.    [facility] peripheral blood 8/7/24: +KRAS G12D, FRANCESCO.  Positive mutations of APC, PTEN, TP53 and FBXW7    She was started on palliative chemotherapy with a regimen of FOLFIRI and Bevacizumab 7/31/24.  CT C/A/P 10/21/24: DIANA mass 5.9 x 5.4 cm (previous 6.9 x 7.3 cm), left hilar LN 1.5 x 1.1 cm (previous 1.8 x 1.5 cm).  Multiple hypodense liver metastases, largest lesion left hepatic lobe 4.9 cm.          Interval History  She returns to clinic today for a 4 week follow-up visit accompanied by her significant other.  She has now completed 8 cycles of therapy with FOLFIRI and bevacizumab at full dose.  She continues to tolerate treatment extremely well.  No evidence of significant or cumulative toxicities.  She has not had any significant diarrhea, mucositis, skin changes or hand-foot syndrome.   "She has no abdominal symptoms or complaints.  Appetite is good, her weight is stable.  Serum CEA level continues to decrease on therapy.       Review of Systems   Constitutional:  Negative for appetite change, fatigue, fever and unexpected weight change.   HENT:  Negative for nasal congestion, mouth sores, sore throat and trouble swallowing.    Eyes: Negative.    Respiratory:  Negative for cough, shortness of breath and wheezing.    Cardiovascular:  Negative for chest pain, palpitations and leg swelling.   Gastrointestinal:  Negative for abdominal distention, abdominal pain, constipation, diarrhea and nausea.   Genitourinary:  Negative for dysuria, flank pain and frequency.   Musculoskeletal:  Negative for arthralgias and back pain.   Integumentary:  Negative for pallor and rash.   Neurological:  Negative for dizziness, weakness, numbness and headaches.   Hematological:  Negative for adenopathy. Does not bruise/bleed easily.   Psychiatric/Behavioral: Negative.         PMHx:  Endometriosis, anxiety/depression  PSHx:  Tonsils, sinus surgery, right oophorectomy, partial colectomy  SH:  Lifetime nonsmoker, occasional alcohol use.  Lives in [place] with her significant other.  Works as a .  FH:  Mother had kidney cancer.  A maternal aunt and 1st cousin had breast cancer.    Objective:        /69   Pulse 85   Temp 97.8 °F (36.6 °C)   Resp 15   Ht 5' 7" (1.702 m)   Wt 78.9 kg (174 lb)   SpO2 98%   BMI 27.25 kg/m²    Physical Exam  Constitutional:       Comments: Well-developed white female in NAD   HENT:      Head: Normocephalic.      Mouth/Throat:      Mouth: Mucous membranes are moist.      Pharynx: Oropharynx is clear. No posterior oropharyngeal erythema.   Eyes:      General: No scleral icterus.     Extraocular Movements: Extraocular movements intact.      Pupils: Pupils are equal, round, and reactive to light.   Cardiovascular:      Rate and Rhythm: Normal rate and regular " rhythm.      Heart sounds: No murmur heard.     Comments: MediPort catheter right chest wall and MediPort removal incision left chest wall.  Pulmonary:      Comments: Lungs clear to auscultation  Abdominal:      General: Bowel sounds are normal. There is no distension.      Palpations: Abdomen is soft.      Tenderness: There is no abdominal tenderness.      Comments: Well-healed midline incision below umbilicus and laparoscopic sites. No palpable masses or organomegaly.   Musculoskeletal:         General: No swelling or tenderness. Normal range of motion.      Cervical back: Neck supple. No tenderness.   Skin:     General: Skin is warm and dry.      Findings: No rash.   Neurological:      General: No focal deficit present.      Mental Status: She is alert and oriented to person, place, and time.      Cranial Nerves: No cranial nerve deficit.      Motor: No weakness.       ECOG SCORE    0 - Fully active-able to carry on all pre-disease performance without restriction          LABORATORY  Recent Results (from the past week)   Protein,UA (Dipstick)    Collection Time: 11/13/24  8:30 AM   Result Value Ref Range    Protein, UA Negative Negative   CEA    Collection Time: 11/13/24  8:30 AM   Result Value Ref Range    Carcinoembryonic Antigen 11.79 (H) 0.00 - 3.00 ng/mL   Comprehensive Metabolic Panel    Collection Time: 11/13/24  8:30 AM   Result Value Ref Range    Sodium 139 136 - 145 mmol/L    Potassium 4.3 3.5 - 5.1 mmol/L    Chloride 104 98 - 107 mmol/L    CO2 26 23 - 31 mmol/L    Glucose 73 (L) 82 - 115 mg/dL    Blood Urea Nitrogen 15.1 9.8 - 20.1 mg/dL    Creatinine 0.70 0.55 - 1.02 mg/dL    Calcium 9.9 8.4 - 10.2 mg/dL    Protein Total 7.2 5.8 - 7.6 gm/dL    Albumin 3.5 3.4 - 4.8 g/dL    Globulin 3.7 (H) 2.4 - 3.5 gm/dL    Albumin/Globulin Ratio 0.9 (L) 1.1 - 2.0 ratio    Bilirubin Total 0.5 <=1.5 mg/dL     40 - 150 unit/L    ALT 13 0 - 55 unit/L    AST 18 5 - 34 unit/L    eGFR >60 mL/min/1.73/m2    Anion Gap  9.0 mEq/L    BUN/Creatinine Ratio 22    CBC with Differential    Collection Time: 11/13/24  8:30 AM   Result Value Ref Range    WBC 5.39 4.50 - 11.50 x10(3)/mcL    RBC 4.29 4.20 - 5.40 x10(6)/mcL    Hgb 12.9 12.0 - 16.0 g/dL    Hct 40.2 37.0 - 47.0 %    MCV 93.7 80.0 - 94.0 fL    MCH 30.1 27.0 - 31.0 pg    MCHC 32.1 (L) 33.0 - 36.0 g/dL    RDW 18.9 (H) 11.5 - 17.0 %    Platelet 369 130 - 400 x10(3)/mcL    MPV 7.9 7.4 - 10.4 fL    Neut % 39.5 %    Lymph % 40.6 %    Mono % 13.9 %    Eos % 4.5 %    Basophil % 0.9 %    Lymph # 2.19 0.6 - 4.6 x10(3)/mcL    Neut # 2.13 2.1 - 9.2 x10(3)/mcL    Mono # 0.75 0.1 - 1.3 x10(3)/mcL    Eos # 0.24 0 - 0.9 x10(3)/mcL    Baso # 0.05 <=0.2 x10(3)/mcL    IG# 0.03 0 - 0.04 x10(3)/mcL    IG% 0.6 %   Protein,UA (Dipstick)    Collection Time: 11/19/24  2:18 PM   Result Value Ref Range    Protein, UA Trace (A) Negative   CBC with Differential    Collection Time: 11/19/24  2:18 PM   Result Value Ref Range    WBC 3.69 (L) 4.50 - 11.50 x10(3)/mcL    RBC 3.98 (L) 4.20 - 5.40 x10(6)/mcL    Hgb 12.3 12.0 - 16.0 g/dL    Hct 37.2 37.0 - 47.0 %    MCV 93.5 80.0 - 94.0 fL    MCH 30.9 27.0 - 31.0 pg    MCHC 33.1 33.0 - 36.0 g/dL    RDW 16.9 11.5 - 17.0 %    Platelet 358 130 - 400 x10(3)/mcL    MPV 8.2 7.4 - 10.4 fL   Manual Differential    Collection Time: 11/19/24  2:18 PM   Result Value Ref Range    Neutrophils % 37 (L) 47 - 80 %    Lymphs % 47 (H) 13 - 40 %    Monocytes % 10 2 - 11 %    Eosinophils % 5 0 - 8 %    Basophils % 2 0 - 2 %    Neutrophils Abs Calc 1.3653 (L) 2.1 - 9.2 x10(3)/mcL    Basophils Abs 0.0738 0 - 0.2 x10(3)/mcL    Lymphs Abs 1.7343 0.6 - 4.6 x10(3)/mcL    Eosinophils Abs 0.1845 0 - 0.9 x10(3)/mcL    Monocytes Abs 0.369 0.1 - 1.3 x10(3)/mcL    Platelets Normal Normal, Adequate    RBC Morph Normal Normal                    8/26/24 9/18/24 9/23/24 11/13/24  CEA        23.9        17.1        16.0         11.8    Assessment:   Metastatic rectal cancer      Plan:   She will  proceed with her 9th cycle of therapy next week on 11/27/24 at full dose.  She will be scheduled for a follow-up CT-PET scan in approximately 8 weeks after 2 additional cycles of therapy.    RTC in 4 weeks after the PET scan for a follow-up visit and results review.      TIM HARDY MD     Other Physicians  Dr. Jaimee Sanches

## 2024-11-11 RX ORDER — FLUOROURACIL 50 MG/ML
400 INJECTION, SOLUTION INTRAVENOUS
Status: CANCELLED | OUTPATIENT
Start: 2024-11-13

## 2024-11-11 RX ORDER — SODIUM CHLORIDE 0.9 % (FLUSH) 0.9 %
10 SYRINGE (ML) INJECTION
Status: CANCELLED | OUTPATIENT
Start: 2024-11-15

## 2024-11-11 RX ORDER — EPINEPHRINE 0.3 MG/.3ML
0.3 INJECTION SUBCUTANEOUS ONCE AS NEEDED
Status: CANCELLED | OUTPATIENT
Start: 2024-11-13

## 2024-11-11 RX ORDER — PROCHLORPERAZINE EDISYLATE 5 MG/ML
10 INJECTION INTRAMUSCULAR; INTRAVENOUS ONCE AS NEEDED
Status: CANCELLED | OUTPATIENT
Start: 2024-11-13

## 2024-11-11 RX ORDER — PROCHLORPERAZINE EDISYLATE 5 MG/ML
10 INJECTION INTRAMUSCULAR; INTRAVENOUS ONCE AS NEEDED
Status: CANCELLED | OUTPATIENT
Start: 2024-11-15

## 2024-11-11 RX ORDER — SODIUM CHLORIDE 0.9 % (FLUSH) 0.9 %
10 SYRINGE (ML) INJECTION
Status: CANCELLED | OUTPATIENT
Start: 2024-11-13

## 2024-11-11 RX ORDER — ATROPINE SULFATE 0.4 MG/ML
0.4 INJECTION, SOLUTION ENDOTRACHEAL; INTRAMEDULLARY; INTRAMUSCULAR; INTRAVENOUS; SUBCUTANEOUS
Status: CANCELLED
Start: 2024-11-13

## 2024-11-11 RX ORDER — HEPARIN 100 UNIT/ML
500 SYRINGE INTRAVENOUS
Status: CANCELLED | OUTPATIENT
Start: 2024-11-15

## 2024-11-11 RX ORDER — HEPARIN 100 UNIT/ML
500 SYRINGE INTRAVENOUS
Status: CANCELLED | OUTPATIENT
Start: 2024-11-13

## 2024-11-11 RX ORDER — DIPHENHYDRAMINE HYDROCHLORIDE 50 MG/ML
50 INJECTION INTRAMUSCULAR; INTRAVENOUS ONCE AS NEEDED
Status: CANCELLED | OUTPATIENT
Start: 2024-11-13

## 2024-11-13 ENCOUNTER — LAB VISIT (OUTPATIENT)
Dept: LAB | Facility: HOSPITAL | Age: 59
End: 2024-11-13
Attending: NURSE PRACTITIONER
Payer: COMMERCIAL

## 2024-11-13 ENCOUNTER — INFUSION (OUTPATIENT)
Dept: INFUSION THERAPY | Facility: HOSPITAL | Age: 59
End: 2024-11-13
Attending: INTERNAL MEDICINE
Payer: COMMERCIAL

## 2024-11-13 VITALS
TEMPERATURE: 98 F | BODY MASS INDEX: 27.49 KG/M2 | OXYGEN SATURATION: 96 % | WEIGHT: 175.19 LBS | SYSTOLIC BLOOD PRESSURE: 109 MMHG | HEART RATE: 56 BPM | DIASTOLIC BLOOD PRESSURE: 72 MMHG | RESPIRATION RATE: 18 BRPM | HEIGHT: 67 IN

## 2024-11-13 DIAGNOSIS — C78.02 SECONDARY MALIGNANCY OF LEFT LUNG: ICD-10-CM

## 2024-11-13 DIAGNOSIS — C78.01 MALIGNANT NEOPLASM METASTATIC TO RIGHT LUNG: ICD-10-CM

## 2024-11-13 DIAGNOSIS — Z13.89 SCREENING FOR HEMATURIA OR PROTEINURIA: ICD-10-CM

## 2024-11-13 DIAGNOSIS — C20 RECTAL CANCER: ICD-10-CM

## 2024-11-13 DIAGNOSIS — C78.01 MALIGNANT NEOPLASM METASTATIC TO RIGHT LUNG: Primary | ICD-10-CM

## 2024-11-13 LAB
ALBUMIN SERPL-MCNC: 3.5 G/DL (ref 3.4–4.8)
ALBUMIN/GLOB SERPL: 0.9 RATIO (ref 1.1–2)
ALP SERPL-CCNC: 135 UNIT/L (ref 40–150)
ALT SERPL-CCNC: 13 UNIT/L (ref 0–55)
ANION GAP SERPL CALC-SCNC: 9 MEQ/L
AST SERPL-CCNC: 18 UNIT/L (ref 5–34)
BASOPHILS # BLD AUTO: 0.05 X10(3)/MCL
BASOPHILS NFR BLD AUTO: 0.9 %
BILIRUB SERPL-MCNC: 0.5 MG/DL
BUN SERPL-MCNC: 15.1 MG/DL (ref 9.8–20.1)
CALCIUM SERPL-MCNC: 9.9 MG/DL (ref 8.4–10.2)
CEA SERPL-MCNC: 11.79 NG/ML (ref 0–3)
CHLORIDE SERPL-SCNC: 104 MMOL/L (ref 98–107)
CO2 SERPL-SCNC: 26 MMOL/L (ref 23–31)
CREAT SERPL-MCNC: 0.7 MG/DL (ref 0.55–1.02)
CREAT/UREA NIT SERPL: 22
EOSINOPHIL # BLD AUTO: 0.24 X10(3)/MCL (ref 0–0.9)
EOSINOPHIL NFR BLD AUTO: 4.5 %
ERYTHROCYTE [DISTWIDTH] IN BLOOD BY AUTOMATED COUNT: 18.9 % (ref 11.5–17)
GFR SERPLBLD CREATININE-BSD FMLA CKD-EPI: >60 ML/MIN/1.73/M2
GLOBULIN SER-MCNC: 3.7 GM/DL (ref 2.4–3.5)
GLUCOSE SERPL-MCNC: 73 MG/DL (ref 82–115)
HCT VFR BLD AUTO: 40.2 % (ref 37–47)
HGB BLD-MCNC: 12.9 G/DL (ref 12–16)
IMM GRANULOCYTES # BLD AUTO: 0.03 X10(3)/MCL (ref 0–0.04)
IMM GRANULOCYTES NFR BLD AUTO: 0.6 %
LYMPHOCYTES # BLD AUTO: 2.19 X10(3)/MCL (ref 0.6–4.6)
LYMPHOCYTES NFR BLD AUTO: 40.6 %
MCH RBC QN AUTO: 30.1 PG (ref 27–31)
MCHC RBC AUTO-ENTMCNC: 32.1 G/DL (ref 33–36)
MCV RBC AUTO: 93.7 FL (ref 80–94)
MONOCYTES # BLD AUTO: 0.75 X10(3)/MCL (ref 0.1–1.3)
MONOCYTES NFR BLD AUTO: 13.9 %
NEUTROPHILS # BLD AUTO: 2.13 X10(3)/MCL (ref 2.1–9.2)
NEUTROPHILS NFR BLD AUTO: 39.5 %
PLATELET # BLD AUTO: 369 X10(3)/MCL (ref 130–400)
PMV BLD AUTO: 7.9 FL (ref 7.4–10.4)
POTASSIUM SERPL-SCNC: 4.3 MMOL/L (ref 3.5–5.1)
PROT SERPL-MCNC: 7.2 GM/DL (ref 5.8–7.6)
PROT UR QL STRIP: NEGATIVE
RBC # BLD AUTO: 4.29 X10(6)/MCL (ref 4.2–5.4)
SODIUM SERPL-SCNC: 139 MMOL/L (ref 136–145)
WBC # BLD AUTO: 5.39 X10(3)/MCL (ref 4.5–11.5)

## 2024-11-13 PROCEDURE — 96411 CHEMO IV PUSH ADDL DRUG: CPT

## 2024-11-13 PROCEDURE — 96413 CHEMO IV INFUSION 1 HR: CPT

## 2024-11-13 PROCEDURE — 96367 TX/PROPH/DG ADDL SEQ IV INF: CPT

## 2024-11-13 PROCEDURE — 81005 URINALYSIS: CPT

## 2024-11-13 PROCEDURE — 82378 CARCINOEMBRYONIC ANTIGEN: CPT

## 2024-11-13 PROCEDURE — 63600175 PHARM REV CODE 636 W HCPCS: Performed by: INTERNAL MEDICINE

## 2024-11-13 PROCEDURE — 96368 THER/DIAG CONCURRENT INF: CPT

## 2024-11-13 PROCEDURE — 80053 COMPREHEN METABOLIC PANEL: CPT

## 2024-11-13 PROCEDURE — 25000003 PHARM REV CODE 250: Performed by: INTERNAL MEDICINE

## 2024-11-13 PROCEDURE — 36415 COLL VENOUS BLD VENIPUNCTURE: CPT

## 2024-11-13 PROCEDURE — 96417 CHEMO IV INFUS EACH ADDL SEQ: CPT

## 2024-11-13 PROCEDURE — 96375 TX/PRO/DX INJ NEW DRUG ADDON: CPT

## 2024-11-13 PROCEDURE — 96416 CHEMO PROLONG INFUSE W/PUMP: CPT

## 2024-11-13 PROCEDURE — 85025 COMPLETE CBC W/AUTO DIFF WBC: CPT

## 2024-11-13 RX ORDER — SODIUM CHLORIDE 0.9 % (FLUSH) 0.9 %
10 SYRINGE (ML) INJECTION
Status: DISCONTINUED | OUTPATIENT
Start: 2024-11-13 | End: 2024-11-13 | Stop reason: HOSPADM

## 2024-11-13 RX ORDER — FLUOROURACIL 50 MG/ML
400 INJECTION, SOLUTION INTRAVENOUS
Status: COMPLETED | OUTPATIENT
Start: 2024-11-13 | End: 2024-11-13

## 2024-11-13 RX ORDER — PROCHLORPERAZINE EDISYLATE 5 MG/ML
10 INJECTION INTRAMUSCULAR; INTRAVENOUS ONCE AS NEEDED
Status: DISCONTINUED | OUTPATIENT
Start: 2024-11-13 | End: 2024-11-13 | Stop reason: HOSPADM

## 2024-11-13 RX ORDER — ATROPINE SULFATE 0.4 MG/ML
0.4 INJECTION, SOLUTION ENDOTRACHEAL; INTRAMEDULLARY; INTRAMUSCULAR; INTRAVENOUS; SUBCUTANEOUS
Status: COMPLETED | OUTPATIENT
Start: 2024-11-13 | End: 2024-11-13

## 2024-11-13 RX ORDER — DIPHENHYDRAMINE HYDROCHLORIDE 50 MG/ML
50 INJECTION INTRAMUSCULAR; INTRAVENOUS ONCE AS NEEDED
Status: DISCONTINUED | OUTPATIENT
Start: 2024-11-13 | End: 2024-11-13 | Stop reason: HOSPADM

## 2024-11-13 RX ORDER — HEPARIN 100 UNIT/ML
500 SYRINGE INTRAVENOUS
Status: DISCONTINUED | OUTPATIENT
Start: 2024-11-13 | End: 2024-11-13 | Stop reason: HOSPADM

## 2024-11-13 RX ORDER — EPINEPHRINE 0.3 MG/.3ML
0.3 INJECTION SUBCUTANEOUS ONCE AS NEEDED
Status: DISCONTINUED | OUTPATIENT
Start: 2024-11-13 | End: 2024-11-13 | Stop reason: HOSPADM

## 2024-11-13 RX ADMIN — ATROPINE SULFATE 0.4 MG: 0.4 INJECTION, SOLUTION INTRAVENOUS at 11:11

## 2024-11-13 RX ADMIN — SODIUM CHLORIDE 0.25 MG: 9 INJECTION, SOLUTION INTRAVENOUS at 10:11

## 2024-11-13 RX ADMIN — BEVACIZUMAB-AWWB 400 MG: 400 INJECTION, SOLUTION INTRAVENOUS at 10:11

## 2024-11-13 RX ADMIN — FLUOROURACIL 775 MG: 50 INJECTION, SOLUTION INTRAVENOUS at 01:11

## 2024-11-13 RX ADMIN — FLUOROURACIL 4500 MG: 50 INJECTION, SOLUTION INTRAVENOUS at 01:11

## 2024-11-13 RX ADMIN — LEUCOVORIN CALCIUM 750 MG: 350 INJECTION, POWDER, LYOPHILIZED, FOR SOLUTION INTRAMUSCULAR; INTRAVENOUS at 11:11

## 2024-11-13 RX ADMIN — IRINOTECAN HYDROCHLORIDE 340 MG: 20 INJECTION, SOLUTION INTRAVENOUS at 11:11

## 2024-11-13 NOTE — PLAN OF CARE
Patient received C8D1, tolerated well. CADD pump connected infusing 5FU. Will return Friday at 11 am.

## 2024-11-15 ENCOUNTER — INFUSION (OUTPATIENT)
Dept: INFUSION THERAPY | Facility: HOSPITAL | Age: 59
End: 2024-11-15
Attending: INTERNAL MEDICINE
Payer: COMMERCIAL

## 2024-11-15 VITALS
RESPIRATION RATE: 18 BRPM | TEMPERATURE: 98 F | HEART RATE: 61 BPM | OXYGEN SATURATION: 97 % | DIASTOLIC BLOOD PRESSURE: 76 MMHG | SYSTOLIC BLOOD PRESSURE: 119 MMHG

## 2024-11-15 DIAGNOSIS — C78.01 MALIGNANT NEOPLASM METASTATIC TO RIGHT LUNG: Primary | ICD-10-CM

## 2024-11-15 DIAGNOSIS — C78.02 SECONDARY MALIGNANCY OF LEFT LUNG: ICD-10-CM

## 2024-11-15 PROCEDURE — A4216 STERILE WATER/SALINE, 10 ML: HCPCS | Performed by: INTERNAL MEDICINE

## 2024-11-15 PROCEDURE — 25000003 PHARM REV CODE 250: Performed by: INTERNAL MEDICINE

## 2024-11-15 PROCEDURE — 63600175 PHARM REV CODE 636 W HCPCS: Performed by: INTERNAL MEDICINE

## 2024-11-15 RX ORDER — HEPARIN 100 UNIT/ML
500 SYRINGE INTRAVENOUS
Status: DISCONTINUED | OUTPATIENT
Start: 2024-11-15 | End: 2024-11-15 | Stop reason: HOSPADM

## 2024-11-15 RX ORDER — SODIUM CHLORIDE 0.9 % (FLUSH) 0.9 %
10 SYRINGE (ML) INJECTION
Status: DISCONTINUED | OUTPATIENT
Start: 2024-11-15 | End: 2024-11-15 | Stop reason: HOSPADM

## 2024-11-15 RX ORDER — PROCHLORPERAZINE EDISYLATE 5 MG/ML
10 INJECTION INTRAMUSCULAR; INTRAVENOUS ONCE AS NEEDED
Status: DISCONTINUED | OUTPATIENT
Start: 2024-11-15 | End: 2024-11-15 | Stop reason: HOSPADM

## 2024-11-15 RX ADMIN — HEPARIN 500 UNITS: 100 SYRINGE at 11:11

## 2024-11-15 RX ADMIN — Medication 10 ML: at 11:11

## 2024-11-19 ENCOUNTER — LAB VISIT (OUTPATIENT)
Dept: LAB | Facility: HOSPITAL | Age: 59
End: 2024-11-19
Attending: INTERNAL MEDICINE
Payer: COMMERCIAL

## 2024-11-19 ENCOUNTER — OFFICE VISIT (OUTPATIENT)
Dept: HEMATOLOGY/ONCOLOGY | Facility: CLINIC | Age: 59
End: 2024-11-19
Payer: COMMERCIAL

## 2024-11-19 VITALS
WEIGHT: 174 LBS | TEMPERATURE: 98 F | RESPIRATION RATE: 15 BRPM | SYSTOLIC BLOOD PRESSURE: 115 MMHG | BODY MASS INDEX: 27.31 KG/M2 | OXYGEN SATURATION: 98 % | DIASTOLIC BLOOD PRESSURE: 69 MMHG | HEART RATE: 85 BPM | HEIGHT: 67 IN

## 2024-11-19 DIAGNOSIS — C78.01 MALIGNANT NEOPLASM METASTATIC TO RIGHT LUNG: ICD-10-CM

## 2024-11-19 DIAGNOSIS — C78.7 SECONDARY MALIGNANT NEOPLASM OF LIVER: ICD-10-CM

## 2024-11-19 DIAGNOSIS — Z13.89 SCREENING FOR HEMATURIA OR PROTEINURIA: ICD-10-CM

## 2024-11-19 DIAGNOSIS — C20 RECTAL CANCER: ICD-10-CM

## 2024-11-19 DIAGNOSIS — C20 RECTAL CANCER: Primary | ICD-10-CM

## 2024-11-19 LAB
ABS NEUT CALC (OHS): 1.37 X10(3)/MCL (ref 2.1–9.2)
ALBUMIN SERPL-MCNC: 3.4 G/DL (ref 3.4–4.8)
ALBUMIN/GLOB SERPL: 0.9 RATIO (ref 1.1–2)
ALP SERPL-CCNC: 135 UNIT/L (ref 40–150)
ALT SERPL-CCNC: 15 UNIT/L (ref 0–55)
ANION GAP SERPL CALC-SCNC: 10 MEQ/L
AST SERPL-CCNC: 18 UNIT/L (ref 5–34)
BASOPHILS NFR BLD MANUAL: 0.07 X10(3)/MCL (ref 0–0.2)
BASOPHILS NFR BLD MANUAL: 2 % (ref 0–2)
BILIRUB SERPL-MCNC: 0.5 MG/DL
BUN SERPL-MCNC: 13.9 MG/DL (ref 9.8–20.1)
CALCIUM SERPL-MCNC: 9.4 MG/DL (ref 8.4–10.2)
CEA SERPL-MCNC: 12.24 NG/ML (ref 0–3)
CHLORIDE SERPL-SCNC: 102 MMOL/L (ref 98–107)
CO2 SERPL-SCNC: 26 MMOL/L (ref 23–31)
CREAT SERPL-MCNC: 0.75 MG/DL (ref 0.55–1.02)
CREAT/UREA NIT SERPL: 19
EOSINOPHIL NFR BLD MANUAL: 0.18 X10(3)/MCL (ref 0–0.9)
EOSINOPHIL NFR BLD MANUAL: 5 % (ref 0–8)
ERYTHROCYTE [DISTWIDTH] IN BLOOD BY AUTOMATED COUNT: 16.9 % (ref 11.5–17)
GFR SERPLBLD CREATININE-BSD FMLA CKD-EPI: >60 ML/MIN/1.73/M2
GLOBULIN SER-MCNC: 3.7 GM/DL (ref 2.4–3.5)
GLUCOSE SERPL-MCNC: 180 MG/DL (ref 82–115)
HCT VFR BLD AUTO: 37.2 % (ref 37–47)
HGB BLD-MCNC: 12.3 G/DL (ref 12–16)
LYMPHOCYTES NFR BLD MANUAL: 1.73 X10(3)/MCL
LYMPHOCYTES NFR BLD MANUAL: 47 % (ref 13–40)
MCH RBC QN AUTO: 30.9 PG (ref 27–31)
MCHC RBC AUTO-ENTMCNC: 33.1 G/DL (ref 33–36)
MCV RBC AUTO: 93.5 FL (ref 80–94)
MONOCYTES NFR BLD MANUAL: 0.37 X10(3)/MCL (ref 0.1–1.3)
MONOCYTES NFR BLD MANUAL: 10 % (ref 2–11)
NEUTROPHILS NFR BLD MANUAL: 37 % (ref 47–80)
PLATELET # BLD AUTO: 358 X10(3)/MCL (ref 130–400)
PLATELET # BLD EST: NORMAL 10*3/UL
PMV BLD AUTO: 8.2 FL (ref 7.4–10.4)
POTASSIUM SERPL-SCNC: 3.9 MMOL/L (ref 3.5–5.1)
PROT SERPL-MCNC: 7.1 GM/DL (ref 5.8–7.6)
PROT UR QL STRIP: ABNORMAL
RBC # BLD AUTO: 3.98 X10(6)/MCL (ref 4.2–5.4)
RBC MORPH BLD: NORMAL
SODIUM SERPL-SCNC: 138 MMOL/L (ref 136–145)
WBC # BLD AUTO: 3.69 X10(3)/MCL (ref 4.5–11.5)

## 2024-11-19 PROCEDURE — 3074F SYST BP LT 130 MM HG: CPT | Mod: CPTII,S$GLB,, | Performed by: INTERNAL MEDICINE

## 2024-11-19 PROCEDURE — 99214 OFFICE O/P EST MOD 30 MIN: CPT | Mod: S$GLB,,, | Performed by: INTERNAL MEDICINE

## 2024-11-19 PROCEDURE — 3008F BODY MASS INDEX DOCD: CPT | Mod: CPTII,S$GLB,, | Performed by: INTERNAL MEDICINE

## 2024-11-19 PROCEDURE — 80053 COMPREHEN METABOLIC PANEL: CPT

## 2024-11-19 PROCEDURE — 81005 URINALYSIS: CPT

## 2024-11-19 PROCEDURE — 3044F HG A1C LEVEL LT 7.0%: CPT | Mod: CPTII,S$GLB,, | Performed by: INTERNAL MEDICINE

## 2024-11-19 PROCEDURE — 82378 CARCINOEMBRYONIC ANTIGEN: CPT

## 2024-11-19 PROCEDURE — 36415 COLL VENOUS BLD VENIPUNCTURE: CPT

## 2024-11-19 PROCEDURE — 1160F RVW MEDS BY RX/DR IN RCRD: CPT | Mod: CPTII,S$GLB,, | Performed by: INTERNAL MEDICINE

## 2024-11-19 PROCEDURE — 1159F MED LIST DOCD IN RCRD: CPT | Mod: CPTII,S$GLB,, | Performed by: INTERNAL MEDICINE

## 2024-11-19 PROCEDURE — 85025 COMPLETE CBC W/AUTO DIFF WBC: CPT

## 2024-11-19 PROCEDURE — 99999 PR PBB SHADOW E&M-EST. PATIENT-LVL IV: CPT | Mod: PBBFAC,,, | Performed by: INTERNAL MEDICINE

## 2024-11-19 PROCEDURE — 3078F DIAST BP <80 MM HG: CPT | Mod: CPTII,S$GLB,, | Performed by: INTERNAL MEDICINE

## 2024-11-24 RX ORDER — PROCHLORPERAZINE EDISYLATE 5 MG/ML
10 INJECTION INTRAMUSCULAR; INTRAVENOUS ONCE AS NEEDED
Status: CANCELLED | OUTPATIENT
Start: 2024-11-29

## 2024-11-24 RX ORDER — SODIUM CHLORIDE 0.9 % (FLUSH) 0.9 %
10 SYRINGE (ML) INJECTION
Status: CANCELLED | OUTPATIENT
Start: 2024-11-29

## 2024-11-24 RX ORDER — HEPARIN 100 UNIT/ML
500 SYRINGE INTRAVENOUS
Status: CANCELLED | OUTPATIENT
Start: 2024-11-27

## 2024-11-24 RX ORDER — FLUOROURACIL 50 MG/ML
400 INJECTION, SOLUTION INTRAVENOUS
Status: CANCELLED | OUTPATIENT
Start: 2024-11-27

## 2024-11-24 RX ORDER — HEPARIN 100 UNIT/ML
500 SYRINGE INTRAVENOUS
Status: CANCELLED | OUTPATIENT
Start: 2024-11-29

## 2024-11-24 RX ORDER — SODIUM CHLORIDE 0.9 % (FLUSH) 0.9 %
10 SYRINGE (ML) INJECTION
Status: CANCELLED | OUTPATIENT
Start: 2024-11-27

## 2024-11-24 RX ORDER — ATROPINE SULFATE 0.4 MG/ML
0.4 INJECTION, SOLUTION ENDOTRACHEAL; INTRAMEDULLARY; INTRAMUSCULAR; INTRAVENOUS; SUBCUTANEOUS
Status: CANCELLED
Start: 2024-11-27

## 2024-11-24 RX ORDER — DIPHENHYDRAMINE HYDROCHLORIDE 50 MG/ML
50 INJECTION INTRAMUSCULAR; INTRAVENOUS ONCE AS NEEDED
Status: CANCELLED | OUTPATIENT
Start: 2024-11-27

## 2024-11-24 RX ORDER — PROCHLORPERAZINE EDISYLATE 5 MG/ML
10 INJECTION INTRAMUSCULAR; INTRAVENOUS ONCE AS NEEDED
Status: CANCELLED | OUTPATIENT
Start: 2024-11-27

## 2024-11-24 RX ORDER — EPINEPHRINE 0.3 MG/.3ML
0.3 INJECTION SUBCUTANEOUS ONCE AS NEEDED
Status: CANCELLED | OUTPATIENT
Start: 2024-11-27

## 2024-11-27 ENCOUNTER — INFUSION (OUTPATIENT)
Dept: INFUSION THERAPY | Facility: HOSPITAL | Age: 59
End: 2024-11-27
Attending: INTERNAL MEDICINE
Payer: COMMERCIAL

## 2024-11-27 VITALS
HEART RATE: 81 BPM | OXYGEN SATURATION: 100 % | DIASTOLIC BLOOD PRESSURE: 58 MMHG | TEMPERATURE: 97 F | SYSTOLIC BLOOD PRESSURE: 108 MMHG

## 2024-11-27 DIAGNOSIS — C78.01 MALIGNANT NEOPLASM METASTATIC TO RIGHT LUNG: Primary | ICD-10-CM

## 2024-11-27 DIAGNOSIS — C78.02 SECONDARY MALIGNANCY OF LEFT LUNG: ICD-10-CM

## 2024-11-27 PROCEDURE — 96417 CHEMO IV INFUS EACH ADDL SEQ: CPT

## 2024-11-27 PROCEDURE — 96413 CHEMO IV INFUSION 1 HR: CPT

## 2024-11-27 PROCEDURE — 96368 THER/DIAG CONCURRENT INF: CPT

## 2024-11-27 PROCEDURE — 25000003 PHARM REV CODE 250: Performed by: INTERNAL MEDICINE

## 2024-11-27 PROCEDURE — 96416 CHEMO PROLONG INFUSE W/PUMP: CPT

## 2024-11-27 PROCEDURE — 96367 TX/PROPH/DG ADDL SEQ IV INF: CPT

## 2024-11-27 PROCEDURE — 96375 TX/PRO/DX INJ NEW DRUG ADDON: CPT

## 2024-11-27 PROCEDURE — 96411 CHEMO IV PUSH ADDL DRUG: CPT

## 2024-11-27 PROCEDURE — 63600175 PHARM REV CODE 636 W HCPCS: Performed by: INTERNAL MEDICINE

## 2024-11-27 RX ORDER — PROCHLORPERAZINE EDISYLATE 5 MG/ML
10 INJECTION INTRAMUSCULAR; INTRAVENOUS ONCE AS NEEDED
Status: DISCONTINUED | OUTPATIENT
Start: 2024-11-27 | End: 2024-11-27 | Stop reason: HOSPADM

## 2024-11-27 RX ORDER — SODIUM CHLORIDE 0.9 % (FLUSH) 0.9 %
10 SYRINGE (ML) INJECTION
Status: DISCONTINUED | OUTPATIENT
Start: 2024-11-27 | End: 2024-11-27 | Stop reason: HOSPADM

## 2024-11-27 RX ORDER — HEPARIN 100 UNIT/ML
500 SYRINGE INTRAVENOUS
Status: DISCONTINUED | OUTPATIENT
Start: 2024-11-27 | End: 2024-11-27 | Stop reason: HOSPADM

## 2024-11-27 RX ORDER — EPINEPHRINE 0.3 MG/.3ML
0.3 INJECTION SUBCUTANEOUS ONCE AS NEEDED
Status: DISCONTINUED | OUTPATIENT
Start: 2024-11-27 | End: 2024-11-27 | Stop reason: HOSPADM

## 2024-11-27 RX ORDER — DIPHENHYDRAMINE HYDROCHLORIDE 50 MG/ML
50 INJECTION INTRAMUSCULAR; INTRAVENOUS ONCE AS NEEDED
Status: DISCONTINUED | OUTPATIENT
Start: 2024-11-27 | End: 2024-11-27 | Stop reason: HOSPADM

## 2024-11-27 RX ORDER — FLUOROURACIL 50 MG/ML
400 INJECTION, SOLUTION INTRAVENOUS
Status: COMPLETED | OUTPATIENT
Start: 2024-11-27 | End: 2024-11-27

## 2024-11-27 RX ORDER — ATROPINE SULFATE 0.4 MG/ML
0.4 INJECTION, SOLUTION ENDOTRACHEAL; INTRAMEDULLARY; INTRAMUSCULAR; INTRAVENOUS; SUBCUTANEOUS
Status: COMPLETED | OUTPATIENT
Start: 2024-11-27 | End: 2024-11-27

## 2024-11-27 RX ADMIN — DEXAMETHASONE SODIUM PHOSPHATE 0.25 MG: 4 INJECTION, SOLUTION INTRA-ARTICULAR; INTRALESIONAL; INTRAMUSCULAR; INTRAVENOUS; SOFT TISSUE at 11:11

## 2024-11-27 RX ADMIN — ATROPINE SULFATE 0.4 MG: 0.4 INJECTION, SOLUTION INTRAVENOUS at 11:11

## 2024-11-27 RX ADMIN — BEVACIZUMAB-AWWB 400 MG: 400 INJECTION, SOLUTION INTRAVENOUS at 10:11

## 2024-11-27 RX ADMIN — IRINOTECAN HYDROCHLORIDE 340 MG: 20 INJECTION, SOLUTION INTRAVENOUS at 11:11

## 2024-11-27 RX ADMIN — LEUCOVORIN CALCIUM 750 MG: 350 INJECTION, POWDER, LYOPHILIZED, FOR SOLUTION INTRAMUSCULAR; INTRAVENOUS at 11:11

## 2024-11-27 RX ADMIN — FLUOROURACIL 4500 MG: 50 INJECTION, SOLUTION INTRAVENOUS at 01:11

## 2024-11-27 RX ADMIN — FLUOROURACIL 775 MG: 50 INJECTION, SOLUTION INTRAVENOUS at 01:11

## 2024-11-29 ENCOUNTER — INFUSION (OUTPATIENT)
Dept: INFUSION THERAPY | Facility: HOSPITAL | Age: 59
End: 2024-11-29
Attending: INTERNAL MEDICINE
Payer: COMMERCIAL

## 2024-11-29 VITALS
HEIGHT: 67 IN | OXYGEN SATURATION: 98 % | BODY MASS INDEX: 27.31 KG/M2 | SYSTOLIC BLOOD PRESSURE: 119 MMHG | RESPIRATION RATE: 18 BRPM | WEIGHT: 174 LBS | HEART RATE: 56 BPM | DIASTOLIC BLOOD PRESSURE: 76 MMHG | TEMPERATURE: 98 F

## 2024-11-29 DIAGNOSIS — C78.01 MALIGNANT NEOPLASM METASTATIC TO RIGHT LUNG: Primary | ICD-10-CM

## 2024-11-29 DIAGNOSIS — C78.02 SECONDARY MALIGNANCY OF LEFT LUNG: ICD-10-CM

## 2024-11-29 PROCEDURE — 63600175 PHARM REV CODE 636 W HCPCS: Performed by: INTERNAL MEDICINE

## 2024-11-29 PROCEDURE — A4216 STERILE WATER/SALINE, 10 ML: HCPCS | Performed by: INTERNAL MEDICINE

## 2024-11-29 PROCEDURE — 25000003 PHARM REV CODE 250: Performed by: INTERNAL MEDICINE

## 2024-11-29 RX ORDER — PROCHLORPERAZINE EDISYLATE 5 MG/ML
10 INJECTION INTRAMUSCULAR; INTRAVENOUS ONCE AS NEEDED
Status: DISCONTINUED | OUTPATIENT
Start: 2024-11-29 | End: 2024-11-29 | Stop reason: HOSPADM

## 2024-11-29 RX ORDER — HEPARIN 100 UNIT/ML
500 SYRINGE INTRAVENOUS
Status: DISCONTINUED | OUTPATIENT
Start: 2024-11-29 | End: 2024-11-29 | Stop reason: HOSPADM

## 2024-11-29 RX ORDER — SODIUM CHLORIDE 0.9 % (FLUSH) 0.9 %
10 SYRINGE (ML) INJECTION
Status: DISCONTINUED | OUTPATIENT
Start: 2024-11-29 | End: 2024-11-29 | Stop reason: HOSPADM

## 2024-11-29 RX ADMIN — HEPARIN 500 UNITS: 100 SYRINGE at 11:11

## 2024-11-29 RX ADMIN — Medication 10 ML: at 11:11

## 2024-11-29 NOTE — PLAN OF CARE
Plan of care reviewed with patient; patient in agreement. C9D3 completed. Appts reviewed with patient; patient uses portal.

## 2024-12-02 ENCOUNTER — PATIENT MESSAGE (OUTPATIENT)
Dept: HEMATOLOGY/ONCOLOGY | Facility: CLINIC | Age: 59
End: 2024-12-02
Payer: COMMERCIAL

## 2024-12-08 RX ORDER — EPINEPHRINE 0.3 MG/.3ML
0.3 INJECTION SUBCUTANEOUS ONCE AS NEEDED
Status: CANCELLED | OUTPATIENT
Start: 2024-12-11

## 2024-12-08 RX ORDER — FLUOROURACIL 50 MG/ML
400 INJECTION, SOLUTION INTRAVENOUS
Status: CANCELLED | OUTPATIENT
Start: 2024-12-11

## 2024-12-08 RX ORDER — PROCHLORPERAZINE EDISYLATE 5 MG/ML
10 INJECTION INTRAMUSCULAR; INTRAVENOUS ONCE AS NEEDED
Status: CANCELLED | OUTPATIENT
Start: 2024-12-13

## 2024-12-08 RX ORDER — HEPARIN 100 UNIT/ML
500 SYRINGE INTRAVENOUS
Status: CANCELLED | OUTPATIENT
Start: 2024-12-13

## 2024-12-08 RX ORDER — SODIUM CHLORIDE 0.9 % (FLUSH) 0.9 %
10 SYRINGE (ML) INJECTION
Status: CANCELLED | OUTPATIENT
Start: 2024-12-11

## 2024-12-08 RX ORDER — ATROPINE SULFATE 0.4 MG/ML
0.4 INJECTION, SOLUTION ENDOTRACHEAL; INTRAMEDULLARY; INTRAMUSCULAR; INTRAVENOUS; SUBCUTANEOUS
Status: CANCELLED
Start: 2024-12-11

## 2024-12-08 RX ORDER — SODIUM CHLORIDE 0.9 % (FLUSH) 0.9 %
10 SYRINGE (ML) INJECTION
Status: CANCELLED | OUTPATIENT
Start: 2024-12-13

## 2024-12-08 RX ORDER — HEPARIN 100 UNIT/ML
500 SYRINGE INTRAVENOUS
Status: CANCELLED | OUTPATIENT
Start: 2024-12-11

## 2024-12-08 RX ORDER — PROCHLORPERAZINE EDISYLATE 5 MG/ML
10 INJECTION INTRAMUSCULAR; INTRAVENOUS ONCE AS NEEDED
Status: CANCELLED | OUTPATIENT
Start: 2024-12-11

## 2024-12-08 RX ORDER — DIPHENHYDRAMINE HYDROCHLORIDE 50 MG/ML
50 INJECTION INTRAMUSCULAR; INTRAVENOUS ONCE AS NEEDED
Status: CANCELLED | OUTPATIENT
Start: 2024-12-11

## 2024-12-10 NOTE — H&P (VIEW-ONLY)
Subjective:       Patient ID: Alondra Snyder is a 59 y.o. female.    Chief Complaint:  Here for scan results    Diagnosis: Recurrent metastatic rectal cancer                    Stage IIIA rectal carcinoma 3/15 (T2 N1b M0); 3/28+ nodes     Treatment History  Oxaliplatin/Xeloda x6 completed 8/15  --> Xeloda/XRT completed 11/11/15    Current Treatment: FOLFIRI + Bevacizumab s/p 10 cycles (started 7/31/24)     Clinical History: Patient was referred for a first time screening colonoscopy at the age of 49 by her gynecologist.  She had no abdominal symptoms or complaints, changes in her bowel habits, melena or hematochezia.  Colonoscopy revealed a malignant appearing polyp at the rectosigmoid junction.  Biopsy was positive for adenocarcinoma.  Preoperative CEA level was normal 2 ng/mL.  Chest x-ray was unremarkable.  CT A/P showed a large solid left adnexal mass measuring 4.9 x 6.1 cm.  There was no specific abnormality in the sigmoid colon or rectum and no evidence of metastatic disease.  Pelvic ultrasound confirmed that the lesion was a uterine fibroid.  She underwent a laparoscopic resection with primary reanastomosis 3/18/15.  Final pathology showed a 2 cm moderately differentiated adenocarcinoma invading into but not through the muscularis propria.  3 out of 28 lymph nodes were positive for metastatic involvement. All resection margins were clear.  At the time of surgery, the lesion was delineated to be in the upper rectum. She recovered from her surgery uneventfully.  She completed adjuvant chemotherapy and radiation as documented above. Her Mediport catheter was removed 10/17.    She was lost to follow-up from 9/19 until 6/21/23 due to the COVID-19 pandemic.  She reported no significant problems or health issues in the interim.  She had a screening colonoscopy 1/2/20 that showed a single polyp in the ascending colon which was removed with pathology showing benign polypoid colonic mucosa with no evidence of adenoma.   Follow-up exam was recommended in 5 years.    Laboratory testing 5/31/24 prior to her annual surveillance visit showed an elevated CEA level of 10.16 ng/mL.  Testing was repeated on 6/6/24 with a level of 9.84 ng/mL.  CT C/A/P 6/10/24 showed a 7.8 cm lobulated soft tissue mass in the left upper lobe extending from the left hilum to the pleural margin.  There was a 1.5 cm right hilar node and small AP window nodes.  There was a stable nodular soft tissue density adjacent to the left uterine margin unchanged from 2019.  CT-guided biopsy 6/17/24 revealed a metastatic adenocarcinoma consistent with colorectal primary.  She had left on a trip to [place] when the results came back.  She arranged to be seen at [facility] while she was in [place].    Workup at [facility]  CT-PET scan 7/12/24:  Large DIANA hypermetabolic mass measuring up to 8.1 cm with central necrosis.  Multiple hypermetabolic hepatic metastases.  MRI abdomen 7/12/24:  At least 7 bipolar hepatic metastases, largest 3.0 cm segment MIGUEL.  MRI brain 7/12/24:  No metastatic disease.    Molecular testing:  HER2 negative.  Pathology QNS for additional molecular studies.    [facility] peripheral blood 8/7/24: +KRAS G12D, FRANCESCO.  Positive mutations of APC, PTEN, TP53 and FBXW7    She was started on palliative chemotherapy with a regimen of FOLFIRI and Bevacizumab 7/31/24.  CT C/A/P 10/21/24: DIANA mass 5.9 x 5.4 cm (previous 6.9 x 7.3 cm), left hilar LN 1.5 x 1.1 cm (previous 1.8 x 1.5 cm).  Multiple hypodense liver metastases, largest lesion left hepatic lobe 4.9 cm.        CT C/A/P 12/19/24:  Stable DIANA mass 5.8 x 5.2 cm.  Bilobar hepatic metastases, several showing slight interval enlargement.  Right adrenal nodule 1.2 cm (previous 1 cm).    Interval History  She returns to the office today for a 4 week follow-up visit accompanied by her significant other.  She has now completed 10 cycles of treatment with FOLFIRI and Bevacizumab.  She has tolerated treatment  "well.  She has not required any dose reductions or treatment delays.  She has had no significant hypertension or proteinuria.  She has no signs or symptoms attributable to her metastatic disease.  Restaging CT scans of the chest, abdomen and pelvis 12/1924 showed slight interval progression of several hepatic metastatic lesions.  Large DIANA mass was stable to slightly smaller.  There were no new sites of disease.       Review of Systems   Constitutional:  Negative for appetite change, fatigue, fever and unexpected weight change.   HENT:  Negative for nasal congestion, mouth sores, sore throat and trouble swallowing.    Eyes: Negative.    Respiratory:  Negative for cough, shortness of breath and wheezing.    Cardiovascular:  Negative for chest pain, palpitations and leg swelling.   Gastrointestinal:  Negative for abdominal distention, abdominal pain, constipation, diarrhea and nausea.   Genitourinary:  Negative for dysuria, flank pain and frequency.   Musculoskeletal:  Negative for arthralgias and back pain.   Integumentary:  Negative for pallor and rash.   Neurological:  Negative for dizziness, weakness, numbness and headaches.   Hematological:  Negative for adenopathy. Does not bruise/bleed easily.   Psychiatric/Behavioral: Negative.         PMHx:  Endometriosis, anxiety/depression  PSHx:  Tonsils, sinus surgery, right oophorectomy, partial colectomy  SH:  Lifetime nonsmoker, occasional alcohol use.  Lives in [place] with her significant other.  Works as a .  FH:  Mother had kidney cancer.  A maternal aunt and 1st cousin had breast cancer.    Objective:        /73 (Patient Position: Sitting)   Pulse 67   Temp 97.8 °F (36.6 °C)   Resp 15   Ht 5' 7" (1.702 m)   Wt 80.3 kg (177 lb)   SpO2 97%   BMI 27.72 kg/m²    Physical Exam  Constitutional:       Comments: Well-developed white female in NAD   HENT:      Head: Normocephalic.      Mouth/Throat:      Mouth: Mucous membranes are " moist.      Pharynx: Oropharynx is clear. No posterior oropharyngeal erythema.   Eyes:      General: No scleral icterus.     Extraocular Movements: Extraocular movements intact.      Pupils: Pupils are equal, round, and reactive to light.   Cardiovascular:      Rate and Rhythm: Normal rate and regular rhythm.      Heart sounds: No murmur heard.     Comments: MediPort catheter right chest wall and MediPort removal incision left chest wall.  Pulmonary:      Comments: Lungs clear to auscultation  Abdominal:      General: Bowel sounds are normal. There is no distension.      Palpations: Abdomen is soft.      Tenderness: There is no abdominal tenderness.      Comments: Well-healed midline incision below umbilicus and laparoscopic sites. No palpable masses or organomegaly.   Musculoskeletal:         General: No swelling or tenderness. Normal range of motion.      Cervical back: Neck supple. No tenderness.   Skin:     General: Skin is warm and dry.      Findings: No rash.   Neurological:      General: No focal deficit present.      Mental Status: She is alert and oriented to person, place, and time.      Cranial Nerves: No cranial nerve deficit.      Motor: No weakness.       ECOG SCORE    0 - Fully active-able to carry on all pre-disease performance without restriction          LABORATORY  Recent Results (from the past week)   CBC with Differential    Collection Time: 12/23/24  1:06 PM   Result Value Ref Range    WBC 6.02 4.50 - 11.50 x10(3)/mcL    RBC 4.15 (L) 4.20 - 5.40 x10(6)/mcL    Hgb 13.1 12.0 - 16.0 g/dL    Hct 40.0 37.0 - 47.0 %    MCV 96.4 (H) 80.0 - 94.0 fL    MCH 31.6 (H) 27.0 - 31.0 pg    MCHC 32.8 (L) 33.0 - 36.0 g/dL    RDW 16.6 11.5 - 17.0 %    Platelet 423 (H) 130 - 400 x10(3)/mcL    MPV 7.9 7.4 - 10.4 fL    Neut % 48.3 %    Lymph % 36.9 %    Mono % 11.0 %    Eos % 2.8 %    Basophil % 0.5 %    Lymph # 2.22 0.6 - 4.6 x10(3)/mcL    Neut # 2.91 2.1 - 9.2 x10(3)/mcL    Mono # 0.66 0.1 - 1.3 x10(3)/mcL    Eos  # 0.17 0 - 0.9 x10(3)/mcL    Baso # 0.03 <=0.2 x10(3)/mcL    IG# 0.03 0 - 0.04 x10(3)/mcL    IG% 0.5 %   Protein,UA (Dipstick)    Collection Time: 12/23/24  1:11 PM   Result Value Ref Range    Protein, UA Negative Negative                      8/26/24 9/18/24 9/23/24 11/13/24  CEA        23.9        17.1        16.0         11.8    Assessment:   Metastatic rectal cancer      Plan:   CT images were reviewed in the office in the presence of the patient and her family.  There may be some slight progression of her hepatic metastatic disease.  She remains essentially asymptomatic.  Her current treatment will be placed on hold pending additional evaluation.  Consult Interventional Radiology for CT-guided biopsy of the liver to reassess her biology and for additional molecular testing to help guide additional treatment options.  RTC after the biopsy for results review and additional recommendations.      TIM HARDY MD     Other Physicians  Dr. Jaimee Sanches

## 2024-12-10 NOTE — PROGRESS NOTES
Subjective:       Patient ID: Alondra Snyder is a 59 y.o. female.    Chief Complaint:  Here for scan results    Diagnosis: Recurrent metastatic rectal cancer                    Stage IIIA rectal carcinoma 3/15 (T2 N1b M0); 3/28+ nodes     Treatment History  Oxaliplatin/Xeloda x6 completed 8/15  --> Xeloda/XRT completed 11/11/15    Current Treatment: FOLFIRI + Bevacizumab s/p 10 cycles (started 7/31/24)     Clinical History: Patient was referred for a first time screening colonoscopy at the age of 49 by her gynecologist.  She had no abdominal symptoms or complaints, changes in her bowel habits, melena or hematochezia.  Colonoscopy revealed a malignant appearing polyp at the rectosigmoid junction.  Biopsy was positive for adenocarcinoma.  Preoperative CEA level was normal 2 ng/mL.  Chest x-ray was unremarkable.  CT A/P showed a large solid left adnexal mass measuring 4.9 x 6.1 cm.  There was no specific abnormality in the sigmoid colon or rectum and no evidence of metastatic disease.  Pelvic ultrasound confirmed that the lesion was a uterine fibroid.  She underwent a laparoscopic resection with primary reanastomosis 3/18/15.  Final pathology showed a 2 cm moderately differentiated adenocarcinoma invading into but not through the muscularis propria.  3 out of 28 lymph nodes were positive for metastatic involvement. All resection margins were clear.  At the time of surgery, the lesion was delineated to be in the upper rectum. She recovered from her surgery uneventfully.  She completed adjuvant chemotherapy and radiation as documented above. Her Mediport catheter was removed 10/17.    She was lost to follow-up from 9/19 until 6/21/23 due to the COVID-19 pandemic.  She reported no significant problems or health issues in the interim.  She had a screening colonoscopy 1/2/20 that showed a single polyp in the ascending colon which was removed with pathology showing benign polypoid colonic mucosa with no evidence of adenoma.   Follow-up exam was recommended in 5 years.    Laboratory testing 5/31/24 prior to her annual surveillance visit showed an elevated CEA level of 10.16 ng/mL.  Testing was repeated on 6/6/24 with a level of 9.84 ng/mL.  CT C/A/P 6/10/24 showed a 7.8 cm lobulated soft tissue mass in the left upper lobe extending from the left hilum to the pleural margin.  There was a 1.5 cm right hilar node and small AP window nodes.  There was a stable nodular soft tissue density adjacent to the left uterine margin unchanged from 2019.  CT-guided biopsy 6/17/24 revealed a metastatic adenocarcinoma consistent with colorectal primary.  She had left on a trip to [place] when the results came back.  She arranged to be seen at [facility] while she was in [place].    Workup at [facility]  CT-PET scan 7/12/24:  Large DIANA hypermetabolic mass measuring up to 8.1 cm with central necrosis.  Multiple hypermetabolic hepatic metastases.  MRI abdomen 7/12/24:  At least 7 bipolar hepatic metastases, largest 3.0 cm segment MIGUEL.  MRI brain 7/12/24:  No metastatic disease.    Molecular testing:  HER2 negative.  Pathology QNS for additional molecular studies.    [facility] peripheral blood 8/7/24: +KRAS G12D, FRANCESCO.  Positive mutations of APC, PTEN, TP53 and FBXW7    She was started on palliative chemotherapy with a regimen of FOLFIRI and Bevacizumab 7/31/24.  CT C/A/P 10/21/24: DIANA mass 5.9 x 5.4 cm (previous 6.9 x 7.3 cm), left hilar LN 1.5 x 1.1 cm (previous 1.8 x 1.5 cm).  Multiple hypodense liver metastases, largest lesion left hepatic lobe 4.9 cm.        CT C/A/P 12/19/24:  Stable DIANA mass 5.8 x 5.2 cm.  Bilobar hepatic metastases, several showing slight interval enlargement.  Right adrenal nodule 1.2 cm (previous 1 cm).    Interval History  She returns to the office today for a 4 week follow-up visit accompanied by her significant other.  She has now completed 10 cycles of treatment with FOLFIRI and Bevacizumab.  She has tolerated treatment  "well.  She has not required any dose reductions or treatment delays.  She has had no significant hypertension or proteinuria.  She has no signs or symptoms attributable to her metastatic disease.  Restaging CT scans of the chest, abdomen and pelvis 12/1924 showed slight interval progression of several hepatic metastatic lesions.  Large DIANA mass was stable to slightly smaller.  There were no new sites of disease.       Review of Systems   Constitutional:  Negative for appetite change, fatigue, fever and unexpected weight change.   HENT:  Negative for nasal congestion, mouth sores, sore throat and trouble swallowing.    Eyes: Negative.    Respiratory:  Negative for cough, shortness of breath and wheezing.    Cardiovascular:  Negative for chest pain, palpitations and leg swelling.   Gastrointestinal:  Negative for abdominal distention, abdominal pain, constipation, diarrhea and nausea.   Genitourinary:  Negative for dysuria, flank pain and frequency.   Musculoskeletal:  Negative for arthralgias and back pain.   Integumentary:  Negative for pallor and rash.   Neurological:  Negative for dizziness, weakness, numbness and headaches.   Hematological:  Negative for adenopathy. Does not bruise/bleed easily.   Psychiatric/Behavioral: Negative.         PMHx:  Endometriosis, anxiety/depression  PSHx:  Tonsils, sinus surgery, right oophorectomy, partial colectomy  SH:  Lifetime nonsmoker, occasional alcohol use.  Lives in [place] with her significant other.  Works as a .  FH:  Mother had kidney cancer.  A maternal aunt and 1st cousin had breast cancer.    Objective:        /73 (Patient Position: Sitting)   Pulse 67   Temp 97.8 °F (36.6 °C)   Resp 15   Ht 5' 7" (1.702 m)   Wt 80.3 kg (177 lb)   SpO2 97%   BMI 27.72 kg/m²    Physical Exam  Constitutional:       Comments: Well-developed white female in NAD   HENT:      Head: Normocephalic.      Mouth/Throat:      Mouth: Mucous membranes are " moist.      Pharynx: Oropharynx is clear. No posterior oropharyngeal erythema.   Eyes:      General: No scleral icterus.     Extraocular Movements: Extraocular movements intact.      Pupils: Pupils are equal, round, and reactive to light.   Cardiovascular:      Rate and Rhythm: Normal rate and regular rhythm.      Heart sounds: No murmur heard.     Comments: MediPort catheter right chest wall and MediPort removal incision left chest wall.  Pulmonary:      Comments: Lungs clear to auscultation  Abdominal:      General: Bowel sounds are normal. There is no distension.      Palpations: Abdomen is soft.      Tenderness: There is no abdominal tenderness.      Comments: Well-healed midline incision below umbilicus and laparoscopic sites. No palpable masses or organomegaly.   Musculoskeletal:         General: No swelling or tenderness. Normal range of motion.      Cervical back: Neck supple. No tenderness.   Skin:     General: Skin is warm and dry.      Findings: No rash.   Neurological:      General: No focal deficit present.      Mental Status: She is alert and oriented to person, place, and time.      Cranial Nerves: No cranial nerve deficit.      Motor: No weakness.       ECOG SCORE    0 - Fully active-able to carry on all pre-disease performance without restriction          LABORATORY  Recent Results (from the past week)   CBC with Differential    Collection Time: 12/23/24  1:06 PM   Result Value Ref Range    WBC 6.02 4.50 - 11.50 x10(3)/mcL    RBC 4.15 (L) 4.20 - 5.40 x10(6)/mcL    Hgb 13.1 12.0 - 16.0 g/dL    Hct 40.0 37.0 - 47.0 %    MCV 96.4 (H) 80.0 - 94.0 fL    MCH 31.6 (H) 27.0 - 31.0 pg    MCHC 32.8 (L) 33.0 - 36.0 g/dL    RDW 16.6 11.5 - 17.0 %    Platelet 423 (H) 130 - 400 x10(3)/mcL    MPV 7.9 7.4 - 10.4 fL    Neut % 48.3 %    Lymph % 36.9 %    Mono % 11.0 %    Eos % 2.8 %    Basophil % 0.5 %    Lymph # 2.22 0.6 - 4.6 x10(3)/mcL    Neut # 2.91 2.1 - 9.2 x10(3)/mcL    Mono # 0.66 0.1 - 1.3 x10(3)/mcL    Eos  # 0.17 0 - 0.9 x10(3)/mcL    Baso # 0.03 <=0.2 x10(3)/mcL    IG# 0.03 0 - 0.04 x10(3)/mcL    IG% 0.5 %   Protein,UA (Dipstick)    Collection Time: 12/23/24  1:11 PM   Result Value Ref Range    Protein, UA Negative Negative                      8/26/24 9/18/24 9/23/24 11/13/24  CEA        23.9        17.1        16.0         11.8    Assessment:   Metastatic rectal cancer      Plan:   CT images were reviewed in the office in the presence of the patient and her family.  There may be some slight progression of her hepatic metastatic disease.  She remains essentially asymptomatic.  Her current treatment will be placed on hold pending additional evaluation.  Consult Interventional Radiology for CT-guided biopsy of the liver to reassess her biology and for additional molecular testing to help guide additional treatment options.  RTC after the biopsy for results review and additional recommendations.      TIM HARDY MD     Other Physicians  Dr. Jaimee Sanches

## 2024-12-11 ENCOUNTER — LAB VISIT (OUTPATIENT)
Dept: LAB | Facility: HOSPITAL | Age: 59
End: 2024-12-11
Attending: INTERNAL MEDICINE
Payer: COMMERCIAL

## 2024-12-11 ENCOUNTER — INFUSION (OUTPATIENT)
Dept: INFUSION THERAPY | Facility: HOSPITAL | Age: 59
End: 2024-12-11
Attending: INTERNAL MEDICINE
Payer: COMMERCIAL

## 2024-12-11 VITALS
BODY MASS INDEX: 27.72 KG/M2 | WEIGHT: 176.63 LBS | RESPIRATION RATE: 18 BRPM | DIASTOLIC BLOOD PRESSURE: 58 MMHG | SYSTOLIC BLOOD PRESSURE: 109 MMHG | HEIGHT: 67 IN | HEART RATE: 62 BPM | OXYGEN SATURATION: 97 % | TEMPERATURE: 98 F

## 2024-12-11 DIAGNOSIS — C78.02 SECONDARY MALIGNANCY OF LEFT LUNG: ICD-10-CM

## 2024-12-11 DIAGNOSIS — C78.01 MALIGNANT NEOPLASM METASTATIC TO RIGHT LUNG: Primary | ICD-10-CM

## 2024-12-11 DIAGNOSIS — C78.01 MALIGNANT NEOPLASM METASTATIC TO RIGHT LUNG: ICD-10-CM

## 2024-12-11 DIAGNOSIS — C20 RECTAL CANCER: ICD-10-CM

## 2024-12-11 DIAGNOSIS — Z13.89 SCREENING FOR HEMATURIA OR PROTEINURIA: ICD-10-CM

## 2024-12-11 LAB
ALBUMIN SERPL-MCNC: 3.4 G/DL (ref 3.4–4.8)
ALBUMIN/GLOB SERPL: 0.8 RATIO (ref 1.1–2)
ALP SERPL-CCNC: 150 UNIT/L (ref 40–150)
ALT SERPL-CCNC: 13 UNIT/L (ref 0–55)
ANION GAP SERPL CALC-SCNC: 5 MEQ/L
AST SERPL-CCNC: 18 UNIT/L (ref 5–34)
BASOPHILS # BLD AUTO: 0.09 X10(3)/MCL
BASOPHILS NFR BLD AUTO: 1.5 %
BILIRUB SERPL-MCNC: 0.4 MG/DL
BUN SERPL-MCNC: 14 MG/DL (ref 9.8–20.1)
CALCIUM SERPL-MCNC: 9 MG/DL (ref 8.4–10.2)
CHLORIDE SERPL-SCNC: 103 MMOL/L (ref 98–107)
CO2 SERPL-SCNC: 28 MMOL/L (ref 23–31)
CREAT SERPL-MCNC: 0.69 MG/DL (ref 0.55–1.02)
CREAT/UREA NIT SERPL: 20
EOSINOPHIL # BLD AUTO: 0.25 X10(3)/MCL (ref 0–0.9)
EOSINOPHIL NFR BLD AUTO: 4.1 %
ERYTHROCYTE [DISTWIDTH] IN BLOOD BY AUTOMATED COUNT: 17.3 % (ref 11.5–17)
GFR SERPLBLD CREATININE-BSD FMLA CKD-EPI: >60 ML/MIN/1.73/M2
GLOBULIN SER-MCNC: 4.2 GM/DL (ref 2.4–3.5)
GLUCOSE SERPL-MCNC: 88 MG/DL (ref 82–115)
HCT VFR BLD AUTO: 42 % (ref 37–47)
HGB BLD-MCNC: 13.3 G/DL (ref 12–16)
IMM GRANULOCYTES # BLD AUTO: 0.02 X10(3)/MCL (ref 0–0.04)
IMM GRANULOCYTES NFR BLD AUTO: 0.3 %
LYMPHOCYTES # BLD AUTO: 2.11 X10(3)/MCL (ref 0.6–4.6)
LYMPHOCYTES NFR BLD AUTO: 34.7 %
MCH RBC QN AUTO: 30.5 PG (ref 27–31)
MCHC RBC AUTO-ENTMCNC: 31.7 G/DL (ref 33–36)
MCV RBC AUTO: 96.3 FL (ref 80–94)
MONOCYTES # BLD AUTO: 0.7 X10(3)/MCL (ref 0.1–1.3)
MONOCYTES NFR BLD AUTO: 11.5 %
NEUTROPHILS # BLD AUTO: 2.91 X10(3)/MCL (ref 2.1–9.2)
NEUTROPHILS NFR BLD AUTO: 47.9 %
PLATELET # BLD AUTO: 407 X10(3)/MCL (ref 130–400)
PMV BLD AUTO: 7.8 FL (ref 7.4–10.4)
POTASSIUM SERPL-SCNC: 4.2 MMOL/L (ref 3.5–5.1)
PROT SERPL-MCNC: 7.6 GM/DL (ref 5.8–7.6)
PROT UR QL STRIP: NEGATIVE
RBC # BLD AUTO: 4.36 X10(6)/MCL (ref 4.2–5.4)
SODIUM SERPL-SCNC: 136 MMOL/L (ref 136–145)
WBC # BLD AUTO: 6.08 X10(3)/MCL (ref 4.5–11.5)

## 2024-12-11 PROCEDURE — 96417 CHEMO IV INFUS EACH ADDL SEQ: CPT

## 2024-12-11 PROCEDURE — 96368 THER/DIAG CONCURRENT INF: CPT

## 2024-12-11 PROCEDURE — 96413 CHEMO IV INFUSION 1 HR: CPT

## 2024-12-11 PROCEDURE — 63600175 PHARM REV CODE 636 W HCPCS: Performed by: INTERNAL MEDICINE

## 2024-12-11 PROCEDURE — 81005 URINALYSIS: CPT

## 2024-12-11 PROCEDURE — 36415 COLL VENOUS BLD VENIPUNCTURE: CPT

## 2024-12-11 PROCEDURE — 96416 CHEMO PROLONG INFUSE W/PUMP: CPT

## 2024-12-11 PROCEDURE — 96367 TX/PROPH/DG ADDL SEQ IV INF: CPT

## 2024-12-11 PROCEDURE — 85025 COMPLETE CBC W/AUTO DIFF WBC: CPT

## 2024-12-11 PROCEDURE — 25000003 PHARM REV CODE 250: Performed by: INTERNAL MEDICINE

## 2024-12-11 PROCEDURE — 96375 TX/PRO/DX INJ NEW DRUG ADDON: CPT

## 2024-12-11 PROCEDURE — 96415 CHEMO IV INFUSION ADDL HR: CPT

## 2024-12-11 PROCEDURE — 80053 COMPREHEN METABOLIC PANEL: CPT

## 2024-12-11 PROCEDURE — 96411 CHEMO IV PUSH ADDL DRUG: CPT

## 2024-12-11 RX ORDER — EPINEPHRINE 0.3 MG/.3ML
0.3 INJECTION SUBCUTANEOUS ONCE AS NEEDED
Status: DISCONTINUED | OUTPATIENT
Start: 2024-12-11 | End: 2024-12-11 | Stop reason: HOSPADM

## 2024-12-11 RX ORDER — DIPHENHYDRAMINE HYDROCHLORIDE 50 MG/ML
50 INJECTION INTRAMUSCULAR; INTRAVENOUS ONCE AS NEEDED
Status: DISCONTINUED | OUTPATIENT
Start: 2024-12-11 | End: 2024-12-11 | Stop reason: HOSPADM

## 2024-12-11 RX ORDER — PROCHLORPERAZINE EDISYLATE 5 MG/ML
10 INJECTION INTRAMUSCULAR; INTRAVENOUS ONCE AS NEEDED
Status: DISCONTINUED | OUTPATIENT
Start: 2024-12-11 | End: 2024-12-11 | Stop reason: HOSPADM

## 2024-12-11 RX ORDER — ATROPINE SULFATE 0.4 MG/ML
0.4 INJECTION, SOLUTION ENDOTRACHEAL; INTRAMEDULLARY; INTRAMUSCULAR; INTRAVENOUS; SUBCUTANEOUS
Status: COMPLETED | OUTPATIENT
Start: 2024-12-11 | End: 2024-12-11

## 2024-12-11 RX ORDER — SODIUM CHLORIDE 0.9 % (FLUSH) 0.9 %
10 SYRINGE (ML) INJECTION
Status: DISCONTINUED | OUTPATIENT
Start: 2024-12-11 | End: 2024-12-11 | Stop reason: HOSPADM

## 2024-12-11 RX ORDER — HEPARIN 100 UNIT/ML
500 SYRINGE INTRAVENOUS
Status: DISCONTINUED | OUTPATIENT
Start: 2024-12-11 | End: 2024-12-11 | Stop reason: HOSPADM

## 2024-12-11 RX ORDER — FLUOROURACIL 50 MG/ML
400 INJECTION, SOLUTION INTRAVENOUS
Status: COMPLETED | OUTPATIENT
Start: 2024-12-11 | End: 2024-12-11

## 2024-12-11 RX ADMIN — FLUOROURACIL 4500 MG: 50 INJECTION, SOLUTION INTRAVENOUS at 01:12

## 2024-12-11 RX ADMIN — FLUOROURACIL 775 MG: 50 INJECTION, SOLUTION INTRAVENOUS at 01:12

## 2024-12-11 RX ADMIN — BEVACIZUMAB-AWWB 400 MG: 400 INJECTION, SOLUTION INTRAVENOUS at 10:12

## 2024-12-11 RX ADMIN — ATROPINE SULFATE 0.4 MG: 0.4 INJECTION, SOLUTION INTRAVENOUS at 11:12

## 2024-12-11 RX ADMIN — LEUCOVORIN CALCIUM 750 MG: 350 INJECTION, POWDER, LYOPHILIZED, FOR SOLUTION INTRAMUSCULAR; INTRAVENOUS at 11:12

## 2024-12-11 RX ADMIN — SODIUM CHLORIDE 0.25 MG: 9 INJECTION, SOLUTION INTRAVENOUS at 11:12

## 2024-12-11 RX ADMIN — IRINOTECAN HYDROCHLORIDE 340 MG: 20 INJECTION, SOLUTION INTRAVENOUS at 11:12

## 2024-12-11 RX ADMIN — SODIUM CHLORIDE: 9 INJECTION, SOLUTION INTRAVENOUS at 09:12

## 2024-12-11 NOTE — PLAN OF CARE
Patient received C10D1, tolerated well. Left facility with CADD pump. Will return on Friday around 1140. Voiced understanding. Discharge home in stable condition.

## 2024-12-13 ENCOUNTER — INFUSION (OUTPATIENT)
Dept: INFUSION THERAPY | Facility: HOSPITAL | Age: 59
End: 2024-12-13
Attending: INTERNAL MEDICINE
Payer: COMMERCIAL

## 2024-12-13 VITALS
RESPIRATION RATE: 18 BRPM | OXYGEN SATURATION: 95 % | SYSTOLIC BLOOD PRESSURE: 103 MMHG | HEART RATE: 65 BPM | DIASTOLIC BLOOD PRESSURE: 68 MMHG | TEMPERATURE: 99 F

## 2024-12-13 DIAGNOSIS — C78.01 MALIGNANT NEOPLASM METASTATIC TO RIGHT LUNG: Primary | ICD-10-CM

## 2024-12-13 DIAGNOSIS — C78.02 SECONDARY MALIGNANCY OF LEFT LUNG: ICD-10-CM

## 2024-12-13 PROCEDURE — 63600175 PHARM REV CODE 636 W HCPCS: Performed by: INTERNAL MEDICINE

## 2024-12-13 PROCEDURE — 25000003 PHARM REV CODE 250: Performed by: INTERNAL MEDICINE

## 2024-12-13 PROCEDURE — A4216 STERILE WATER/SALINE, 10 ML: HCPCS | Performed by: INTERNAL MEDICINE

## 2024-12-13 RX ORDER — SODIUM CHLORIDE 0.9 % (FLUSH) 0.9 %
10 SYRINGE (ML) INJECTION
Status: DISCONTINUED | OUTPATIENT
Start: 2024-12-13 | End: 2024-12-13 | Stop reason: HOSPADM

## 2024-12-13 RX ORDER — HEPARIN 100 UNIT/ML
500 SYRINGE INTRAVENOUS
Status: DISCONTINUED | OUTPATIENT
Start: 2024-12-13 | End: 2024-12-13 | Stop reason: HOSPADM

## 2024-12-13 RX ORDER — PROCHLORPERAZINE EDISYLATE 5 MG/ML
10 INJECTION INTRAMUSCULAR; INTRAVENOUS ONCE AS NEEDED
Status: DISCONTINUED | OUTPATIENT
Start: 2024-12-13 | End: 2024-12-13 | Stop reason: HOSPADM

## 2024-12-13 RX ADMIN — HEPARIN 500 UNITS: 100 SYRINGE at 11:12

## 2024-12-13 RX ADMIN — Medication 10 ML: at 11:12

## 2024-12-19 ENCOUNTER — HOSPITAL ENCOUNTER (OUTPATIENT)
Dept: RADIOLOGY | Facility: HOSPITAL | Age: 59
Discharge: HOME | End: 2024-12-19
Attending: INTERNAL MEDICINE
Payer: COMMERCIAL

## 2024-12-19 ENCOUNTER — TELEPHONE (OUTPATIENT)
Dept: HEMATOLOGY/ONCOLOGY | Facility: CLINIC | Age: 59
End: 2024-12-19
Payer: COMMERCIAL

## 2024-12-19 DIAGNOSIS — C78.7 SECONDARY MALIGNANT NEOPLASM OF LIVER: ICD-10-CM

## 2024-12-19 DIAGNOSIS — C20 RECTAL CANCER: ICD-10-CM

## 2024-12-19 DIAGNOSIS — C78.01 MALIGNANT NEOPLASM METASTATIC TO RIGHT LUNG: ICD-10-CM

## 2024-12-19 PROCEDURE — 71260 CT THORAX DX C+: CPT | Mod: TC

## 2024-12-19 PROCEDURE — 25500020 PHARM REV CODE 255: Performed by: INTERNAL MEDICINE

## 2024-12-19 RX ORDER — DIATRIZOATE MEGLUMINE AND DIATRIZOATE SODIUM 660; 100 MG/ML; MG/ML
30 SOLUTION ORAL; RECTAL
Status: COMPLETED | OUTPATIENT
Start: 2024-12-19 | End: 2024-12-19

## 2024-12-19 RX ADMIN — IOHEXOL 100 ML: 350 INJECTION, SOLUTION INTRAVENOUS at 01:12

## 2024-12-19 RX ADMIN — DIATRIZOATE MEGLUMINE AND DIATRIZOATE SODIUM 30 ML: 660; 100 LIQUID ORAL; RECTAL at 01:12

## 2024-12-19 NOTE — TELEPHONE ENCOUNTER
Message received from Ana in [facility]: Patient left  stating that she wanted to cancel her appts on 12/24/2024 & 12/26/2024 Her call back # is [phone] TIA     Message received from Cori, our :Talked to patient about rescheduling...she does not want to reschedule anything until she meets with Dr. Hernandez on 12/23.

## 2024-12-23 ENCOUNTER — LAB VISIT (OUTPATIENT)
Dept: LAB | Facility: HOSPITAL | Age: 59
End: 2024-12-23
Attending: INTERNAL MEDICINE
Payer: COMMERCIAL

## 2024-12-23 ENCOUNTER — OFFICE VISIT (OUTPATIENT)
Dept: HEMATOLOGY/ONCOLOGY | Facility: CLINIC | Age: 59
End: 2024-12-23
Payer: COMMERCIAL

## 2024-12-23 VITALS
DIASTOLIC BLOOD PRESSURE: 73 MMHG | OXYGEN SATURATION: 97 % | HEART RATE: 67 BPM | RESPIRATION RATE: 15 BRPM | WEIGHT: 177 LBS | BODY MASS INDEX: 27.78 KG/M2 | HEIGHT: 67 IN | TEMPERATURE: 98 F | SYSTOLIC BLOOD PRESSURE: 117 MMHG

## 2024-12-23 DIAGNOSIS — Z79.899 DRUG-INDUCED IMMUNODEFICIENCY: ICD-10-CM

## 2024-12-23 DIAGNOSIS — D84.821 DRUG-INDUCED IMMUNODEFICIENCY: ICD-10-CM

## 2024-12-23 DIAGNOSIS — Z13.89 SCREENING FOR HEMATURIA OR PROTEINURIA: ICD-10-CM

## 2024-12-23 DIAGNOSIS — C78.01 MALIGNANT NEOPLASM METASTATIC TO RIGHT LUNG: ICD-10-CM

## 2024-12-23 DIAGNOSIS — C78.7 SECONDARY MALIGNANT NEOPLASM OF LIVER: ICD-10-CM

## 2024-12-23 DIAGNOSIS — C20 RECTAL CANCER: Primary | ICD-10-CM

## 2024-12-23 DIAGNOSIS — C20 RECTAL CANCER: ICD-10-CM

## 2024-12-23 LAB
ALBUMIN SERPL-MCNC: 3.6 G/DL (ref 3.4–4.8)
ALBUMIN/GLOB SERPL: 0.9 RATIO (ref 1.1–2)
ALP SERPL-CCNC: 152 UNIT/L (ref 40–150)
ALT SERPL-CCNC: 17 UNIT/L (ref 0–55)
ANION GAP SERPL CALC-SCNC: 9 MEQ/L
AST SERPL-CCNC: 25 UNIT/L (ref 5–34)
BASOPHILS # BLD AUTO: 0.03 X10(3)/MCL
BASOPHILS NFR BLD AUTO: 0.5 %
BILIRUB SERPL-MCNC: 0.4 MG/DL
BUN SERPL-MCNC: 13.8 MG/DL (ref 9.8–20.1)
CALCIUM SERPL-MCNC: 9.5 MG/DL (ref 8.4–10.2)
CEA SERPL-MCNC: 11.7 NG/ML (ref 0–3)
CHLORIDE SERPL-SCNC: 105 MMOL/L (ref 98–107)
CO2 SERPL-SCNC: 26 MMOL/L (ref 23–31)
CREAT SERPL-MCNC: 0.69 MG/DL (ref 0.55–1.02)
CREAT/UREA NIT SERPL: 20
EOSINOPHIL # BLD AUTO: 0.17 X10(3)/MCL (ref 0–0.9)
EOSINOPHIL NFR BLD AUTO: 2.8 %
ERYTHROCYTE [DISTWIDTH] IN BLOOD BY AUTOMATED COUNT: 16.6 % (ref 11.5–17)
GFR SERPLBLD CREATININE-BSD FMLA CKD-EPI: >60 ML/MIN/1.73/M2
GLOBULIN SER-MCNC: 3.8 GM/DL (ref 2.4–3.5)
GLUCOSE SERPL-MCNC: 103 MG/DL (ref 82–115)
HCT VFR BLD AUTO: 40 % (ref 37–47)
HGB BLD-MCNC: 13.1 G/DL (ref 12–16)
IMM GRANULOCYTES # BLD AUTO: 0.03 X10(3)/MCL (ref 0–0.04)
IMM GRANULOCYTES NFR BLD AUTO: 0.5 %
LYMPHOCYTES # BLD AUTO: 2.22 X10(3)/MCL (ref 0.6–4.6)
LYMPHOCYTES NFR BLD AUTO: 36.9 %
MCH RBC QN AUTO: 31.6 PG (ref 27–31)
MCHC RBC AUTO-ENTMCNC: 32.8 G/DL (ref 33–36)
MCV RBC AUTO: 96.4 FL (ref 80–94)
MONOCYTES # BLD AUTO: 0.66 X10(3)/MCL (ref 0.1–1.3)
MONOCYTES NFR BLD AUTO: 11 %
NEUTROPHILS # BLD AUTO: 2.91 X10(3)/MCL (ref 2.1–9.2)
NEUTROPHILS NFR BLD AUTO: 48.3 %
PLATELET # BLD AUTO: 423 X10(3)/MCL (ref 130–400)
PMV BLD AUTO: 7.9 FL (ref 7.4–10.4)
POTASSIUM SERPL-SCNC: 3.9 MMOL/L (ref 3.5–5.1)
PROT SERPL-MCNC: 7.4 GM/DL (ref 5.8–7.6)
PROT UR QL STRIP: NEGATIVE
RBC # BLD AUTO: 4.15 X10(6)/MCL (ref 4.2–5.4)
SODIUM SERPL-SCNC: 140 MMOL/L (ref 136–145)
WBC # BLD AUTO: 6.02 X10(3)/MCL (ref 4.5–11.5)

## 2024-12-23 PROCEDURE — 1159F MED LIST DOCD IN RCRD: CPT | Mod: CPTII,S$GLB,, | Performed by: INTERNAL MEDICINE

## 2024-12-23 PROCEDURE — 82378 CARCINOEMBRYONIC ANTIGEN: CPT

## 2024-12-23 PROCEDURE — 3074F SYST BP LT 130 MM HG: CPT | Mod: CPTII,S$GLB,, | Performed by: INTERNAL MEDICINE

## 2024-12-23 PROCEDURE — 3078F DIAST BP <80 MM HG: CPT | Mod: CPTII,S$GLB,, | Performed by: INTERNAL MEDICINE

## 2024-12-23 PROCEDURE — 85025 COMPLETE CBC W/AUTO DIFF WBC: CPT

## 2024-12-23 PROCEDURE — 36415 COLL VENOUS BLD VENIPUNCTURE: CPT

## 2024-12-23 PROCEDURE — 3044F HG A1C LEVEL LT 7.0%: CPT | Mod: CPTII,S$GLB,, | Performed by: INTERNAL MEDICINE

## 2024-12-23 PROCEDURE — 81005 URINALYSIS: CPT

## 2024-12-23 PROCEDURE — 3008F BODY MASS INDEX DOCD: CPT | Mod: CPTII,S$GLB,, | Performed by: INTERNAL MEDICINE

## 2024-12-23 PROCEDURE — 99999 PR PBB SHADOW E&M-EST. PATIENT-LVL IV: CPT | Mod: PBBFAC,,, | Performed by: INTERNAL MEDICINE

## 2024-12-23 PROCEDURE — 99214 OFFICE O/P EST MOD 30 MIN: CPT | Mod: S$GLB,,, | Performed by: INTERNAL MEDICINE

## 2024-12-23 PROCEDURE — 1160F RVW MEDS BY RX/DR IN RCRD: CPT | Mod: CPTII,S$GLB,, | Performed by: INTERNAL MEDICINE

## 2024-12-23 PROCEDURE — 80053 COMPREHEN METABOLIC PANEL: CPT

## 2024-12-27 DIAGNOSIS — Z00.00 WELLNESS EXAMINATION: Primary | ICD-10-CM

## 2024-12-27 DIAGNOSIS — Z13.29 SCREENING FOR ENDOCRINE, NUTRITIONAL, METABOLIC AND IMMUNITY DISORDER: ICD-10-CM

## 2024-12-27 DIAGNOSIS — Z13.6 SCREENING FOR CARDIOVASCULAR, RESPIRATORY, AND GENITOURINARY DISEASES: ICD-10-CM

## 2024-12-27 DIAGNOSIS — Z13.83 SCREENING FOR CARDIOVASCULAR, RESPIRATORY, AND GENITOURINARY DISEASES: ICD-10-CM

## 2024-12-27 DIAGNOSIS — Z13.21 SCREENING FOR ENDOCRINE, NUTRITIONAL, METABOLIC AND IMMUNITY DISORDER: ICD-10-CM

## 2024-12-27 DIAGNOSIS — Z13.89 SCREENING FOR CARDIOVASCULAR, RESPIRATORY, AND GENITOURINARY DISEASES: ICD-10-CM

## 2024-12-27 DIAGNOSIS — E78.2 MIXED HYPERLIPIDEMIA: ICD-10-CM

## 2024-12-27 DIAGNOSIS — Z13.228 SCREENING FOR ENDOCRINE, NUTRITIONAL, METABOLIC AND IMMUNITY DISORDER: ICD-10-CM

## 2024-12-27 DIAGNOSIS — Z13.0 SCREENING FOR ENDOCRINE, NUTRITIONAL, METABOLIC AND IMMUNITY DISORDER: ICD-10-CM

## 2025-01-02 ENCOUNTER — TELEPHONE (OUTPATIENT)
Dept: INTERNAL MEDICINE | Facility: CLINIC | Age: 60
End: 2025-01-02
Payer: COMMERCIAL

## 2025-01-02 NOTE — TELEPHONE ENCOUNTER
----- Message from Med Assistant Brooke sent at 12/27/2024 11:15 AM CST -----  Regarding: PV Thursday 1-9-25  Wellness Appointment    Fasting wellness labs ordered and ready to do.     Last Wellness 1-8-24

## 2025-01-06 ENCOUNTER — PATIENT MESSAGE (OUTPATIENT)
Dept: HEMATOLOGY/ONCOLOGY | Facility: CLINIC | Age: 60
End: 2025-01-06
Payer: COMMERCIAL

## 2025-01-09 ENCOUNTER — OFFICE VISIT (OUTPATIENT)
Dept: INTERNAL MEDICINE | Facility: CLINIC | Age: 60
End: 2025-01-09
Payer: COMMERCIAL

## 2025-01-09 VITALS
OXYGEN SATURATION: 96 % | HEART RATE: 88 BPM | BODY MASS INDEX: 27.94 KG/M2 | DIASTOLIC BLOOD PRESSURE: 86 MMHG | HEIGHT: 67 IN | SYSTOLIC BLOOD PRESSURE: 132 MMHG | WEIGHT: 178 LBS

## 2025-01-09 DIAGNOSIS — F41.1 GENERALIZED ANXIETY DISORDER: ICD-10-CM

## 2025-01-09 DIAGNOSIS — C20 RECTAL CANCER: ICD-10-CM

## 2025-01-09 DIAGNOSIS — C78.7 SECONDARY MALIGNANT NEOPLASM OF LIVER: ICD-10-CM

## 2025-01-09 DIAGNOSIS — Z00.00 WELLNESS EXAMINATION: Primary | ICD-10-CM

## 2025-01-09 PROCEDURE — 1159F MED LIST DOCD IN RCRD: CPT | Mod: CPTII,,, | Performed by: INTERNAL MEDICINE

## 2025-01-09 PROCEDURE — 3075F SYST BP GE 130 - 139MM HG: CPT | Mod: CPTII,,, | Performed by: INTERNAL MEDICINE

## 2025-01-09 PROCEDURE — 1160F RVW MEDS BY RX/DR IN RCRD: CPT | Mod: CPTII,,, | Performed by: INTERNAL MEDICINE

## 2025-01-09 PROCEDURE — 99396 PREV VISIT EST AGE 40-64: CPT | Mod: ,,, | Performed by: INTERNAL MEDICINE

## 2025-01-09 PROCEDURE — 3008F BODY MASS INDEX DOCD: CPT | Mod: CPTII,,, | Performed by: INTERNAL MEDICINE

## 2025-01-09 PROCEDURE — 3079F DIAST BP 80-89 MM HG: CPT | Mod: CPTII,,, | Performed by: INTERNAL MEDICINE

## 2025-01-09 NOTE — ASSESSMENT & PLAN NOTE
-labs are reviewed all essentially normal except for elevated alkaline phosphatase, globulin and CEA   -patient is advised on importance of watching her carbohydrate intake and saturated fat intake, making the right nutritional choices and exercising on a regular basis  -up-to-date with the screening

## 2025-01-09 NOTE — ASSESSMENT & PLAN NOTE
-with metastasis to the lung and liver   -scheduled for a CT-guided liver biopsy to rule out any secondary primary versus metastatic disease   -followed closely by Oncology

## 2025-01-09 NOTE — PROGRESS NOTES
Subjective:      Patient ID: Alondra Snyder is a 59 y.o. female.    Chief Complaint: Annual Exam (Wellness//)    Ms. Boles is a 59-year-old female that is here today for her annual wellness visit.  Her significant medical history is recurrent metastatic rectal cancer status post 10 cycles of FOLFIRI and bevacizumab.  Is being followed by Oncology closely with surveillance CT scan showing a 7.8 cm lobulated soft tissue mass in the left upper lobe and 1.5 cm hilar node.  CT-guided biopsy done in June of 2024 revealed metastatic adenocarcinoma consistent with colorectal primary.  She was evaluated also in [place].  A PET-CT done in July of 2024 showed left upper lobe hypermetabolic mass in the liver as well as multiple hypermetabolic hepatic metastasis.  MRI confirmed this however MRI brain was noted to be negative for metastatic disease.  The December scans showed stable left upper lobe mass with some decreased in size however the bilobar hepatic masses were showing interval enlargement.  Because of the progression of the hepatic metastasis, a biopsy is ordered to rule out if there is a 2nd primary  As such patient is completely asymptomatic and in very good spirits.  Currently scheduled for a CT-guided biopsy per Interventional Radiology on 01/20/2025.  Today her wellness labs are reviewed and noted to be essentially normal except for elevated alkaline phosphatase, elevated globulin an elevated CEA at 11.7  No acute needs or complain    The patient's Health Maintenance was reviewed and the following appears to be due at this time:   Health Maintenance Due   Topic Date Due    Hepatitis C Screening  Never done    TETANUS VACCINE  Never done    Shingles Vaccine (1 of 2) Never done    Pneumococcal Vaccines (Age 50+) (1 of 2 - PCV) Never done    COVID-19 Vaccine (3 - Moderna risk series) 01/18/2022    RSV Vaccine (Age 60+ and Pregnant patients) (1 - Risk 60-74 years 1-dose series) Never done    Influenza Vaccine (1)  09/01/2024    Mammogram  02/01/2025        Past Medical History:  Past Medical History:   Diagnosis Date    Anxiety disorder, unspecified     Carpal tunnel syndrome     Malignant neoplasm of rectum     Rectal carcinoma      Past Surgical History:   Procedure Laterality Date    ADENOIDECTOMY  1968    BREAST SURGERY  2010    benign cyst removed    COLON RESECTION      COLON SURGERY  2015    COLONOSCOPY      HYSTERECTOMY  1993    only had one ovary removed, due to endometriosis    removal of right ovary Right     SINUS SURGERY      TONSILLECTOMY  1968     Review of patient's allergies indicates:  No Known Allergies  Social History     Socioeconomic History    Marital status:    Tobacco Use    Smoking status: Never    Smokeless tobacco: Never   Substance and Sexual Activity    Alcohol use: Not Currently     Alcohol/week: 2.0 standard drinks of alcohol    Drug use: Never    Sexual activity: Not Currently     Partners: Female     Social Drivers of Health     Financial Resource Strain: Low Risk  (7/6/2023)    Overall Financial Resource Strain (CARDIA)     Difficulty of Paying Living Expenses: Not hard at all   Food Insecurity: No Food Insecurity (7/6/2023)    Hunger Vital Sign     Worried About Running Out of Food in the Last Year: Never true     Ran Out of Food in the Last Year: Never true   Transportation Needs: No Transportation Needs (7/6/2023)    PRAPARE - Transportation     Lack of Transportation (Medical): No     Lack of Transportation (Non-Medical): No   Physical Activity: Sufficiently Active (7/6/2023)    Exercise Vital Sign     Days of Exercise per Week: 7 days     Minutes of Exercise per Session: 60 min   Stress: No Stress Concern Present (7/6/2023)    [employer] - Occupational Stress Questionnaire     Feeling of Stress : Not at all   Housing Stability: Low Risk  (7/3/2024)    Received from [facility]     What is your housing situation  "today?: I am staying with others     How many times have you moved in the past 12 months?: Zero (I did not move)     Family History   Problem Relation Name Age of Onset    Cancer Mother Clare Snyder         renal    Early death Father Frank Snyder     Cirrhosis Father Frank Snyder     Diabetes Brother Jaiden Snyder     Diabetes Brother Wilbur Snyder        Review of Systems    A comprehensive review of systems was performed and is negative except for that stated above  Objective:   /86 (BP Location: Right arm, Patient Position: Sitting)   Pulse 88   Ht 5' 7" (1.702 m)   Wt 80.7 kg (178 lb)   SpO2 96%   BMI 27.88 kg/m²     Physical Exam  Constitutional:       Appearance: Normal appearance.   HENT:      Head: Normocephalic and atraumatic.      Nose: Nose normal.      Mouth/Throat:      Mouth: Mucous membranes are moist.      Pharynx: Oropharynx is clear.   Eyes:      Extraocular Movements: Extraocular movements intact.      Pupils: Pupils are equal, round, and reactive to light.   Cardiovascular:      Rate and Rhythm: Normal rate and regular rhythm.      Pulses: Normal pulses.   Pulmonary:      Effort: Pulmonary effort is normal.      Breath sounds: Normal breath sounds.   Abdominal:      General: Bowel sounds are normal.      Palpations: Abdomen is soft.   Musculoskeletal:         General: Normal range of motion.      Cervical back: Normal range of motion and neck supple.   Skin:     General: Skin is warm.   Neurological:      General: No focal deficit present.      Mental Status: She is alert and oriented to person, place, and time. Mental status is at baseline.   Psychiatric:         Mood and Affect: Mood normal.       Assessment/ Plan:   1. Wellness examination  Assessment & Plan:  -labs are reviewed all essentially normal except for elevated alkaline phosphatase, globulin and CEA   -patient is advised on importance of watching her carbohydrate intake and saturated fat intake, making the right nutritional " choices and exercising on a regular basis  -up-to-date with the screening          2. Secondary malignant neoplasm of liver    3. Rectal cancer  Assessment & Plan:  -with metastasis to the lung and liver   -scheduled for a CT-guided liver biopsy to rule out any secondary primary versus metastatic disease   -followed closely by Oncology      4. Generalized anxiety disorder  Assessment & Plan:  -stable overall, on Zoloft 100 mg p.o. daily, continue

## 2025-01-13 ENCOUNTER — HOSPITAL ENCOUNTER (OUTPATIENT)
Dept: INTERVENTIONAL RADIOLOGY/VASCULAR | Facility: HOSPITAL | Age: 60
Discharge: HOME | End: 2025-01-13
Attending: INTERNAL MEDICINE
Payer: COMMERCIAL

## 2025-01-13 VITALS
BODY MASS INDEX: 27.48 KG/M2 | HEART RATE: 70 BPM | SYSTOLIC BLOOD PRESSURE: 97 MMHG | DIASTOLIC BLOOD PRESSURE: 50 MMHG | HEIGHT: 67 IN | OXYGEN SATURATION: 96 % | WEIGHT: 175.06 LBS | TEMPERATURE: 98 F | RESPIRATION RATE: 15 BRPM

## 2025-01-13 DIAGNOSIS — C78.01 SECONDARY MALIGNANT NEOPLASM OF RIGHT LUNG: ICD-10-CM

## 2025-01-13 DIAGNOSIS — C20 RECTAL CANCER: ICD-10-CM

## 2025-01-13 DIAGNOSIS — C78.7 SECONDARY MALIGNANCY OF LIVER: ICD-10-CM

## 2025-01-13 LAB
ALBUMIN SERPL-MCNC: 3.2 G/DL (ref 3.4–4.8)
ALBUMIN/GLOB SERPL: 0.9 RATIO (ref 1.1–2)
ALP SERPL-CCNC: 263 UNIT/L (ref 40–150)
ALT SERPL-CCNC: 23 UNIT/L (ref 0–55)
ANION GAP SERPL CALC-SCNC: 10 MEQ/L
AST SERPL-CCNC: 26 UNIT/L (ref 5–34)
BASOPHILS # BLD AUTO: 0.15 X10(3)/MCL
BASOPHILS NFR BLD AUTO: 1.8 %
BILIRUB SERPL-MCNC: 0.5 MG/DL
BUN SERPL-MCNC: 11.7 MG/DL (ref 9.8–20.1)
CALCIUM SERPL-MCNC: 9.5 MG/DL (ref 8.4–10.2)
CHLORIDE SERPL-SCNC: 105 MMOL/L (ref 98–107)
CO2 SERPL-SCNC: 25 MMOL/L (ref 23–31)
CREAT SERPL-MCNC: 0.59 MG/DL (ref 0.55–1.02)
CREAT/UREA NIT SERPL: 20
EOSINOPHIL # BLD AUTO: 0.58 X10(3)/MCL (ref 0–0.9)
EOSINOPHIL NFR BLD AUTO: 7 %
ERYTHROCYTE [DISTWIDTH] IN BLOOD BY AUTOMATED COUNT: 15.4 % (ref 11.5–17)
GFR SERPLBLD CREATININE-BSD FMLA CKD-EPI: >60 ML/MIN/1.73/M2
GLOBULIN SER-MCNC: 3.5 GM/DL (ref 2.4–3.5)
GLUCOSE SERPL-MCNC: 93 MG/DL (ref 82–115)
HCT VFR BLD AUTO: 39.3 % (ref 37–47)
HGB BLD-MCNC: 12.7 G/DL (ref 12–16)
IMM GRANULOCYTES # BLD AUTO: 0.03 X10(3)/MCL (ref 0–0.04)
IMM GRANULOCYTES NFR BLD AUTO: 0.4 %
INR PPP: 1
LYMPHOCYTES # BLD AUTO: 2.02 X10(3)/MCL (ref 0.6–4.6)
LYMPHOCYTES NFR BLD AUTO: 24.2 %
MCH RBC QN AUTO: 29.8 PG (ref 27–31)
MCHC RBC AUTO-ENTMCNC: 32.3 G/DL (ref 33–36)
MCV RBC AUTO: 92.3 FL (ref 80–94)
MONOCYTES # BLD AUTO: 0.98 X10(3)/MCL (ref 0.1–1.3)
MONOCYTES NFR BLD AUTO: 11.8 %
NEUTROPHILS # BLD AUTO: 4.57 X10(3)/MCL (ref 2.1–9.2)
NEUTROPHILS NFR BLD AUTO: 54.8 %
NRBC BLD AUTO-RTO: 0 %
PLATELET # BLD AUTO: 534 X10(3)/MCL (ref 130–400)
PMV BLD AUTO: 8.6 FL (ref 7.4–10.4)
POTASSIUM SERPL-SCNC: 4.1 MMOL/L (ref 3.5–5.1)
PROT SERPL-MCNC: 6.7 GM/DL (ref 5.8–7.6)
PROTHROMBIN TIME: 13.2 SECONDS (ref 12.5–14.5)
RBC # BLD AUTO: 4.26 X10(6)/MCL (ref 4.2–5.4)
SODIUM SERPL-SCNC: 140 MMOL/L (ref 136–145)
WBC # BLD AUTO: 8.33 X10(3)/MCL (ref 4.5–11.5)

## 2025-01-13 PROCEDURE — 99152 MOD SED SAME PHYS/QHP 5/>YRS: CPT

## 2025-01-13 PROCEDURE — 85025 COMPLETE CBC W/AUTO DIFF WBC: CPT | Performed by: RADIOLOGY

## 2025-01-13 PROCEDURE — 85610 PROTHROMBIN TIME: CPT | Performed by: RADIOLOGY

## 2025-01-13 PROCEDURE — 36415 COLL VENOUS BLD VENIPUNCTURE: CPT | Performed by: RADIOLOGY

## 2025-01-13 PROCEDURE — 63600175 PHARM REV CODE 636 W HCPCS: Performed by: RADIOLOGY

## 2025-01-13 PROCEDURE — 80053 COMPREHEN METABOLIC PANEL: CPT | Performed by: RADIOLOGY

## 2025-01-13 PROCEDURE — 77002 NEEDLE LOCALIZATION BY XRAY: CPT | Mod: TC

## 2025-01-13 RX ORDER — MIDAZOLAM HYDROCHLORIDE 2 MG/2ML
INJECTION, SOLUTION INTRAMUSCULAR; INTRAVENOUS
Status: DISCONTINUED
Start: 2025-01-13 | End: 2025-01-14 | Stop reason: HOSPADM

## 2025-01-13 RX ORDER — MIDAZOLAM HYDROCHLORIDE 2 MG/2ML
INJECTION, SOLUTION INTRAMUSCULAR; INTRAVENOUS
Status: COMPLETED | OUTPATIENT
Start: 2025-01-13 | End: 2025-01-13

## 2025-01-13 RX ORDER — LIDOCAINE HYDROCHLORIDE 20 MG/ML
INJECTION, SOLUTION INFILTRATION; PERINEURAL
Status: COMPLETED | OUTPATIENT
Start: 2025-01-13 | End: 2025-01-13

## 2025-01-13 RX ORDER — FENTANYL CITRATE 50 UG/ML
INJECTION, SOLUTION INTRAMUSCULAR; INTRAVENOUS
Status: DISCONTINUED
Start: 2025-01-13 | End: 2025-01-14 | Stop reason: HOSPADM

## 2025-01-13 RX ORDER — FENTANYL CITRATE 50 UG/ML
INJECTION, SOLUTION INTRAMUSCULAR; INTRAVENOUS
Status: COMPLETED | OUTPATIENT
Start: 2025-01-13 | End: 2025-01-13

## 2025-01-13 RX ADMIN — FENTANYL CITRATE 25 MCG: 50 INJECTION INTRAMUSCULAR; INTRAVENOUS at 12:01

## 2025-01-13 RX ADMIN — MIDAZOLAM HYDROCHLORIDE 1 MG: 1 INJECTION, SOLUTION INTRAMUSCULAR; INTRAVENOUS at 12:01

## 2025-01-13 RX ADMIN — LIDOCAINE HYDROCHLORIDE 5 ML: 20 INJECTION, SOLUTION INFILTRATION; PERINEURAL at 12:01

## 2025-01-13 NOTE — INTERVAL H&P NOTE
The patient has been examined and the H&P has been reviewed:    I concur with the findings and no changes have occurred since H&P was written.60 yo F with rectal cancer and liver mass presents for liver mass biopsy.        There are no hospital problems to display for this patient.

## 2025-01-13 NOTE — DISCHARGE SUMMARY
Radiology Post-Procedure Note    Pre Op Diagnosis: Liver mass with h/o Rectal Cancer    Post Op Diagnosis: Same    Secondary Diagnoses:   Problem List Items Addressed This Visit       Rectal cancer    Relevant Orders    IR Biopsy Liver     Other Visit Diagnoses       Secondary malignancy of liver        Relevant Orders    IR Biopsy Liver    Secondary malignant neoplasm of right lung        Relevant Orders    IR Biopsy Liver             Procedure: CT Guided Liver Mass Biopsy    Procedure performed by: Jacinto Guidry MD    Assistant: None    Written Informed Consent Obtained: Yes    Specimen Removed: 18g core x 6    Estimated Blood Loss: <10 cc    Condition: Stable    Outcome: The patient tolerated the procedure well and was without complications.    For further details please see the imaging report associated.    Disposition: Home or Self Care    Follow Up: With primary care provider    Discharge Instructions:  No discharge procedures on file.       Time Spent On Discharge: 2 minutes

## 2025-01-16 NOTE — PROGRESS NOTES
Subjective:       Patient ID: Alondra Snyder is a 59 y.o. female.    Chief Complaint:  Anxious about what comes next    Diagnosis: Metastatic rectal cancer                    Stage IIIA rectal carcinoma 3/15 (T2 N1b M0); 3/28+ nodes     Treatment History  Oxaliplatin/Xeloda x6 completed 8/15  --> Xeloda/XRT completed 11/11/15  FOLFIRI + Bevacizumab x 10 (7/24-12/24)    Current Treatment:  Treatment planning     Clinical History: Patient was referred for a first time screening colonoscopy at the age of 49 by her gynecologist.  She had no abdominal symptoms or complaints, changes in her bowel habits, melena or hematochezia.  Colonoscopy revealed a malignant appearing polyp at the rectosigmoid junction.  Biopsy was positive for adenocarcinoma.  Preoperative CEA level was normal 2 ng/mL.  Chest x-ray was unremarkable.  CT A/P showed a large solid left adnexal mass measuring 4.9 x 6.1 cm.  There was no specific abnormality in the sigmoid colon or rectum and no evidence of metastatic disease.  Pelvic ultrasound confirmed that the lesion was a uterine fibroid.  She underwent a laparoscopic resection with primary reanastomosis 3/18/15.  Final pathology showed a 2 cm moderately differentiated adenocarcinoma invading into but not through the muscularis propria.  3 out of 28 lymph nodes were positive for metastatic involvement. All resection margins were clear.  At the time of surgery, the lesion was delineated to be in the upper rectum. She recovered from her surgery uneventfully.  She completed adjuvant chemotherapy and radiation as documented above. Her Mediport catheter was removed 10/17.    She was lost to follow-up from 9/19 until 6/21/23 due to the COVID-19 pandemic.  She reported no significant problems or health issues in the interim.  She had a screening colonoscopy 1/2/20 that showed a single polyp in the ascending colon which was removed with pathology showing benign polypoid colonic mucosa with no evidence of  adenoma.  Follow-up exam was recommended in 5 years.    Laboratory testing 5/31/24 prior to her annual surveillance visit showed an elevated CEA level of 10.16 ng/mL.  Testing was repeated on 6/6/24 with a level of 9.84 ng/mL.  CT C/A/P 6/10/24 showed a 7.8 cm lobulated soft tissue mass in the left upper lobe extending from the left hilum to the pleural margin.  There was a 1.5 cm right hilar node and small AP window nodes.  There was a stable nodular soft tissue density adjacent to the left uterine margin unchanged from 2019.  CT-guided biopsy 6/17/24 revealed a metastatic adenocarcinoma consistent with colorectal primary.  She had left on a trip to [place] when the results came back.  She arranged to be seen at [facility] while she was in [place].    Workup at [facility]  CT-PET scan 7/12/24:  Large DIANA hypermetabolic mass measuring up to 8.1 cm with central necrosis.  Multiple hypermetabolic hepatic metastases.  MRI abdomen 7/12/24:  At least 7 bipolar hepatic metastases, largest 3.0 cm segment MIGUEL.  MRI brain 7/12/24:  No metastatic disease.    Molecular testing:  HER2 negative.  Pathology QNS for additional molecular studies.    [facility] peripheral blood 8/7/24: +KRAS G12D, FRANCESCO.  Positive mutations of APC, PTEN, TP53 and FBXW7    She was started on palliative chemotherapy with a regimen of FOLFIRI and Bevacizumab 7/31/24.  CT C/A/P 10/21/24: DIANA mass 5.9 x 5.4 cm (previous 6.9 x 7.3 cm), left hilar LN 1.5 x 1.1 cm (previous 1.8 x 1.5 cm).  Multiple hypodense liver metastases, largest lesion left hepatic lobe 4.9 cm.        CT C/A/P 12/19/24:  Stable DIANA mass 5.8 x 5.2 cm.  Bilobar hepatic metastases, several showing slight interval enlargement.  Right adrenal nodule 1.2 cm (previous 1 cm).    CT-guided liver biopsy 1/13/25:  Metastatic adenocarcinoma consistent with colorectal primary (CK20 and CDX2+)    Interval History  She returns to clinic today for a follow-up visit accompanied by her significant  "other.  Restaging CT scans 12/1924 showed minor disease progression involving the hepatic metastases.  She underwent a CT-guided biopsy 1/13/25 confirming metastatic colorectal cancer.  Pathology submitted for NGS testing.  She is requesting a second opinion at another facility such as [facility].  She continues to feel well.  She has only occasional right upper quadrant discomfort, but not overt pain.  Appetite is good, no weight loss.  No chest pain, shortness of breath, cough or sputum production.       Review of Systems   Constitutional:  Negative for appetite change, fatigue, fever and unexpected weight change.   HENT:  Negative for nasal congestion, mouth sores, sore throat and trouble swallowing.    Eyes: Negative.    Respiratory:  Negative for cough, shortness of breath and wheezing.    Cardiovascular:  Negative for chest pain, palpitations and leg swelling.   Gastrointestinal:  Negative for abdominal distention, abdominal pain, constipation, diarrhea and nausea.   Genitourinary:  Negative for dysuria, flank pain and frequency.   Musculoskeletal:  Negative for arthralgias and back pain.   Integumentary:  Negative for pallor and rash.   Neurological:  Negative for dizziness, weakness, numbness and headaches.   Hematological:  Negative for adenopathy. Does not bruise/bleed easily.   Psychiatric/Behavioral: Negative.         PMHx:  Endometriosis, anxiety/depression  PSHx:  Tonsils, sinus surgery, right oophorectomy, partial colectomy  SH:  Lifetime nonsmoker, occasional alcohol use.  Lives in [place] with her significant other.  Works as a .  FH:  Mother had kidney cancer.  A maternal aunt and 1st cousin had breast cancer.    Objective:        /74 (Patient Position: Sitting)   Pulse 98   Temp 98.4 °F (36.9 °C)   Resp 15   Ht 5' 7" (1.702 m)   Wt 80.3 kg (177 lb)   SpO2 99%   BMI 27.72 kg/m²    Physical Exam  Constitutional:       Comments: Well-developed white female in " NAD   HENT:      Head: Normocephalic.      Mouth/Throat:      Mouth: Mucous membranes are moist.      Pharynx: Oropharynx is clear. No posterior oropharyngeal erythema.   Eyes:      General: No scleral icterus.     Extraocular Movements: Extraocular movements intact.      Pupils: Pupils are equal, round, and reactive to light.   Cardiovascular:      Rate and Rhythm: Normal rate and regular rhythm.      Heart sounds: No murmur heard.     Comments: MediPort catheter right chest wall and MediPort removal incision left chest wall.  Pulmonary:      Comments: Lungs clear to auscultation  Abdominal:      General: Bowel sounds are normal. There is no distension.      Palpations: Abdomen is soft.      Tenderness: There is no abdominal tenderness.      Comments: Well-healed midline incision below umbilicus and laparoscopic sites. No palpable masses or organomegaly.   Musculoskeletal:         General: No swelling or tenderness. Normal range of motion.      Cervical back: Neck supple. No tenderness.   Skin:     General: Skin is warm and dry.      Findings: No rash.   Neurological:      General: No focal deficit present.      Mental Status: She is alert and oriented to person, place, and time.      Cranial Nerves: No cranial nerve deficit.      Motor: No weakness.       ECOG SCORE    0 - Fully active-able to carry on all pre-disease performance without restriction          LABORATORY  Recent Results (from the past week)   Comprehensive Metabolic Panel    Collection Time: 01/13/25  9:45 AM   Result Value Ref Range    Sodium 140 136 - 145 mmol/L    Potassium 4.1 3.5 - 5.1 mmol/L    Chloride 105 98 - 107 mmol/L    CO2 25 23 - 31 mmol/L    Glucose 93 82 - 115 mg/dL    Blood Urea Nitrogen 11.7 9.8 - 20.1 mg/dL    Creatinine 0.59 0.55 - 1.02 mg/dL    Calcium 9.5 8.4 - 10.2 mg/dL    Protein Total 6.7 5.8 - 7.6 gm/dL    Albumin 3.2 (L) 3.4 - 4.8 g/dL    Globulin 3.5 2.4 - 3.5 gm/dL    Albumin/Globulin Ratio 0.9 (L) 1.1 - 2.0 ratio     Bilirubin Total 0.5 <=1.5 mg/dL     (H) 40 - 150 unit/L    ALT 23 0 - 55 unit/L    AST 26 5 - 34 unit/L    eGFR >60 mL/min/1.73/m2    Anion Gap 10.0 mEq/L    BUN/Creatinine Ratio 20    Protime-INR    Collection Time: 25  9:45 AM   Result Value Ref Range    PT 13.2 12.5 - 14.5 seconds    INR 1.0 <=1.3   CBC with Differential    Collection Time: 25  9:45 AM   Result Value Ref Range    WBC 8.33 4.50 - 11.50 x10(3)/mcL    RBC 4.26 4.20 - 5.40 x10(6)/mcL    Hgb 12.7 12.0 - 16.0 g/dL    Hct 39.3 37.0 - 47.0 %    MCV 92.3 80.0 - 94.0 fL    MCH 29.8 27.0 - 31.0 pg    MCHC 32.3 (L) 33.0 - 36.0 g/dL    RDW 15.4 11.5 - 17.0 %    Platelet 534 (H) 130 - 400 x10(3)/mcL    MPV 8.6 7.4 - 10.4 fL    Neut % 54.8 %    Lymph % 24.2 %    Mono % 11.8 %    Eos % 7.0 %    Basophil % 1.8 %    Imm Grans % 0.4 %    Neut # 4.57 2.1 - 9.2 x10(3)/mcL    Lymph # 2.02 0.6 - 4.6 x10(3)/mcL    Mono # 0.98 0.1 - 1.3 x10(3)/mcL    Eos # 0.58 0 - 0.9 x10(3)/mcL    Baso # 0.15 <=0.2 x10(3)/mcL    Imm Gran # 0.03 0.00 - 0.04 x10(3)/mcL    NRBC% 0.0 %   Specimen to Pathology Liver    Collection Time: 25 12:57 PM   Result Value Ref Range    Pathology Result                      24  CEA        23.9        17.1        16.0         11.8    Assessment:   Metastatic rectal cancer      Plan:   CT-guided biopsy of the liver confirmed colorectal cancer.  Tissue submitted for additional molecular testing.  Options for additional palliative treatment were discussed includin) Y-90 embolization to target the hepatic metastatic disease followed by additional systemic therapy, 2) re-treatment with an Oxaliplatin based chemotherapy regimen or 3) consideration for clinical trial participation.  The benefits, risks, side-effects, advantages and disadvantages of each treatment option were discussed in detail over the course of a 30 minute office visit.  She is interested in obtaining a 2nd opinion, either in  person or via telemedicine to determine if she is eligible for a clinical trial.  She was given additional literature to review regarding Y-90 embolization.  Following additional review of her case, we will decide on an appropriate plan of treatment.      TIM HARDY MD     Other Physicians  Dr. Jaimee Sanches

## 2025-01-17 ENCOUNTER — OFFICE VISIT (OUTPATIENT)
Dept: HEMATOLOGY/ONCOLOGY | Facility: CLINIC | Age: 60
End: 2025-01-17
Payer: COMMERCIAL

## 2025-01-17 VITALS
SYSTOLIC BLOOD PRESSURE: 110 MMHG | TEMPERATURE: 98 F | WEIGHT: 177 LBS | OXYGEN SATURATION: 99 % | BODY MASS INDEX: 27.78 KG/M2 | HEART RATE: 98 BPM | HEIGHT: 67 IN | DIASTOLIC BLOOD PRESSURE: 74 MMHG | RESPIRATION RATE: 15 BRPM

## 2025-01-17 DIAGNOSIS — C20 RECTAL CANCER: Primary | ICD-10-CM

## 2025-01-17 DIAGNOSIS — C78.7 SECONDARY MALIGNANT NEOPLASM OF LIVER: ICD-10-CM

## 2025-01-17 DIAGNOSIS — C78.01 MALIGNANT NEOPLASM METASTATIC TO RIGHT LUNG: ICD-10-CM

## 2025-01-17 DIAGNOSIS — C78.7 METASTATIC CARCINOMA TO LIVER: Primary | ICD-10-CM

## 2025-01-17 PROCEDURE — 3074F SYST BP LT 130 MM HG: CPT | Mod: CPTII,S$GLB,, | Performed by: INTERNAL MEDICINE

## 2025-01-17 PROCEDURE — 1159F MED LIST DOCD IN RCRD: CPT | Mod: CPTII,S$GLB,, | Performed by: INTERNAL MEDICINE

## 2025-01-17 PROCEDURE — 99999 PR PBB SHADOW E&M-EST. PATIENT-LVL III: CPT | Mod: PBBFAC,,, | Performed by: INTERNAL MEDICINE

## 2025-01-17 PROCEDURE — 3078F DIAST BP <80 MM HG: CPT | Mod: CPTII,S$GLB,, | Performed by: INTERNAL MEDICINE

## 2025-01-17 PROCEDURE — 3008F BODY MASS INDEX DOCD: CPT | Mod: CPTII,S$GLB,, | Performed by: INTERNAL MEDICINE

## 2025-01-17 PROCEDURE — 99215 OFFICE O/P EST HI 40 MIN: CPT | Mod: S$GLB,,, | Performed by: INTERNAL MEDICINE

## 2025-01-17 PROCEDURE — 1160F RVW MEDS BY RX/DR IN RCRD: CPT | Mod: CPTII,S$GLB,, | Performed by: INTERNAL MEDICINE

## 2025-01-31 ENCOUNTER — TELEPHONE (OUTPATIENT)
Dept: HEMATOLOGY/ONCOLOGY | Facility: CLINIC | Age: 60
End: 2025-01-31
Payer: COMMERCIAL

## 2025-01-31 NOTE — TELEPHONE ENCOUNTER
Patient notified of the scheduled appointment with Dr. Lozano for 02/10/25. Agreeable to date and time.

## 2025-02-03 LAB
DNA RANGE(S) EXAMINED NAR: NORMAL
GENE DIS ANL INTERP-IMP: POSITIVE
GENE DIS ASSESSED: NORMAL
GENE MUT TESTED BLD/T: 6.3 M/MB
HLA-A HIGH RES: NORMAL
HLA-B HIGH RES: NORMAL
HLA-C HIGH RES: NORMAL
MSI CA SPEC-IMP: NORMAL
PSYCHE PATHOLOGY RESULT: NORMAL
REASON FOR STUDY: NORMAL
TEMPUS AMENDMENTNOTE1: NORMAL
TEMPUS GERMLINE NOTE: NORMAL
TEMPUS HLA-A AMBIGUOUS ALLELES: YES
TEMPUS HLA-A SAMPLE TYPE: NORMAL
TEMPUS HLA-B AMBIGUOUS ALLELES: YES
TEMPUS HLA-B SAMPLE TYPE: NORMAL
TEMPUS HLA-C AMBIGUOUS ALLELES: YES
TEMPUS HLA-C SAMPLE TYPE: NORMAL
TEMPUS IHC/FISH RESULT2: NEGATIVE
TEMPUS IHC/FISH SCORE: 1
TEMPUS LCA: NORMAL
TEMPUS PERTINENTNEGATIVES: NORMAL
TEMPUS PORTAL: NORMAL
TEMPUS THERAPY1: NORMAL
TEMPUS THERAPYCOUNT: 1
TEMPUS TRIAL1: NORMAL
TEMPUS TRIAL2: NORMAL
TEMPUS TRIAL3: NORMAL
TEMPUS TRIALCOUNT: 3
TEMPUS XR RESULT 1: NORMAL

## 2025-02-10 ENCOUNTER — OFFICE VISIT (OUTPATIENT)
Dept: HEMATOLOGY/ONCOLOGY | Facility: CLINIC | Age: 60
End: 2025-02-10
Payer: COMMERCIAL

## 2025-02-10 VITALS
WEIGHT: 176.13 LBS | OXYGEN SATURATION: 97 % | HEIGHT: 67 IN | DIASTOLIC BLOOD PRESSURE: 74 MMHG | SYSTOLIC BLOOD PRESSURE: 122 MMHG | RESPIRATION RATE: 18 BRPM | HEART RATE: 94 BPM | BODY MASS INDEX: 27.64 KG/M2

## 2025-02-10 DIAGNOSIS — D84.821 DRUG-INDUCED IMMUNODEFICIENCY: ICD-10-CM

## 2025-02-10 DIAGNOSIS — Z79.899 DRUG-INDUCED IMMUNODEFICIENCY: ICD-10-CM

## 2025-02-10 DIAGNOSIS — C78.02 MALIGNANT NEOPLASM METASTATIC TO LEFT LUNG: ICD-10-CM

## 2025-02-10 DIAGNOSIS — C20 RECTAL CANCER: Primary | ICD-10-CM

## 2025-02-10 DIAGNOSIS — C78.7 SECONDARY MALIGNANT NEOPLASM OF LIVER: ICD-10-CM

## 2025-02-10 PROCEDURE — 1159F MED LIST DOCD IN RCRD: CPT | Mod: CPTII,S$GLB,, | Performed by: INTERNAL MEDICINE

## 2025-02-10 PROCEDURE — 3078F DIAST BP <80 MM HG: CPT | Mod: CPTII,S$GLB,, | Performed by: INTERNAL MEDICINE

## 2025-02-10 PROCEDURE — 99999 PR PBB SHADOW E&M-EST. PATIENT-LVL III: CPT | Mod: PBBFAC,,, | Performed by: INTERNAL MEDICINE

## 2025-02-10 PROCEDURE — 3008F BODY MASS INDEX DOCD: CPT | Mod: CPTII,S$GLB,, | Performed by: INTERNAL MEDICINE

## 2025-02-10 PROCEDURE — 3074F SYST BP LT 130 MM HG: CPT | Mod: CPTII,S$GLB,, | Performed by: INTERNAL MEDICINE

## 2025-02-10 PROCEDURE — 99214 OFFICE O/P EST MOD 30 MIN: CPT | Mod: S$GLB,,, | Performed by: INTERNAL MEDICINE

## 2025-02-10 NOTE — PROGRESS NOTES
MEDICAL ONCOLOGY - NEW PATIENT VISIT    Reason for visit: Colon cancer    Best Contact Phone Number(s): [phone] (home)      Cancer/Stage/TNM:    Cancer Staging   Rectal cancer  Staging form: Colon and Rectum, AJCC 8th Edition  - Pathologic stage from 1/17/2025: Stage IVB (rpT2, pN1b, pM1b) - Signed by Ricardo Hernandez MD on 1/17/2025       Oncology History   Secondary malignancy of left lung   7/3/2024 Initial Diagnosis    Secondary malignancy of left lung     7/31/2024 -  Chemotherapy    Treatment Summary   Plan Name: OP GI bevacizumab + FOLFIRI (irinotecan leucovorin fluororuracil) Q2W  Treatment Goal: Palliative  Status: Active  Start Date: 7/31/2024  End Date: 7/25/2025 (Planned)  Provider: Ricardo Hernandez MD  Chemotherapy: fluorouraciL injection 775 mg, 400 mg/m2 = 775 mg, Intravenous, Clinic/HOD 1 time, 10 of 26 cycles  Administration: 775 mg (7/31/2024), 775 mg (8/14/2024), 775 mg (8/28/2024), 775 mg (9/18/2024), 775 mg (10/2/2024), 775 mg (10/16/2024), 775 mg (10/30/2024), 775 mg (11/13/2024), 775 mg (11/27/2024), 775 mg (12/11/2024)  irinotecan (CAMPTOSAR) 180 mg/m2 = 350 mg in 0.9% NaCl 582.5 mL chemo infusion, 180 mg/m2 = 350 mg, Intravenous, Clinic/HOD 1 time, 10 of 26 cycles  Administration: 350 mg (7/31/2024), 350 mg (8/14/2024), 350 mg (8/28/2024), 350 mg (9/18/2024), 340 mg (10/2/2024), 340 mg (10/16/2024), 340 mg (10/30/2024), 340 mg (11/13/2024), 340 mg (11/27/2024), 340 mg (12/11/2024)  fluorouracil (Adrucil) 4,650 mg in 0.9% NaCl 138 mL chemo infusion, 4,655 mg, Intravenous, Over 46 hours, 10 of 26 cycles  Administration: 4,650 mg (7/31/2024), 4,650 mg (8/14/2024), 4,650 mg (8/28/2024), 4,650 mg (9/18/2024), 4,650 mg (10/2/2024), 4,650 mg (10/16/2024), 4,500 mg (10/30/2024), 4,500 mg (11/13/2024), 4,500 mg (11/27/2024), 4,500 mg (12/11/2024)  bevacizumab-awwb (MVASI) 5 mg/kg = 400 mg in 0.9% NaCl 116 mL infusion, 5 mg/kg = 400 mg, Intravenous, [facility] 1 time, 10 of 26  cycles  Administration: 400 mg (7/31/2024), 400 mg (8/14/2024), 400 mg (8/28/2024), 400 mg (9/18/2024), 400 mg (10/2/2024), 400 mg (10/16/2024), 400 mg (10/30/2024), 400 mg (11/13/2024), 400 mg (11/27/2024), 400 mg (12/11/2024)     Malignant neoplasm metastatic to right lung   7/29/2024 Initial Diagnosis    Malignant neoplasm metastatic to right lung     7/31/2024 -  Chemotherapy    Treatment Summary   Plan Name: OP GI bevacizumab + FOLFIRI (irinotecan leucovorin fluororuracil) Q2W  Treatment Goal: Palliative  Status: Active  Start Date: 7/31/2024  End Date: 7/25/2025 (Planned)  Provider: Ricardo Hernandez MD  Chemotherapy: fluorouraciL injection 775 mg, 400 mg/m2 = 775 mg, Intravenous, Clinic/HOD 1 time, 10 of 26 cycles  Administration: 775 mg (7/31/2024), 775 mg (8/14/2024), 775 mg (8/28/2024), 775 mg (9/18/2024), 775 mg (10/2/2024), 775 mg (10/16/2024), 775 mg (10/30/2024), 775 mg (11/13/2024), 775 mg (11/27/2024), 775 mg (12/11/2024)  irinotecan (CAMPTOSAR) 180 mg/m2 = 350 mg in 0.9% NaCl 582.5 mL chemo infusion, 180 mg/m2 = 350 mg, Intravenous, Clinic/HOD 1 time, 10 of 26 cycles  Administration: 350 mg (7/31/2024), 350 mg (8/14/2024), 350 mg (8/28/2024), 350 mg (9/18/2024), 340 mg (10/2/2024), 340 mg (10/16/2024), 340 mg (10/30/2024), 340 mg (11/13/2024), 340 mg (11/27/2024), 340 mg (12/11/2024)  fluorouracil (Adrucil) 4,650 mg in 0.9% NaCl 138 mL chemo infusion, 4,655 mg, Intravenous, Over 46 hours, 10 of 26 cycles  Administration: 4,650 mg (7/31/2024), 4,650 mg (8/14/2024), 4,650 mg (8/28/2024), 4,650 mg (9/18/2024), 4,650 mg (10/2/2024), 4,650 mg (10/16/2024), 4,500 mg (10/30/2024), 4,500 mg (11/13/2024), 4,500 mg (11/27/2024), 4,500 mg (12/11/2024)  bevacizumab-awwb (MVASI) 5 mg/kg = 400 mg in 0.9% NaCl 116 mL infusion, 5 mg/kg = 400 mg, Intravenous, [facility] 1 time, 10 of 26 cycles  Administration: 400 mg (7/31/2024), 400 mg (8/14/2024), 400 mg (8/28/2024), 400 mg (9/18/2024), 400 mg (10/2/2024), 400 mg  (10/16/2024), 400 mg (10/30/2024), 400 mg (11/13/2024), 400 mg (11/27/2024), 400 mg (12/11/2024)     Rectal cancer   8/12/2024 Initial Diagnosis    Rectal cancer     1/17/2025 Cancer Staged    Staging form: Colon and Rectum, AJCC 8th Edition  - Pathologic stage from 1/17/2025: Stage IVB (rpT2, pN1b, pM1b)          HPI:   59 y.o. female with anxiety and metastatic rectal cancer who presents for a second opinion.  She was diagnosed with locally advanced (pT2N1b) rectal adenocarcinoma in 2015.  She underwent LAR 3/18/15.  She then had adjuvant chemotherapy with CAPOX x 6 cycles followed by chemoradiation with capecitabine, completed 11/11/2015.    Developed recurrence in 2024 after she was noted to have a CEA elevated on her outpatient labs at 10.  She underwent CT which demonstrated a large left lung mass.  Biopsy was consistent with metastatic colorectal cancer.  She had further imaging with PET and MRI which showed bilobar liver metastases in addition to her known lung mass.  She was started on palliative chemotherapy with a regimen of FOLFIRI and Bevacizumab 7/31/24 with Dr. Hernandez in [place].  She had some decrease in size of her lung mass with restaging imaging in October.  Her subsequent imaging 12/19/24 showed mild progression in the liver and stable lung mass.  Dr. Hernandez requested CT guided biopsy of a liver met which was performed on 1/13/25 and revealed metastatic colorectal cancer.  Given the concern for mild intrahepatic disease progression, he discussed consideration of changing therapies.  She was interested in seeking a second opinion and presents to [facility] today for discussion.    At present, she denies any issues.  No fatigue, pain, nausea.  She tolerated this past chemotherapy well though did have fatigue when she was on it.  She has no residual neuropathy from her prior oxaliplatin.    Patient presents with her wife and daughter.  ECOG PS is 0.  History has been obtained by chart review and  "discussion with the patient.    ROS:   Review of Systems   Constitutional:  Negative for malaise/fatigue and weight loss.   Gastrointestinal:  Negative for abdominal pain, constipation, diarrhea, nausea and vomiting.       Past Medical History:   Past Medical History:   Diagnosis Date    Anxiety disorder, unspecified     Carpal tunnel syndrome     Malignant neoplasm of rectum     Rectal carcinoma         Past Surgical History:   Past Surgical History:   Procedure Laterality Date    ADENOIDECTOMY  1968    BREAST SURGERY  2010    benign cyst removed    COLON RESECTION      COLON SURGERY  2015    COLONOSCOPY      HYSTERECTOMY  1993    only had one ovary removed, due to endometriosis    removal of right ovary Right     SINUS SURGERY      TONSILLECTOMY  1968        Family History:   Family History   Problem Relation Name Age of Onset    Cancer Mother Clare Snyder         renal    Early death Father Frank Snyder     Cirrhosis Father Frank Snyder     Diabetes Brother Jaiden Snyder     Diabetes Brother Wilbur Snyder         Social History:   Social History     Tobacco Use    Smoking status: Never    Smokeless tobacco: Never   Substance Use Topics    Alcohol use: Not Currently     Alcohol/week: 2.0 standard drinks of alcohol        I have reviewed and updated the patient's past medical, surgical, family and social histories.    Allergies:   Review of patient's allergies indicates:  No Known Allergies     Medications:   Current Outpatient Medications   Medication Sig Dispense Refill    LIDOcaine-prilocaine (EMLA) cream Apply to [facility] site approximately 30 minutes prior to access. 30 g 0    sertraline (ZOLOFT) 100 MG tablet Take 100 mg by mouth once daily.       No current facility-administered medications for this visit.        Physical Exam:   /74 (BP Location: Right arm, Patient Position: Sitting)   Pulse 94   Resp 18   Ht 5' 7" (1.702 m)   Wt 79.9 kg (176 lb 2.4 oz)   SpO2 97%   BMI 27.59 kg/m²            "     Physical Exam  Vitals reviewed.   Constitutional:       General: She is not in acute distress.     Appearance: Normal appearance. She is normal weight.   Eyes:      General: No scleral icterus.     Extraocular Movements: Extraocular movements intact.      Conjunctiva/sclera: Conjunctivae normal.   Cardiovascular:      Rate and Rhythm: Normal rate.   Pulmonary:      Effort: Pulmonary effort is normal. No respiratory distress.   Abdominal:      Tenderness: There is no abdominal tenderness.   Musculoskeletal:         General: No swelling. Normal range of motion.   Skin:     General: Skin is warm.      Coloration: Skin is not jaundiced.      Findings: No erythema or rash.   Neurological:      General: No focal deficit present.      Mental Status: She is alert and oriented to person, place, and time. Mental status is at baseline.      Gait: Gait normal.   Psychiatric:         Mood and Affect: Mood normal.         Behavior: Behavior normal.           Labs:   No results found for this or any previous visit (from the past 48 hours).     Imagin/19/24 - CT CAP:    Impression:     1. Several hepatic lesions are slightly increased in size from previous exam.  2. Left upper lobe mass is not significantly changed from prior.  3. Right adrenal mass is similar to prior.       Path:   25 Liver biopsy:    FINAL DIAGNOSIS      LIVER, CT GUIDED BIOPSY AND CYTOLOGICAL TOUCH PREPS:      METASTATIC CARCINOMA CONSISTENT WITH COLORECTAL PRIMARY SITE.       Assessment:       1. Rectal cancer    2. Secondary malignant neoplasm of liver    3. Malignant neoplasm metastatic to left lung    4. Drug-induced immunodeficiency          Plan:             # Rectal cancer  She was diagnosed with locally advanced (pT2N1b) rectal adenocarcinoma in .  She underwent LAR 3/18/15.  She then had adjuvant chemotherapy with CAPOX x 6 cycles followed by chemoradiation with capecitabine, completed 2015.   Developed recurrence in   after she was noted to have a CEA elevated on her outpatient labs at 10.  She underwent CT which demonstrated a large left lung mass.  Biopsy was consistent with metastatic colorectal cancer.  She had further imaging with PET and MRI which showed bilobar liver metastases in addition to her known lung mass.  She was started on palliative chemotherapy with a regimen of FOLFIRI and Bevacizumab 7/31/24 with Dr. Hernandez in [place].  She had some decrease in size of her lung mass with restaging imaging in October.  Her subsequent imaging 12/19/24 showed mild progression in the liver and stable lung mass.  Dr. Hernandez requested CT guided biopsy of a liver met which was performed on 1/13/25 and revealed metastatic colorectal cancer.    I discussed with her that her liver tumors do look slightly enlarged compared to her October imaging, though her lung metastasis shows stability and some response compared to her initial imaging pre-treatment.  She maintains an excellent performance status so we have a few options here.    What probably makes the most sense is to get her back on FOLFOX + bevacizumab - has no residual neuropathy from her oxaliplatin use in 2015.  I'm not sure of what the utility of placing an CAREN pump in her would be given her large left lung metastasis, but will at least consider this with my colleagues.  Perhaps with plan to irradiate her lung met at some point.  I will discuss her case at our colorectal liver mets tumor board this week and call her with further recommendations.    I don't have a clinical trial available for her at the moment but may if/when she progresses on FOLFOX + joe if that is the direction we go.    Tempus xT: KRAS G12D, APC, TP53, FRANCESCO, TMB 6.3.  HER-2 1+ (negative)      Follow up: PRN     The above information has been reviewed with the patient and all questions have been answered to their apparent satisfaction.  They understand that they can call the clinic with any  questions.    Koffi Lozano MD  Hematology/Oncology  [facility]    Route Chart for Scheduling    Med Onc Chart Routing      Follow up with physician No follow up needed.   Follow up with [facility]    Infusion scheduling note    Injection scheduling note    Labs    Imaging    Pharmacy appointment    Other referrals                  Treatment Plan Information   OP GI bevacizumab + FOLFIRI (irinotecan leucovorin fluororuracil) Q2W Ricardo Hernandez MD   Associated diagnosis: Malignant neoplasm metastatic to right lung   noted on 7/29/2024  Associated diagnosis: Secondary malignancy of left lung   noted on 7/3/2024   Line of treatment: First Line  Treatment Goal: Palliative     Upcoming Treatment Dates - OP GI bevacizumab + FOLFIRI (irinotecan leucovorin fluororuracil) Q2W    12/25/2024       Chemotherapy       atropine injection 0.4 mg       bevacizumab-awwb (MVASI) 5 mg/kg = 400 mg in 0.9% NaCl 100 mL infusion       irinotecan (CAMPTOSAR) 180 mg/m2 = 350 mg in 0.9% NaCl 517.5 mL chemo infusion       leucovorin calcium 400 mg/m2 = 775 mg in D5W 250 mL infusion       fluorouraciL injection 775 mg       fluorouracil (Adrucil) 2,400 mg/m2 = 4,655 mg in 0.9% NaCl 100 mL chemo infusion       Antiemetics       palonosetron 0.25mg/dexAMETHasone 12mg in NS IVPB 0.25 mg 50 mL  1/8/2025       Chemotherapy       atropine injection 0.4 mg       bevacizumab-awwb (MVASI) 5 mg/kg = 400 mg in 0.9% NaCl 100 mL infusion       irinotecan (CAMPTOSAR) 180 mg/m2 = 350 mg in 0.9% NaCl 517.5 mL chemo infusion       leucovorin calcium 400 mg/m2 = 775 mg in D5W 250 mL infusion       fluorouraciL injection 775 mg       fluorouracil (Adrucil) 2,400 mg/m2 = 4,655 mg in 0.9% NaCl 100 mL chemo infusion       Antiemetics       palonosetron 0.25mg/dexAMETHasone 12mg in NS IVPB 0.25 mg 50 mL  1/22/2025       Chemotherapy       atropine injection 0.4 mg       bevacizumab-awwb (MVASI) 5 mg/kg = 400 mg in 0.9% NaCl 100 mL infusion        irinotecan (CAMPTOSAR) 180 mg/m2 = 350 mg in 0.9% NaCl 517.5 mL chemo infusion       leucovorin calcium 400 mg/m2 = 775 mg in D5W 250 mL infusion       fluorouraciL injection 775 mg       fluorouracil (Adrucil) 2,400 mg/m2 = 4,655 mg in 0.9% NaCl 100 mL chemo infusion       Antiemetics       palonosetron 0.25mg/dexAMETHasone 12mg in NS IVPB 0.25 mg 50 mL  2/5/2025       Chemotherapy       atropine injection 0.4 mg       bevacizumab-awwb (MVASI) 5 mg/kg = 400 mg in 0.9% NaCl 100 mL infusion       irinotecan (CAMPTOSAR) 180 mg/m2 = 350 mg in 0.9% NaCl 517.5 mL chemo infusion       leucovorin calcium 400 mg/m2 = 775 mg in D5W 250 mL infusion       fluorouraciL injection 775 mg       fluorouracil (Adrucil) 2,400 mg/m2 = 4,655 mg in 0.9% NaCl 100 mL chemo infusion       Antiemetics       palonosetron 0.25mg/dexAMETHasone 12mg in NS IVPB 0.25 mg 50 mL

## 2025-02-13 ENCOUNTER — DOCUMENTATION ONLY (OUTPATIENT)
Dept: HEMATOLOGY/ONCOLOGY | Facility: CLINIC | Age: 60
End: 2025-02-13
Payer: COMMERCIAL

## 2025-02-13 NOTE — PROGRESS NOTES
[facility]     Colorectal Liver Metastasis Tumor Board Note      Date: 02/13/2025    Referring Provider: Dr. Lozano     Case Overview: Mrs. Snyder (59F) was initially diagnosed in 2015 with rectal cancer S/p LAR, CAPOX x 6 cycles, and chemoRT. Had recurrence in 2024 and started FOLFIRI/Fatou in 7/2024.     Participants: medical oncology, surgical oncology, colorectal surgery, transplant surgeons, interventional radiology, and oncology navigator     Imaging Reviewed: CT    Radiology Review: CT 12/24 compared to 10/24 interval minimal enlargement of multiple lesions within the liver. Similar appearance of lung and adrenal masses. Patient had liver biopsy 1/13.    Orders/Referrals: none    Board Recommendations: Adrenal lesion seen on imaging, stable in size. Recommend transition to FOLFOX. Pending response, may consider locoregional options in the future.

## 2025-02-18 ENCOUNTER — TELEPHONE (OUTPATIENT)
Dept: HEMATOLOGY/ONCOLOGY | Facility: CLINIC | Age: 60
End: 2025-02-18
Payer: COMMERCIAL

## 2025-02-18 NOTE — TELEPHONE ENCOUNTER
Returned call to patient to inform her MD will be calling her this afternoon.  Patient verbalized understanding.

## 2025-02-18 NOTE — TELEPHONE ENCOUNTER
----- Message from Vega sent at 2/18/2025 12:46 PM CST -----  Regarding: Consult/Advisory  Contact: [phone]  Consult/Advisory Name Of Caller: Alondra Snyder  Contact Preference:  [phone] Nature of call: Pt is calling to see if Dr. Lozano spoke with his colleagues about her case and if there is any update.

## 2025-02-19 ENCOUNTER — PATIENT MESSAGE (OUTPATIENT)
Dept: HEMATOLOGY/ONCOLOGY | Facility: CLINIC | Age: 60
End: 2025-02-19
Payer: COMMERCIAL

## 2025-02-20 ENCOUNTER — TELEPHONE (OUTPATIENT)
Dept: HEMATOLOGY/ONCOLOGY | Facility: CLINIC | Age: 60
End: 2025-02-20
Payer: COMMERCIAL

## 2025-02-20 DIAGNOSIS — C20 RECTAL CANCER: Primary | ICD-10-CM

## 2025-02-20 DIAGNOSIS — C78.7 SECONDARY MALIGNANT NEOPLASM OF LIVER: ICD-10-CM

## 2025-02-20 DIAGNOSIS — C78.01 MALIGNANT NEOPLASM METASTATIC TO RIGHT LUNG: ICD-10-CM

## 2025-02-20 RX ORDER — HEPARIN 100 UNIT/ML
500 SYRINGE INTRAVENOUS
OUTPATIENT
Start: 2025-02-24

## 2025-02-20 RX ORDER — EPINEPHRINE 0.3 MG/.3ML
0.3 INJECTION SUBCUTANEOUS ONCE AS NEEDED
OUTPATIENT
Start: 2025-02-24

## 2025-02-20 RX ORDER — FLUOROURACIL 50 MG/ML
400 INJECTION, SOLUTION INTRAVENOUS
OUTPATIENT
Start: 2025-02-24

## 2025-02-20 RX ORDER — SODIUM CHLORIDE 0.9 % (FLUSH) 0.9 %
10 SYRINGE (ML) INJECTION
OUTPATIENT
Start: 2025-02-26

## 2025-02-20 RX ORDER — DIPHENHYDRAMINE HYDROCHLORIDE 50 MG/ML
50 INJECTION INTRAMUSCULAR; INTRAVENOUS ONCE AS NEEDED
OUTPATIENT
Start: 2025-02-24

## 2025-02-20 RX ORDER — SODIUM CHLORIDE 0.9 % (FLUSH) 0.9 %
10 SYRINGE (ML) INJECTION
OUTPATIENT
Start: 2025-02-24

## 2025-02-20 RX ORDER — HEPARIN 100 UNIT/ML
500 SYRINGE INTRAVENOUS
OUTPATIENT
Start: 2025-02-26

## 2025-02-20 RX ORDER — PROCHLORPERAZINE EDISYLATE 5 MG/ML
10 INJECTION INTRAMUSCULAR; INTRAVENOUS ONCE AS NEEDED
OUTPATIENT
Start: 2025-02-24

## 2025-02-20 RX ORDER — PROCHLORPERAZINE EDISYLATE 5 MG/ML
10 INJECTION INTRAMUSCULAR; INTRAVENOUS ONCE AS NEEDED
OUTPATIENT
Start: 2025-02-26

## 2025-02-20 NOTE — TELEPHONE ENCOUNTER
Dr. Hernandez called and spoke with patient and placed orders for new treatment to start Monday. She will need q 2 week labs and TD.

## 2025-02-21 ENCOUNTER — LAB VISIT (OUTPATIENT)
Dept: LAB | Facility: HOSPITAL | Age: 60
End: 2025-02-21
Attending: INTERNAL MEDICINE
Payer: COMMERCIAL

## 2025-02-21 DIAGNOSIS — C78.02 SECONDARY MALIGNANCY OF LEFT LUNG: ICD-10-CM

## 2025-02-21 DIAGNOSIS — C20 RECTAL CANCER: ICD-10-CM

## 2025-02-21 DIAGNOSIS — C78.7 SECONDARY MALIGNANT NEOPLASM OF LIVER: ICD-10-CM

## 2025-02-21 DIAGNOSIS — C78.01 MALIGNANT NEOPLASM METASTATIC TO RIGHT LUNG: Primary | ICD-10-CM

## 2025-02-21 DIAGNOSIS — C78.01 MALIGNANT NEOPLASM METASTATIC TO RIGHT LUNG: ICD-10-CM

## 2025-02-21 LAB
ALBUMIN SERPL-MCNC: 2.4 G/DL (ref 3.4–4.8)
ALBUMIN/GLOB SERPL: 0.5 RATIO (ref 1.1–2)
ALP SERPL-CCNC: 552 UNIT/L (ref 40–150)
ALT SERPL-CCNC: 24 UNIT/L (ref 0–55)
ANION GAP SERPL CALC-SCNC: 9 MEQ/L
AST SERPL-CCNC: 55 UNIT/L (ref 5–34)
BASOPHILS # BLD AUTO: 0.09 X10(3)/MCL
BASOPHILS NFR BLD AUTO: 0.9 %
BILIRUB SERPL-MCNC: 0.5 MG/DL
BUN SERPL-MCNC: 12.1 MG/DL (ref 9.8–20.1)
CALCIUM SERPL-MCNC: 9.2 MG/DL (ref 8.4–10.2)
CHLORIDE SERPL-SCNC: 100 MMOL/L (ref 98–107)
CO2 SERPL-SCNC: 27 MMOL/L (ref 23–31)
CREAT SERPL-MCNC: 0.59 MG/DL (ref 0.55–1.02)
CREAT/UREA NIT SERPL: 21
EOSINOPHIL # BLD AUTO: 0.76 X10(3)/MCL (ref 0–0.9)
EOSINOPHIL NFR BLD AUTO: 7.4 %
ERYTHROCYTE [DISTWIDTH] IN BLOOD BY AUTOMATED COUNT: 14.6 % (ref 11.5–17)
GFR SERPLBLD CREATININE-BSD FMLA CKD-EPI: >60 ML/MIN/1.73/M2
GLOBULIN SER-MCNC: 4.7 GM/DL (ref 2.4–3.5)
GLUCOSE SERPL-MCNC: 88 MG/DL (ref 82–115)
HCT VFR BLD AUTO: 33.9 % (ref 37–47)
HGB BLD-MCNC: 10.7 G/DL (ref 12–16)
IMM GRANULOCYTES # BLD AUTO: 0.03 X10(3)/MCL (ref 0–0.04)
IMM GRANULOCYTES NFR BLD AUTO: 0.3 %
LYMPHOCYTES # BLD AUTO: 1.5 X10(3)/MCL (ref 0.6–4.6)
LYMPHOCYTES NFR BLD AUTO: 14.5 %
MCH RBC QN AUTO: 27.6 PG (ref 27–31)
MCHC RBC AUTO-ENTMCNC: 31.6 G/DL (ref 33–36)
MCV RBC AUTO: 87.6 FL (ref 80–94)
MONOCYTES # BLD AUTO: 1.27 X10(3)/MCL (ref 0.1–1.3)
MONOCYTES NFR BLD AUTO: 12.3 %
NEUTROPHILS # BLD AUTO: 6.67 X10(3)/MCL (ref 2.1–9.2)
NEUTROPHILS NFR BLD AUTO: 64.6 %
PLATELET # BLD AUTO: 678 X10(3)/MCL (ref 130–400)
PMV BLD AUTO: 8 FL (ref 7.4–10.4)
POTASSIUM SERPL-SCNC: 3.9 MMOL/L (ref 3.5–5.1)
PROT SERPL-MCNC: 7.1 GM/DL (ref 5.8–7.6)
RBC # BLD AUTO: 3.87 X10(6)/MCL (ref 4.2–5.4)
SODIUM SERPL-SCNC: 136 MMOL/L (ref 136–145)
WBC # BLD AUTO: 10.32 X10(3)/MCL (ref 4.5–11.5)

## 2025-02-21 PROCEDURE — 36415 COLL VENOUS BLD VENIPUNCTURE: CPT

## 2025-02-21 PROCEDURE — 85025 COMPLETE CBC W/AUTO DIFF WBC: CPT

## 2025-02-21 PROCEDURE — 80053 COMPREHEN METABOLIC PANEL: CPT

## 2025-02-24 ENCOUNTER — LAB VISIT (OUTPATIENT)
Dept: LAB | Facility: HOSPITAL | Age: 60
End: 2025-02-24
Attending: INTERNAL MEDICINE
Payer: COMMERCIAL

## 2025-02-24 ENCOUNTER — INFUSION (OUTPATIENT)
Dept: INFUSION THERAPY | Facility: HOSPITAL | Age: 60
End: 2025-02-24
Attending: INTERNAL MEDICINE
Payer: COMMERCIAL

## 2025-02-24 VITALS
BODY MASS INDEX: 28.14 KG/M2 | DIASTOLIC BLOOD PRESSURE: 69 MMHG | HEIGHT: 67 IN | SYSTOLIC BLOOD PRESSURE: 101 MMHG | WEIGHT: 179.31 LBS | HEART RATE: 71 BPM | OXYGEN SATURATION: 96 % | TEMPERATURE: 98 F | RESPIRATION RATE: 18 BRPM

## 2025-02-24 DIAGNOSIS — R05.9 COUGH, UNSPECIFIED TYPE: Primary | ICD-10-CM

## 2025-02-24 DIAGNOSIS — C78.01 MALIGNANT NEOPLASM METASTATIC TO RIGHT LUNG: ICD-10-CM

## 2025-02-24 DIAGNOSIS — C20 RECTAL CANCER: Primary | ICD-10-CM

## 2025-02-24 DIAGNOSIS — C78.7 SECONDARY MALIGNANT NEOPLASM OF LIVER: ICD-10-CM

## 2025-02-24 DIAGNOSIS — C78.02 SECONDARY MALIGNANCY OF LEFT LUNG: ICD-10-CM

## 2025-02-24 LAB — PROT UR QL STRIP: ABNORMAL

## 2025-02-24 PROCEDURE — 25000003 PHARM REV CODE 250: Performed by: INTERNAL MEDICINE

## 2025-02-24 PROCEDURE — 96413 CHEMO IV INFUSION 1 HR: CPT

## 2025-02-24 PROCEDURE — 96415 CHEMO IV INFUSION ADDL HR: CPT

## 2025-02-24 PROCEDURE — 96416 CHEMO PROLONG INFUSE W/PUMP: CPT

## 2025-02-24 PROCEDURE — 96417 CHEMO IV INFUS EACH ADDL SEQ: CPT

## 2025-02-24 PROCEDURE — 81005 URINALYSIS: CPT

## 2025-02-24 PROCEDURE — 96411 CHEMO IV PUSH ADDL DRUG: CPT

## 2025-02-24 PROCEDURE — 63600175 PHARM REV CODE 636 W HCPCS: Mod: JZ,TB | Performed by: INTERNAL MEDICINE

## 2025-02-24 PROCEDURE — 96368 THER/DIAG CONCURRENT INF: CPT

## 2025-02-24 PROCEDURE — 96375 TX/PRO/DX INJ NEW DRUG ADDON: CPT

## 2025-02-24 PROCEDURE — 96367 TX/PROPH/DG ADDL SEQ IV INF: CPT

## 2025-02-24 RX ORDER — DIPHENHYDRAMINE HYDROCHLORIDE 50 MG/ML
25 INJECTION INTRAMUSCULAR; INTRAVENOUS
Status: COMPLETED | OUTPATIENT
Start: 2025-02-24 | End: 2025-02-24

## 2025-02-24 RX ORDER — HEPARIN 100 UNIT/ML
500 SYRINGE INTRAVENOUS
Status: DISCONTINUED | OUTPATIENT
Start: 2025-02-24 | End: 2025-02-24 | Stop reason: HOSPADM

## 2025-02-24 RX ORDER — DIPHENHYDRAMINE HYDROCHLORIDE 50 MG/ML
50 INJECTION INTRAMUSCULAR; INTRAVENOUS ONCE AS NEEDED
Status: DISCONTINUED | OUTPATIENT
Start: 2025-02-24 | End: 2025-02-24 | Stop reason: HOSPADM

## 2025-02-24 RX ORDER — EPINEPHRINE 0.3 MG/.3ML
0.3 INJECTION SUBCUTANEOUS ONCE AS NEEDED
Status: DISCONTINUED | OUTPATIENT
Start: 2025-02-24 | End: 2025-02-24 | Stop reason: HOSPADM

## 2025-02-24 RX ORDER — HYDROCODONE POLISTIREX AND CHLORPHENIRAMINE POLISTIREX 10; 8 MG/5ML; MG/5ML
5 SUSPENSION, EXTENDED RELEASE ORAL EVERY 12 HOURS PRN
Qty: 115 ML | Refills: 0 | Status: SHIPPED | OUTPATIENT
Start: 2025-02-24

## 2025-02-24 RX ORDER — PROCHLORPERAZINE EDISYLATE 5 MG/ML
10 INJECTION INTRAMUSCULAR; INTRAVENOUS ONCE AS NEEDED
Status: DISCONTINUED | OUTPATIENT
Start: 2025-02-24 | End: 2025-02-24 | Stop reason: HOSPADM

## 2025-02-24 RX ORDER — FLUOROURACIL 50 MG/ML
400 INJECTION, SOLUTION INTRAVENOUS
Status: COMPLETED | OUTPATIENT
Start: 2025-02-24 | End: 2025-02-24

## 2025-02-24 RX ORDER — SODIUM CHLORIDE 0.9 % (FLUSH) 0.9 %
10 SYRINGE (ML) INJECTION
Status: DISCONTINUED | OUTPATIENT
Start: 2025-02-24 | End: 2025-02-24 | Stop reason: HOSPADM

## 2025-02-24 RX ADMIN — SODIUM CHLORIDE 4500 MG: 9 INJECTION, SOLUTION INTRAVENOUS at 12:02

## 2025-02-24 RX ADMIN — DEXTROSE MONOHYDRATE: 12500 INJECTION, SOLUTION INTRAVENOUS at 09:02

## 2025-02-24 RX ADMIN — SODIUM CHLORIDE: 9 INJECTION, SOLUTION INTRAVENOUS at 08:02

## 2025-02-24 RX ADMIN — SODIUM CHLORIDE 400 MG: 9 INJECTION, SOLUTION INTRAVENOUS at 08:02

## 2025-02-24 RX ADMIN — DIPHENHYDRAMINE HYDROCHLORIDE 25 MG: 50 INJECTION INTRAMUSCULAR; INTRAVENOUS at 09:02

## 2025-02-24 RX ADMIN — DEXTROSE MONOHYDRATE 165 MG: 50 INJECTION, SOLUTION INTRAVENOUS at 09:02

## 2025-02-24 RX ADMIN — SODIUM CHLORIDE 0.25 MG: 9 INJECTION, SOLUTION INTRAVENOUS at 09:02

## 2025-02-24 RX ADMIN — DEXTROSE MONOHYDRATE 750 MG: 12500 INJECTION, SOLUTION INTRAVENOUS at 09:02

## 2025-02-24 RX ADMIN — FLUOROURACIL 775 MG: 50 INJECTION, SOLUTION INTRAVENOUS at 12:02

## 2025-02-24 NOTE — PLAN OF CARE
Plan of care reviewed with patient. C1D1 completed. Appts reviewed with patient; patient uses portal.

## 2025-02-26 ENCOUNTER — INFUSION (OUTPATIENT)
Dept: INFUSION THERAPY | Facility: HOSPITAL | Age: 60
End: 2025-02-26
Attending: INTERNAL MEDICINE
Payer: COMMERCIAL

## 2025-02-26 VITALS
WEIGHT: 179.31 LBS | RESPIRATION RATE: 18 BRPM | TEMPERATURE: 99 F | HEIGHT: 67 IN | SYSTOLIC BLOOD PRESSURE: 105 MMHG | BODY MASS INDEX: 28.14 KG/M2 | OXYGEN SATURATION: 96 % | DIASTOLIC BLOOD PRESSURE: 64 MMHG | HEART RATE: 68 BPM

## 2025-02-26 DIAGNOSIS — C78.01 MALIGNANT NEOPLASM METASTATIC TO RIGHT LUNG: ICD-10-CM

## 2025-02-26 DIAGNOSIS — C78.7 SECONDARY MALIGNANT NEOPLASM OF LIVER: ICD-10-CM

## 2025-02-26 DIAGNOSIS — C20 RECTAL CANCER: Primary | ICD-10-CM

## 2025-02-26 PROCEDURE — 25000003 PHARM REV CODE 250: Performed by: INTERNAL MEDICINE

## 2025-02-26 PROCEDURE — 96523 IRRIG DRUG DELIVERY DEVICE: CPT

## 2025-02-26 PROCEDURE — 63600175 PHARM REV CODE 636 W HCPCS: Performed by: INTERNAL MEDICINE

## 2025-02-26 PROCEDURE — A4216 STERILE WATER/SALINE, 10 ML: HCPCS | Performed by: INTERNAL MEDICINE

## 2025-02-26 RX ORDER — SODIUM CHLORIDE 0.9 % (FLUSH) 0.9 %
10 SYRINGE (ML) INJECTION
Status: DISCONTINUED | OUTPATIENT
Start: 2025-02-26 | End: 2025-02-26 | Stop reason: HOSPADM

## 2025-02-26 RX ORDER — PROCHLORPERAZINE EDISYLATE 5 MG/ML
10 INJECTION INTRAMUSCULAR; INTRAVENOUS ONCE AS NEEDED
Status: DISCONTINUED | OUTPATIENT
Start: 2025-02-26 | End: 2025-02-26 | Stop reason: HOSPADM

## 2025-02-26 RX ORDER — HEPARIN 100 UNIT/ML
500 SYRINGE INTRAVENOUS
Status: DISCONTINUED | OUTPATIENT
Start: 2025-02-26 | End: 2025-02-26 | Stop reason: HOSPADM

## 2025-02-26 RX ADMIN — Medication 10 ML: at 10:02

## 2025-02-26 RX ADMIN — HEPARIN 500 UNITS: 100 SYRINGE at 10:02

## 2025-02-26 NOTE — PLAN OF CARE
"Plan of care reviewed with patient. C1D3 completed; patient tolerated C1D1-3 well, only c/o "feeling a little sluggish this morning". Appts reviewed with patient; patient uses portal.  "

## 2025-02-27 ENCOUNTER — PATIENT MESSAGE (OUTPATIENT)
Dept: HEMATOLOGY/ONCOLOGY | Facility: CLINIC | Age: 60
End: 2025-02-27
Payer: COMMERCIAL

## 2025-03-04 NOTE — PROGRESS NOTES
Subjective:       Patient ID: Alondra Snyder is a 59 y.o. female.    Chief Complaint:  I did okay with the treatment    Diagnosis: Metastatic rectal cancer                    Stage IIIA rectal carcinoma 3/15 (T2 N1b M0); 3/28+ nodes     Treatment History  Oxaliplatin/Xeloda x6 completed 8/15  --> Xeloda/XRT completed 11/11/15  FOLFIRI + Bevacizumab x 10 (7/24-12/24)    Current Treatment:  FOLFOX + Bevacizumab s/p 1 cycle (2/24/25)     Clinical History: Patient was referred for a first time screening colonoscopy at the age of 49 by her gynecologist.  She had no abdominal symptoms or complaints, changes in her bowel habits, melena or hematochezia.  Colonoscopy revealed a malignant appearing polyp at the rectosigmoid junction.  Biopsy was positive for adenocarcinoma.  Preoperative CEA level was normal 2 ng/mL.  Chest x-ray was unremarkable.  CT A/P showed a large solid left adnexal mass measuring 4.9 x 6.1 cm.  There was no specific abnormality in the sigmoid colon or rectum and no evidence of metastatic disease.  Pelvic ultrasound confirmed that the lesion was a uterine fibroid.  She underwent a laparoscopic resection with primary reanastomosis 3/18/15.  Final pathology showed a 2 cm moderately differentiated adenocarcinoma invading into but not through the muscularis propria.  3 out of 28 lymph nodes were positive for metastatic involvement. All resection margins were clear.  At the time of surgery, the lesion was delineated to be in the upper rectum. She recovered from her surgery uneventfully.  She completed adjuvant chemotherapy and radiation as documented above. Her Mediport catheter was removed 10/17.    She was lost to follow-up from 9/19 until 6/21/23 due to the COVID-19 pandemic.  She reported no significant problems or health issues in the interim.  She had a screening colonoscopy 1/2/20 that showed a single polyp in the ascending colon which was removed with pathology showing benign polypoid colonic mucosa  with no evidence of adenoma.  Follow-up exam was recommended in 5 years.    Laboratory testing 5/31/24 prior to her annual surveillance visit showed an elevated CEA level of 10.16 ng/mL.  Testing was repeated on 6/6/24 with a level of 9.84 ng/mL.  CT C/A/P 6/10/24 showed a 7.8 cm lobulated soft tissue mass in the left upper lobe extending from the left hilum to the pleural margin.  There was a 1.5 cm right hilar node and small AP window nodes.  There was a stable nodular soft tissue density adjacent to the left uterine margin unchanged from 2019.  CT-guided biopsy 6/17/24 revealed a metastatic adenocarcinoma consistent with colorectal primary.  She had left on a trip to [place] when the results came back.  She arranged to be seen at [facility] while she was in [place].    Workup at [facility]  CT-PET scan 7/12/24:  Large DIANA hypermetabolic mass measuring up to 8.1 cm with central necrosis.  Multiple hypermetabolic hepatic metastases.  MRI abdomen 7/12/24:  At least 7 bipolar hepatic metastases, largest 3.0 cm segment MIGUEL.  MRI brain 7/12/24:  No metastatic disease.    Molecular testing:  HER2 negative.  Pathology [facility]NS for additional molecular studies.    [facility] peripheral blood 8/7/24: +KRAS G12D, FRANCESCO.  Positive mutations of APC, PTEN, TP53 and FBXW7    She was started on palliative chemotherapy with a regimen of FOLFIRI and Bevacizumab 7/31/24.  CT C/A/P 10/21/24: DIANA mass 5.9 x 5.4 cm (previous 6.9 x 7.3 cm), left hilar LN 1.5 x 1.1 cm (previous 1.8 x 1.5 cm).  Multiple hypodense liver metastases, largest lesion left hepatic lobe 4.9 cm.        CT C/A/P 12/19/24:  Stable DIANA mass 5.8 x 5.2 cm.  Bilobar hepatic metastases, several showing slight interval enlargement.  Right adrenal nodule 1.2 cm (previous 1 cm).    CT-guided liver biopsy 1/13/25:  Metastatic adenocarcinoma consistent with colorectal primary (CK20 and CDX2+)  [facility] panel: KRAS G12D, APC and TP53 mutations. FRANCESCO, low TMB.    [facility] 2/13/25:  Minimal disease progression on imaging 10/24 to 12/24 with slight enlargement of multiple liver lesions and stable appearance of the lung and adrenal metastases.  Recommend transitioning to FOLFOX + Bevacizumab.    Interval History  She returns to the office today for a follow-up visit.  She has completed 1 cycle of treatment with FOLFOX and bevacizumab 2/24/25.  Treatment was well tolerated.  She did not have any significant nausea or vomiting.  She had mild fatigue and minimal temperature sensitive neuropathy.  No diarrhea, mucositis or hand-foot syndrome.  She has no new symptoms or complaints today.  Her blood counts are stable.  She is not having any significant abdominal pain.  She reports a good appetite, no weight loss.       Review of Systems   Constitutional:  Negative for appetite change, fatigue, fever and unexpected weight change.   HENT:  Negative for nasal congestion, mouth sores, sore throat and trouble swallowing.    Eyes: Negative.    Respiratory:  Negative for cough, shortness of breath and wheezing.    Cardiovascular:  Negative for chest pain, palpitations and leg swelling.   Gastrointestinal:  Negative for abdominal distention, abdominal pain, constipation, diarrhea and nausea.   Genitourinary:  Negative for dysuria, flank pain and frequency.   Musculoskeletal:  Negative for arthralgias and back pain.   Integumentary:  Negative for pallor and rash.   Neurological:  Negative for dizziness, weakness, numbness and headaches.   Hematological:  Negative for adenopathy. Does not bruise/bleed easily.   Psychiatric/Behavioral: Negative.         PMHx:  Endometriosis, anxiety/depression  PSHx:  Tonsils, sinus surgery, right oophorectomy, partial colectomy  SH:  Lifetime nonsmoker, occasional alcohol use.  Lives in [place] with her significant other.  Works as a .  FH:  Mother had kidney cancer.  A maternal aunt and 1st cousin had breast  "cancer.    Objective:        /81 (Patient Position: Sitting)   Pulse 72   Temp 98.4 °F (36.9 °C)   Resp 15   Ht 5' 7" (1.702 m)   Wt 81.2 kg (179 lb)   SpO2 96%   BMI 28.04 kg/m²    Physical Exam  Constitutional:       Comments: Well-developed white female in NAD   HENT:      Head: Normocephalic.      Mouth/Throat:      Mouth: Mucous membranes are moist.      Pharynx: Oropharynx is clear. No posterior oropharyngeal erythema.   Eyes:      General: No scleral icterus.     Extraocular Movements: Extraocular movements intact.      Pupils: Pupils are equal, round, and reactive to light.   Cardiovascular:      Rate and Rhythm: Normal rate and regular rhythm.      Heart sounds: No murmur heard.     Comments: MediPort catheter right chest wall and MediPort removal incision left chest wall.  Pulmonary:      Comments: Lungs clear to auscultation  Abdominal:      General: Bowel sounds are normal. There is no distension.      Palpations: Abdomen is soft.      Tenderness: There is no abdominal tenderness.      Comments: Well-healed midline incision below umbilicus and laparoscopic sites. No palpable masses or organomegaly.   Musculoskeletal:         General: No swelling or tenderness. Normal range of motion.      Cervical back: Neck supple. No tenderness.   Skin:     General: Skin is warm and dry.      Findings: No rash.   Neurological:      General: No focal deficit present.      Mental Status: She is alert and oriented to person, place, and time.      Cranial Nerves: No cranial nerve deficit.      Motor: No weakness.       ECOG SCORE    0 - Fully active-able to carry on all pre-disease performance without restriction          LABORATORY  Recent Results (from the past week)   CBC with Differential    Collection Time: 03/05/25  9:51 AM   Result Value Ref Range    WBC 7.41 4.50 - 11.50 x10(3)/mcL    RBC 4.30 4.20 - 5.40 x10(6)/mcL    Hgb 11.7 (L) 12.0 - 16.0 g/dL    Hct 37.5 37.0 - 47.0 %    MCV 87.2 80.0 - 94.0 fL "    MCH 27.2 27.0 - 31.0 pg    MCHC 31.2 (L) 33.0 - 36.0 g/dL    RDW 15.7 11.5 - 17.0 %    Platelet 714 (H) 130 - 400 x10(3)/mcL    MPV 7.6 7.4 - 10.4 fL    Neut % 46.9 %    Lymph % 28.7 %    Mono % 14.4 %    Eos % 8.6 %    Basophil % 0.9 %    Imm Grans % 0.5 %    Neut # 3.46 2.1 - 9.2 x10(3)/mcL    Lymph # 2.13 0.6 - 4.6 x10(3)/mcL    Mono # 1.07 0.1 - 1.3 x10(3)/mcL    Eos # 0.64 0 - 0.9 x10(3)/mcL    Baso # 0.07 <=0.2 x10(3)/mcL    Imm Gran # 0.04 0.00 - 0.04 x10(3)/mcL                      8/26/24 9/18/24 9/23/24 11/13/24 12/23/24  CEA        23.9        17.1        16.0         11.8          11.7    Assessment:   Metastatic rectal cancer      Plan:   Patient has completed 1 cycle of treatment with FOLFOX and bevacizumab with minimal toxicity.  She will proceed with her 2nd cycle of therapy scheduled for 3/10/25 at full dose.  RTC in 2 weeks for a follow-up office visit and clinical assessment.  Consider follow-up imaging after 4-5 cycles of therapy.      TIM HARDY MD     Other Physicians  Dr. Jaimee Lozano

## 2025-03-05 ENCOUNTER — OFFICE VISIT (OUTPATIENT)
Dept: HEMATOLOGY/ONCOLOGY | Facility: CLINIC | Age: 60
End: 2025-03-05
Payer: COMMERCIAL

## 2025-03-05 ENCOUNTER — LAB VISIT (OUTPATIENT)
Dept: LAB | Facility: HOSPITAL | Age: 60
End: 2025-03-05
Attending: INTERNAL MEDICINE
Payer: COMMERCIAL

## 2025-03-05 VITALS
TEMPERATURE: 98 F | DIASTOLIC BLOOD PRESSURE: 81 MMHG | OXYGEN SATURATION: 96 % | HEART RATE: 72 BPM | SYSTOLIC BLOOD PRESSURE: 121 MMHG | WEIGHT: 179 LBS | HEIGHT: 67 IN | BODY MASS INDEX: 28.09 KG/M2 | RESPIRATION RATE: 15 BRPM

## 2025-03-05 DIAGNOSIS — C20 RECTAL CANCER: ICD-10-CM

## 2025-03-05 DIAGNOSIS — C78.01 MALIGNANT NEOPLASM METASTATIC TO RIGHT LUNG: ICD-10-CM

## 2025-03-05 DIAGNOSIS — C78.7 SECONDARY MALIGNANT NEOPLASM OF LIVER: ICD-10-CM

## 2025-03-05 DIAGNOSIS — C20 RECTAL CANCER: Primary | ICD-10-CM

## 2025-03-05 LAB
ALBUMIN SERPL-MCNC: 2.6 G/DL (ref 3.4–4.8)
ALBUMIN/GLOB SERPL: 0.6 RATIO (ref 1.1–2)
ALP SERPL-CCNC: 459 UNIT/L (ref 40–150)
ALT SERPL-CCNC: 22 UNIT/L (ref 0–55)
ANION GAP SERPL CALC-SCNC: 14 MEQ/L
AST SERPL-CCNC: 48 UNIT/L (ref 5–34)
BACTERIA #/AREA URNS AUTO: ABNORMAL /HPF
BASOPHILS # BLD AUTO: 0.07 X10(3)/MCL
BASOPHILS NFR BLD AUTO: 0.9 %
BILIRUB SERPL-MCNC: 0.3 MG/DL
BILIRUB UR QL STRIP.AUTO: NEGATIVE
BUN SERPL-MCNC: 6.4 MG/DL (ref 9.8–20.1)
CALCIUM SERPL-MCNC: 9.4 MG/DL (ref 8.4–10.2)
CHLORIDE SERPL-SCNC: 100 MMOL/L (ref 98–107)
CLARITY UR: CLEAR
CO2 SERPL-SCNC: 28 MMOL/L (ref 23–31)
COLOR UR AUTO: YELLOW
CREAT SERPL-MCNC: 0.59 MG/DL (ref 0.55–1.02)
CREAT/UREA NIT SERPL: 11
EOSINOPHIL # BLD AUTO: 0.64 X10(3)/MCL (ref 0–0.9)
EOSINOPHIL NFR BLD AUTO: 8.6 %
ERYTHROCYTE [DISTWIDTH] IN BLOOD BY AUTOMATED COUNT: 15.7 % (ref 11.5–17)
GFR SERPLBLD CREATININE-BSD FMLA CKD-EPI: >60 ML/MIN/1.73/M2
GLOBULIN SER-MCNC: 4.6 GM/DL (ref 2.4–3.5)
GLUCOSE SERPL-MCNC: 80 MG/DL (ref 82–115)
GLUCOSE UR QL STRIP: NORMAL
HCT VFR BLD AUTO: 37.5 % (ref 37–47)
HGB BLD-MCNC: 11.7 G/DL (ref 12–16)
HGB UR QL STRIP: ABNORMAL
IMM GRANULOCYTES # BLD AUTO: 0.04 X10(3)/MCL (ref 0–0.04)
IMM GRANULOCYTES NFR BLD AUTO: 0.5 %
KETONES UR QL STRIP: NEGATIVE
LEUKOCYTE ESTERASE UR QL STRIP: 25
LYMPHOCYTES # BLD AUTO: 2.13 X10(3)/MCL (ref 0.6–4.6)
LYMPHOCYTES NFR BLD AUTO: 28.7 %
MCH RBC QN AUTO: 27.2 PG (ref 27–31)
MCHC RBC AUTO-ENTMCNC: 31.2 G/DL (ref 33–36)
MCV RBC AUTO: 87.2 FL (ref 80–94)
MONOCYTES # BLD AUTO: 1.07 X10(3)/MCL (ref 0.1–1.3)
MONOCYTES NFR BLD AUTO: 14.4 %
MUCOUS THREADS URNS QL MICRO: ABNORMAL /LPF
NEUTROPHILS # BLD AUTO: 3.46 X10(3)/MCL (ref 2.1–9.2)
NEUTROPHILS NFR BLD AUTO: 46.9 %
NITRITE UR QL STRIP: NEGATIVE
PH UR STRIP: 6.5 [PH]
PLATELET # BLD AUTO: 714 X10(3)/MCL (ref 130–400)
PMV BLD AUTO: 7.6 FL (ref 7.4–10.4)
POTASSIUM SERPL-SCNC: 3.1 MMOL/L (ref 3.5–5.1)
PROT SERPL-MCNC: 7.2 GM/DL (ref 5.8–7.6)
PROT UR QL STRIP: ABNORMAL
RBC # BLD AUTO: 4.3 X10(6)/MCL (ref 4.2–5.4)
RBC #/AREA URNS AUTO: ABNORMAL /HPF
SODIUM SERPL-SCNC: 142 MMOL/L (ref 136–145)
SP GR UR STRIP.AUTO: 1.01 (ref 1–1.03)
SQUAMOUS #/AREA URNS LPF: ABNORMAL /HPF
UROBILINOGEN UR STRIP-ACNC: NORMAL
WBC # BLD AUTO: 7.41 X10(3)/MCL (ref 4.5–11.5)
WBC #/AREA URNS AUTO: ABNORMAL /HPF

## 2025-03-05 PROCEDURE — 85025 COMPLETE CBC W/AUTO DIFF WBC: CPT

## 2025-03-05 PROCEDURE — 3074F SYST BP LT 130 MM HG: CPT | Mod: CPTII,S$GLB,, | Performed by: INTERNAL MEDICINE

## 2025-03-05 PROCEDURE — 3079F DIAST BP 80-89 MM HG: CPT | Mod: CPTII,S$GLB,, | Performed by: INTERNAL MEDICINE

## 2025-03-05 PROCEDURE — 80053 COMPREHEN METABOLIC PANEL: CPT

## 2025-03-05 PROCEDURE — 3008F BODY MASS INDEX DOCD: CPT | Mod: CPTII,S$GLB,, | Performed by: INTERNAL MEDICINE

## 2025-03-05 PROCEDURE — 36415 COLL VENOUS BLD VENIPUNCTURE: CPT

## 2025-03-05 PROCEDURE — 1159F MED LIST DOCD IN RCRD: CPT | Mod: CPTII,S$GLB,, | Performed by: INTERNAL MEDICINE

## 2025-03-05 PROCEDURE — 99215 OFFICE O/P EST HI 40 MIN: CPT | Mod: S$GLB,,, | Performed by: INTERNAL MEDICINE

## 2025-03-05 PROCEDURE — 1160F RVW MEDS BY RX/DR IN RCRD: CPT | Mod: CPTII,S$GLB,, | Performed by: INTERNAL MEDICINE

## 2025-03-05 PROCEDURE — 99999 PR PBB SHADOW E&M-EST. PATIENT-LVL III: CPT | Mod: PBBFAC,,, | Performed by: INTERNAL MEDICINE

## 2025-03-05 PROCEDURE — 81001 URINALYSIS AUTO W/SCOPE: CPT

## 2025-03-06 NOTE — PROGRESS NOTES
Subjective:       Patient ID: Alondra Snyder is a 59 y.o. female.    Chief Complaint:  Doing well with treatment    Diagnosis: Metastatic rectal cancer                    Stage IIIA rectal carcinoma 3/15 (T2 N1b M0); 3/28+ nodes     Treatment History  Oxaliplatin/Xeloda x6 completed 8/15  --> Xeloda/XRT completed 11/11/15  FOLFIRI + Bevacizumab x 10 (7/24-12/24)    Current Treatment:  FOLFOX + Bevacizumab s/p 2 cycles (started 2/24/25)     Clinical History: Patient was referred for a first time screening colonoscopy at the age of 49 by her gynecologist.  She had no abdominal symptoms or complaints, changes in her bowel habits, melena or hematochezia.  Colonoscopy revealed a malignant appearing polyp at the rectosigmoid junction.  Biopsy was positive for adenocarcinoma.  Preoperative CEA level was normal 2 ng/mL.  Chest x-ray was unremarkable.  CT A/P showed a large solid left adnexal mass measuring 4.9 x 6.1 cm.  There was no specific abnormality in the sigmoid colon or rectum and no evidence of metastatic disease.  Pelvic ultrasound confirmed that the lesion was a uterine fibroid.  She underwent a laparoscopic resection with primary reanastomosis 3/18/15.  Final pathology showed a 2 cm moderately differentiated adenocarcinoma invading into but not through the muscularis propria.  3 out of 28 lymph nodes were positive for metastatic involvement. All resection margins were clear.  At the time of surgery, the lesion was delineated to be in the upper rectum. She recovered from her surgery uneventfully.  She completed adjuvant chemotherapy and radiation as documented above. Her Mediport catheter was removed 10/17.    She was lost to follow-up from 9/19 until 6/21/23 due to the COVID-19 pandemic.  She reported no significant problems or health issues in the interim.  She had a screening colonoscopy 1/2/20 that showed a single polyp in the ascending colon which was removed with pathology showing benign polypoid colonic  Unable to leave a message. Mailbox is full mucosa with no evidence of adenoma.  Follow-up exam was recommended in 5 years.    Laboratory testing 5/31/24 prior to her annual surveillance visit showed an elevated CEA level of 10.16 ng/mL.  Testing was repeated on 6/6/24 with a level of 9.84 ng/mL.  CT C/A/P 6/10/24 showed a 7.8 cm lobulated soft tissue mass in the left upper lobe extending from the left hilum to the pleural margin.  There was a 1.5 cm right hilar node and small AP window nodes.  There was a stable nodular soft tissue density adjacent to the left uterine margin unchanged from 2019.  CT-guided biopsy 6/17/24 revealed a metastatic adenocarcinoma consistent with colorectal primary.  She had left on a trip to [place] when the results came back.  She arranged to be seen at [facility] while she was in [place].    Workup at [facility]  CT-PET scan 7/12/24:  Large DIANA hypermetabolic mass measuring up to 8.1 cm with central necrosis.  Multiple hypermetabolic hepatic metastases.  MRI abdomen 7/12/24:  At least 7 bipolar hepatic metastases, largest 3.0 cm segment MIGUEL.  MRI brain 7/12/24:  No metastatic disease.    Molecular testing:  HER2 negative.  Pathology [facility]NS for additional molecular studies.    [facility] peripheral blood 8/7/24: +KRAS G12D, FRANCESCO.  Positive mutations of APC, PTEN, TP53 and FBXW7    She was started on palliative chemotherapy with a regimen of FOLFIRI and Bevacizumab 7/31/24.  CT C/A/P 10/21/24: DIANA mass 5.9 x 5.4 cm (previous 6.9 x 7.3 cm), left hilar LN 1.5 x 1.1 cm (previous 1.8 x 1.5 cm).  Multiple hypodense liver metastases, largest lesion left hepatic lobe 4.9 cm.        CT C/A/P 12/19/24:  Stable DAINA mass 5.8 x 5.2 cm.  Bilobar hepatic metastases, several showing slight interval enlargement.  Right adrenal nodule 1.2 cm (previous 1 cm).    CT-guided liver biopsy 1/13/25:  Metastatic adenocarcinoma consistent with colorectal primary (CK20 and CDX2+)    [facility] 2/13/25:  Minimal disease  progression on imaging 10/24 to 12/24 with slight enlargement of multiple liver lesions and stable appearance of the lung and adrenal metastases.  Recommend transitioning to FOLFOX + Bevacizumab.    Interval History  She returns to clinic today by herself for a 2 week follow-up visit.  She has now completed 2 cycles of therapy with FOLFOX and Bevacizumab.  Treatment has been well-tolerated overall.  No significant nausea or vomiting.  She had transient temperature sensitive neuropathy.  No diarrhea, mucositis or hand-foot syndrome.  She has mild hypertension which did not occur with prior therapy.  She is monitoring her blood pressure at home as well.  She has no abdominal symptoms or complaints.  Appetite is good, no weight loss.       Review of Systems   Constitutional:  Negative for appetite change, fatigue, fever and unexpected weight change.   HENT:  Negative for nasal congestion, mouth sores, sore throat and trouble swallowing.    Eyes: Negative.    Respiratory:  Negative for cough, shortness of breath and wheezing.    Cardiovascular:  Negative for chest pain, palpitations and leg swelling.   Gastrointestinal:  Negative for abdominal distention, abdominal pain, constipation, diarrhea and nausea.   Genitourinary:  Negative for dysuria, flank pain and frequency.   Musculoskeletal:  Negative for arthralgias and back pain.   Integumentary:  Negative for pallor and rash.   Neurological:  Negative for dizziness, weakness, numbness and headaches.   Hematological:  Negative for adenopathy. Does not bruise/bleed easily.   Psychiatric/Behavioral: Negative.         PMHx:  Endometriosis, anxiety/depression  PSHx:  Tonsils, sinus surgery, right oophorectomy, partial colectomy  SH:  Lifetime nonsmoker, occasional alcohol use.  Lives in [place] with her significant other.  Works as a .  FH:  Mother had kidney cancer.  A maternal aunt and 1st cousin had breast cancer.    Objective:        BP (!)  "158/94 (Patient Position: Sitting)   Pulse 70   Temp 98.3 °F (36.8 °C)   Resp 15   Ht 5' 7" (1.702 m)   Wt 77.7 kg (171 lb 6.4 oz)   SpO2 98%   BMI 26.85 kg/m²    Physical Exam  Constitutional:       Comments: Well-developed white female in NAD   HENT:      Head: Normocephalic.      Mouth/Throat:      Mouth: Mucous membranes are moist.      Pharynx: Oropharynx is clear. No posterior oropharyngeal erythema.   Eyes:      General: No scleral icterus.     Extraocular Movements: Extraocular movements intact.      Pupils: Pupils are equal, round, and reactive to light.   Cardiovascular:      Rate and Rhythm: Normal rate and regular rhythm.      Heart sounds: No murmur heard.     Comments: MediPort catheter right chest wall and MediPort removal incision left chest wall.  Pulmonary:      Comments: Lungs clear to auscultation  Abdominal:      General: Bowel sounds are normal. There is no distension.      Palpations: Abdomen is soft.      Tenderness: There is no abdominal tenderness.      Comments: Well-healed midline incision below umbilicus and laparoscopic sites. No palpable masses or organomegaly.   Musculoskeletal:         General: No swelling or tenderness. Normal range of motion.      Cervical back: Neck supple. No tenderness.   Skin:     General: Skin is warm and dry.      Findings: No rash.   Neurological:      General: No focal deficit present.      Mental Status: She is alert and oriented to person, place, and time.      Cranial Nerves: No cranial nerve deficit.      Motor: No weakness.       ECOG SCORE    0 - Fully active-able to carry on all pre-disease performance without restriction          LABORATORY  Recent Results (from the past week)   CEA    Collection Time: 03/18/25 11:31 AM   Result Value Ref Range    Carcinoembryonic Antigen 52.03 (H) 0.00 - 3.00 ng/mL   Comprehensive Metabolic Panel    Collection Time: 03/18/25 11:31 AM   Result Value Ref Range    Sodium 138 136 - 145 mmol/L    Potassium 3.4 " (L) 3.5 - 5.1 mmol/L    Chloride 100 98 - 107 mmol/L    CO2 29 23 - 31 mmol/L    Glucose 94 82 - 115 mg/dL    Blood Urea Nitrogen 10.4 9.8 - 20.1 mg/dL    Creatinine 0.62 0.55 - 1.02 mg/dL    Calcium 9.9 8.4 - 10.2 mg/dL    Protein Total 8.1 (H) 5.8 - 7.6 gm/dL    Albumin 3.2 (L) 3.4 - 4.8 g/dL    Globulin 4.9 (H) 2.4 - 3.5 gm/dL    Albumin/Globulin Ratio 0.7 (L) 1.1 - 2.0 ratio    Bilirubin Total 0.4 <=1.5 mg/dL     (H) 40 - 150 unit/L    ALT 18 0 - 55 unit/L    AST 30 5 - 34 unit/L    eGFR >60 mL/min/1.73/m2    Anion Gap 9.0 mEq/L    BUN/Creatinine Ratio 17    Protein,UA (Dipstick)    Collection Time: 03/18/25 11:31 AM   Result Value Ref Range    Protein, UA Trace (A) Negative   CBC with Differential    Collection Time: 03/18/25 11:31 AM   Result Value Ref Range    WBC 6.83 4.50 - 11.50 x10(3)/mcL    RBC 4.72 4.20 - 5.40 x10(6)/mcL    Hgb 12.7 12.0 - 16.0 g/dL    Hct 40.3 37.0 - 47.0 %    MCV 85.4 80.0 - 94.0 fL    MCH 26.9 (L) 27.0 - 31.0 pg    MCHC 31.5 (L) 33.0 - 36.0 g/dL    RDW 17.1 (H) 11.5 - 17.0 %    Platelet 532 (H) 130 - 400 x10(3)/mcL    MPV 7.7 7.4 - 10.4 fL    Neut % 40.9 %    Lymph % 35.9 %    Mono % 14.1 %    Eos % 6.3 %    Basophil % 1.2 %    Imm Grans % 1.6 %    Neut # 2.80 2.1 - 9.2 x10(3)/mcL    Lymph # 2.45 0.6 - 4.6 x10(3)/mcL    Mono # 0.96 0.1 - 1.3 x10(3)/mcL    Eos # 0.43 0 - 0.9 x10(3)/mcL    Baso # 0.08 <=0.2 x10(3)/mcL    Imm Gran # 0.11 (H) 0.00 - 0.04 x10(3)/mcL                    8/26/24   9/18/24   9/23/24   11/13/24   12/23/24   3/18/25  CEA        23.9        17.1        16.0         11.8          11.7         52.0    Assessment:   Metastatic rectal cancer  Mild treatment-related hypertension      Plan:   Serum CEA level shows an interval increase with no associated symptoms.  She will proceed with her 3rd cycle of therapy on 3/24/25 at full dose.  Continue to monitor blood pressure.  If it stays elevated, may need additional treatment.  RTC in 2 weeks for a follow-up visit  prior to her 4th cycle of therapy.  Follow-up imaging to be performed after 4-5 cycles of treatment.      TIM HARDY MD     Other Physicians  Dr. Jaimee Lozano

## 2025-03-07 RX ORDER — HEPARIN 100 UNIT/ML
500 SYRINGE INTRAVENOUS
OUTPATIENT
Start: 2025-03-12

## 2025-03-07 RX ORDER — PROCHLORPERAZINE EDISYLATE 5 MG/ML
10 INJECTION INTRAMUSCULAR; INTRAVENOUS ONCE AS NEEDED
OUTPATIENT
Start: 2025-03-12

## 2025-03-07 RX ORDER — SODIUM CHLORIDE 0.9 % (FLUSH) 0.9 %
10 SYRINGE (ML) INJECTION
OUTPATIENT
Start: 2025-03-10

## 2025-03-07 RX ORDER — DIPHENHYDRAMINE HYDROCHLORIDE 50 MG/ML
50 INJECTION INTRAMUSCULAR; INTRAVENOUS ONCE AS NEEDED
OUTPATIENT
Start: 2025-03-10

## 2025-03-07 RX ORDER — SODIUM CHLORIDE 0.9 % (FLUSH) 0.9 %
10 SYRINGE (ML) INJECTION
OUTPATIENT
Start: 2025-03-12

## 2025-03-07 RX ORDER — HEPARIN 100 UNIT/ML
500 SYRINGE INTRAVENOUS
OUTPATIENT
Start: 2025-03-10

## 2025-03-07 RX ORDER — EPINEPHRINE 0.3 MG/.3ML
0.3 INJECTION SUBCUTANEOUS ONCE AS NEEDED
OUTPATIENT
Start: 2025-03-10

## 2025-03-07 RX ORDER — PROCHLORPERAZINE EDISYLATE 5 MG/ML
10 INJECTION INTRAMUSCULAR; INTRAVENOUS ONCE AS NEEDED
OUTPATIENT
Start: 2025-03-10

## 2025-03-07 RX ORDER — DIPHENHYDRAMINE HYDROCHLORIDE 50 MG/ML
25 INJECTION INTRAMUSCULAR; INTRAVENOUS
Start: 2025-03-10

## 2025-03-07 RX ORDER — FLUOROURACIL 50 MG/ML
400 INJECTION, SOLUTION INTRAVENOUS
OUTPATIENT
Start: 2025-03-10

## 2025-03-10 ENCOUNTER — INFUSION (OUTPATIENT)
Dept: INFUSION THERAPY | Facility: HOSPITAL | Age: 60
End: 2025-03-10
Attending: INTERNAL MEDICINE
Payer: COMMERCIAL

## 2025-03-10 VITALS
OXYGEN SATURATION: 98 % | SYSTOLIC BLOOD PRESSURE: 138 MMHG | HEIGHT: 67 IN | WEIGHT: 176 LBS | DIASTOLIC BLOOD PRESSURE: 81 MMHG | RESPIRATION RATE: 16 BRPM | HEART RATE: 60 BPM | TEMPERATURE: 98 F | BODY MASS INDEX: 27.62 KG/M2

## 2025-03-10 DIAGNOSIS — C20 RECTAL CANCER: Primary | ICD-10-CM

## 2025-03-10 DIAGNOSIS — C78.01 MALIGNANT NEOPLASM METASTATIC TO RIGHT LUNG: ICD-10-CM

## 2025-03-10 DIAGNOSIS — C78.7 SECONDARY MALIGNANT NEOPLASM OF LIVER: ICD-10-CM

## 2025-03-10 PROCEDURE — 96416 CHEMO PROLONG INFUSE W/PUMP: CPT

## 2025-03-10 PROCEDURE — 96415 CHEMO IV INFUSION ADDL HR: CPT

## 2025-03-10 PROCEDURE — 25000003 PHARM REV CODE 250: Performed by: INTERNAL MEDICINE

## 2025-03-10 PROCEDURE — 96375 TX/PRO/DX INJ NEW DRUG ADDON: CPT

## 2025-03-10 PROCEDURE — 96368 THER/DIAG CONCURRENT INF: CPT

## 2025-03-10 PROCEDURE — 96411 CHEMO IV PUSH ADDL DRUG: CPT

## 2025-03-10 PROCEDURE — 96367 TX/PROPH/DG ADDL SEQ IV INF: CPT

## 2025-03-10 PROCEDURE — 63600175 PHARM REV CODE 636 W HCPCS: Performed by: INTERNAL MEDICINE

## 2025-03-10 PROCEDURE — 96417 CHEMO IV INFUS EACH ADDL SEQ: CPT

## 2025-03-10 PROCEDURE — 96413 CHEMO IV INFUSION 1 HR: CPT

## 2025-03-10 RX ORDER — DIPHENHYDRAMINE HYDROCHLORIDE 50 MG/ML
50 INJECTION, SOLUTION INTRAMUSCULAR; INTRAVENOUS ONCE AS NEEDED
Status: DISCONTINUED | OUTPATIENT
Start: 2025-03-10 | End: 2025-03-10 | Stop reason: HOSPADM

## 2025-03-10 RX ORDER — EPINEPHRINE 0.3 MG/.3ML
0.3 INJECTION SUBCUTANEOUS ONCE AS NEEDED
Status: DISCONTINUED | OUTPATIENT
Start: 2025-03-10 | End: 2025-03-10 | Stop reason: HOSPADM

## 2025-03-10 RX ORDER — FLUOROURACIL 50 MG/ML
400 INJECTION, SOLUTION INTRAVENOUS
Status: COMPLETED | OUTPATIENT
Start: 2025-03-10 | End: 2025-03-10

## 2025-03-10 RX ORDER — SODIUM CHLORIDE 0.9 % (FLUSH) 0.9 %
10 SYRINGE (ML) INJECTION
Status: DISCONTINUED | OUTPATIENT
Start: 2025-03-10 | End: 2025-03-10 | Stop reason: HOSPADM

## 2025-03-10 RX ORDER — PROCHLORPERAZINE EDISYLATE 5 MG/ML
10 INJECTION INTRAMUSCULAR; INTRAVENOUS ONCE AS NEEDED
Status: DISCONTINUED | OUTPATIENT
Start: 2025-03-10 | End: 2025-03-10 | Stop reason: HOSPADM

## 2025-03-10 RX ORDER — HEPARIN 100 UNIT/ML
500 SYRINGE INTRAVENOUS
Status: DISCONTINUED | OUTPATIENT
Start: 2025-03-10 | End: 2025-03-10 | Stop reason: HOSPADM

## 2025-03-10 RX ORDER — DIPHENHYDRAMINE HYDROCHLORIDE 50 MG/ML
25 INJECTION, SOLUTION INTRAMUSCULAR; INTRAVENOUS
Status: COMPLETED | OUTPATIENT
Start: 2025-03-10 | End: 2025-03-10

## 2025-03-10 RX ADMIN — SODIUM CHLORIDE 0.25 MG: 9 INJECTION, SOLUTION INTRAVENOUS at 09:03

## 2025-03-10 RX ADMIN — SODIUM CHLORIDE 400 MG: 9 INJECTION, SOLUTION INTRAVENOUS at 08:03

## 2025-03-10 RX ADMIN — SODIUM CHLORIDE: 9 INJECTION, SOLUTION INTRAVENOUS at 08:03

## 2025-03-10 RX ADMIN — DIPHENHYDRAMINE HYDROCHLORIDE 25 MG: 50 INJECTION INTRAMUSCULAR; INTRAVENOUS at 09:03

## 2025-03-10 RX ADMIN — FLUOROURACIL 775 MG: 50 INJECTION, SOLUTION INTRAVENOUS at 12:03

## 2025-03-10 RX ADMIN — OXALIPLATIN 165 MG: 5 INJECTION, SOLUTION INTRAVENOUS at 10:03

## 2025-03-10 RX ADMIN — DEXTROSE MONOHYDRATE: 50 INJECTION, SOLUTION INTRAVENOUS at 09:03

## 2025-03-10 RX ADMIN — DEXTROSE MONOHYDRATE 750 MG: 12500 INJECTION, SOLUTION INTRAVENOUS at 10:03

## 2025-03-10 RX ADMIN — FLUOROURACIL 4500 MG: 50 INJECTION, SOLUTION INTRAVENOUS at 12:03

## 2025-03-10 NOTE — PLAN OF CARE
C2D1 Zirabev + Folfox given; cadd pump connected and infusing; tolerated well; next appointments confirmed with patient; discharged home in stable condition.

## 2025-03-12 ENCOUNTER — INFUSION (OUTPATIENT)
Dept: INFUSION THERAPY | Facility: HOSPITAL | Age: 60
End: 2025-03-12
Attending: INTERNAL MEDICINE
Payer: COMMERCIAL

## 2025-03-12 VITALS
HEART RATE: 67 BPM | SYSTOLIC BLOOD PRESSURE: 155 MMHG | RESPIRATION RATE: 16 BRPM | DIASTOLIC BLOOD PRESSURE: 84 MMHG | OXYGEN SATURATION: 97 % | TEMPERATURE: 98 F

## 2025-03-12 DIAGNOSIS — C78.7 SECONDARY MALIGNANT NEOPLASM OF LIVER: ICD-10-CM

## 2025-03-12 DIAGNOSIS — C78.01 MALIGNANT NEOPLASM METASTATIC TO RIGHT LUNG: ICD-10-CM

## 2025-03-12 DIAGNOSIS — C20 RECTAL CANCER: Primary | ICD-10-CM

## 2025-03-12 PROCEDURE — A4216 STERILE WATER/SALINE, 10 ML: HCPCS | Performed by: INTERNAL MEDICINE

## 2025-03-12 PROCEDURE — 63600175 PHARM REV CODE 636 W HCPCS: Performed by: INTERNAL MEDICINE

## 2025-03-12 PROCEDURE — 25000003 PHARM REV CODE 250: Performed by: INTERNAL MEDICINE

## 2025-03-12 RX ORDER — HEPARIN 100 UNIT/ML
500 SYRINGE INTRAVENOUS
Status: DISCONTINUED | OUTPATIENT
Start: 2025-03-12 | End: 2025-03-12 | Stop reason: HOSPADM

## 2025-03-12 RX ORDER — PROCHLORPERAZINE EDISYLATE 5 MG/ML
10 INJECTION INTRAMUSCULAR; INTRAVENOUS ONCE AS NEEDED
Status: DISCONTINUED | OUTPATIENT
Start: 2025-03-12 | End: 2025-03-12 | Stop reason: HOSPADM

## 2025-03-12 RX ORDER — SODIUM CHLORIDE 0.9 % (FLUSH) 0.9 %
10 SYRINGE (ML) INJECTION
Status: DISCONTINUED | OUTPATIENT
Start: 2025-03-12 | End: 2025-03-12 | Stop reason: HOSPADM

## 2025-03-12 RX ADMIN — HEPARIN 500 UNITS: 100 SYRINGE at 10:03

## 2025-03-12 RX ADMIN — Medication 10 ML: at 10:03

## 2025-03-12 NOTE — PLAN OF CARE
C2D3 cadd pump disconnected (all medicine was infused); tolerated well; next appointments confirmed with patient; discharged home in stable condition.

## 2025-03-17 ENCOUNTER — PATIENT MESSAGE (OUTPATIENT)
Dept: HEMATOLOGY/ONCOLOGY | Facility: CLINIC | Age: 60
End: 2025-03-17
Payer: COMMERCIAL

## 2025-03-18 ENCOUNTER — OFFICE VISIT (OUTPATIENT)
Dept: HEMATOLOGY/ONCOLOGY | Facility: CLINIC | Age: 60
End: 2025-03-18
Payer: COMMERCIAL

## 2025-03-18 ENCOUNTER — LAB VISIT (OUTPATIENT)
Dept: LAB | Facility: HOSPITAL | Age: 60
End: 2025-03-18
Attending: INTERNAL MEDICINE
Payer: COMMERCIAL

## 2025-03-18 VITALS
RESPIRATION RATE: 15 BRPM | DIASTOLIC BLOOD PRESSURE: 94 MMHG | TEMPERATURE: 98 F | SYSTOLIC BLOOD PRESSURE: 158 MMHG | BODY MASS INDEX: 26.9 KG/M2 | HEIGHT: 67 IN | OXYGEN SATURATION: 98 % | HEART RATE: 70 BPM | WEIGHT: 171.38 LBS

## 2025-03-18 DIAGNOSIS — C20 RECTAL CANCER: ICD-10-CM

## 2025-03-18 DIAGNOSIS — C78.02 MALIGNANT NEOPLASM METASTATIC TO LEFT LUNG: ICD-10-CM

## 2025-03-18 DIAGNOSIS — C78.7 SECONDARY MALIGNANT NEOPLASM OF LIVER: ICD-10-CM

## 2025-03-18 DIAGNOSIS — C20 RECTAL CANCER: Primary | ICD-10-CM

## 2025-03-18 DIAGNOSIS — C78.02 SECONDARY MALIGNANCY OF LEFT LUNG: ICD-10-CM

## 2025-03-18 DIAGNOSIS — C78.01 MALIGNANT NEOPLASM METASTATIC TO RIGHT LUNG: ICD-10-CM

## 2025-03-18 DIAGNOSIS — D84.821 DRUG-INDUCED IMMUNODEFICIENCY: ICD-10-CM

## 2025-03-18 DIAGNOSIS — Z79.899 DRUG-INDUCED IMMUNODEFICIENCY: ICD-10-CM

## 2025-03-18 LAB
ALBUMIN SERPL-MCNC: 3.2 G/DL (ref 3.4–4.8)
ALBUMIN/GLOB SERPL: 0.7 RATIO (ref 1.1–2)
ALP SERPL-CCNC: 332 UNIT/L (ref 40–150)
ALT SERPL-CCNC: 18 UNIT/L (ref 0–55)
ANION GAP SERPL CALC-SCNC: 9 MEQ/L
AST SERPL-CCNC: 30 UNIT/L (ref 5–34)
BASOPHILS # BLD AUTO: 0.08 X10(3)/MCL
BASOPHILS NFR BLD AUTO: 1.2 %
BILIRUB SERPL-MCNC: 0.4 MG/DL
BUN SERPL-MCNC: 10.4 MG/DL (ref 9.8–20.1)
CALCIUM SERPL-MCNC: 9.9 MG/DL (ref 8.4–10.2)
CEA SERPL-MCNC: 52.03 NG/ML (ref 0–3)
CHLORIDE SERPL-SCNC: 100 MMOL/L (ref 98–107)
CO2 SERPL-SCNC: 29 MMOL/L (ref 23–31)
CREAT SERPL-MCNC: 0.62 MG/DL (ref 0.55–1.02)
CREAT/UREA NIT SERPL: 17
EOSINOPHIL # BLD AUTO: 0.43 X10(3)/MCL (ref 0–0.9)
EOSINOPHIL NFR BLD AUTO: 6.3 %
ERYTHROCYTE [DISTWIDTH] IN BLOOD BY AUTOMATED COUNT: 17.1 % (ref 11.5–17)
GFR SERPLBLD CREATININE-BSD FMLA CKD-EPI: >60 ML/MIN/1.73/M2
GLOBULIN SER-MCNC: 4.9 GM/DL (ref 2.4–3.5)
GLUCOSE SERPL-MCNC: 94 MG/DL (ref 82–115)
HCT VFR BLD AUTO: 40.3 % (ref 37–47)
HGB BLD-MCNC: 12.7 G/DL (ref 12–16)
IMM GRANULOCYTES # BLD AUTO: 0.11 X10(3)/MCL (ref 0–0.04)
IMM GRANULOCYTES NFR BLD AUTO: 1.6 %
LYMPHOCYTES # BLD AUTO: 2.45 X10(3)/MCL (ref 0.6–4.6)
LYMPHOCYTES NFR BLD AUTO: 35.9 %
MCH RBC QN AUTO: 26.9 PG (ref 27–31)
MCHC RBC AUTO-ENTMCNC: 31.5 G/DL (ref 33–36)
MCV RBC AUTO: 85.4 FL (ref 80–94)
MONOCYTES # BLD AUTO: 0.96 X10(3)/MCL (ref 0.1–1.3)
MONOCYTES NFR BLD AUTO: 14.1 %
NEUTROPHILS # BLD AUTO: 2.8 X10(3)/MCL (ref 2.1–9.2)
NEUTROPHILS NFR BLD AUTO: 40.9 %
PLATELET # BLD AUTO: 532 X10(3)/MCL (ref 130–400)
PMV BLD AUTO: 7.7 FL (ref 7.4–10.4)
POTASSIUM SERPL-SCNC: 3.4 MMOL/L (ref 3.5–5.1)
PROT SERPL-MCNC: 8.1 GM/DL (ref 5.8–7.6)
PROT UR QL STRIP: ABNORMAL
RBC # BLD AUTO: 4.72 X10(6)/MCL (ref 4.2–5.4)
SODIUM SERPL-SCNC: 138 MMOL/L (ref 136–145)
WBC # BLD AUTO: 6.83 X10(3)/MCL (ref 4.5–11.5)

## 2025-03-18 PROCEDURE — 1159F MED LIST DOCD IN RCRD: CPT | Mod: CPTII,S$GLB,, | Performed by: INTERNAL MEDICINE

## 2025-03-18 PROCEDURE — 1160F RVW MEDS BY RX/DR IN RCRD: CPT | Mod: CPTII,S$GLB,, | Performed by: INTERNAL MEDICINE

## 2025-03-18 PROCEDURE — 80053 COMPREHEN METABOLIC PANEL: CPT

## 2025-03-18 PROCEDURE — 85025 COMPLETE CBC W/AUTO DIFF WBC: CPT

## 2025-03-18 PROCEDURE — 82378 CARCINOEMBRYONIC ANTIGEN: CPT

## 2025-03-18 PROCEDURE — 81005 URINALYSIS: CPT

## 2025-03-18 PROCEDURE — 36415 COLL VENOUS BLD VENIPUNCTURE: CPT

## 2025-03-18 PROCEDURE — 3080F DIAST BP >= 90 MM HG: CPT | Mod: CPTII,S$GLB,, | Performed by: INTERNAL MEDICINE

## 2025-03-18 PROCEDURE — 3008F BODY MASS INDEX DOCD: CPT | Mod: CPTII,S$GLB,, | Performed by: INTERNAL MEDICINE

## 2025-03-18 PROCEDURE — 99999 PR PBB SHADOW E&M-EST. PATIENT-LVL III: CPT | Mod: PBBFAC,,, | Performed by: INTERNAL MEDICINE

## 2025-03-18 PROCEDURE — 99215 OFFICE O/P EST HI 40 MIN: CPT | Mod: S$GLB,,, | Performed by: INTERNAL MEDICINE

## 2025-03-18 PROCEDURE — 3077F SYST BP >= 140 MM HG: CPT | Mod: CPTII,S$GLB,, | Performed by: INTERNAL MEDICINE

## 2025-03-19 NOTE — PROGRESS NOTES
Subjective:       Patient ID: Alondra Snyder is a 59 y.o. female.    Chief Complaint:  I feel pretty good today    Diagnosis: Metastatic rectal cancer                    Stage IIIA rectal carcinoma 3/15 (T2 N1b M0); 3/28+ nodes     Treatment History  Oxaliplatin/Xeloda x6 completed 8/15  --> Xeloda/XRT completed 11/11/15  FOLFIRI + Bevacizumab x 10 (7/24-12/24)    Current Treatment:  FOLFOX + Bevacizumab s/p 3 cycles (started 2/24/25)     Clinical History: Patient was referred for a first time screening colonoscopy at the age of 49 by her gynecologist.  She had no abdominal symptoms or complaints, changes in her bowel habits, melena or hematochezia.  Colonoscopy revealed a malignant appearing polyp at the rectosigmoid junction.  Biopsy was positive for adenocarcinoma.  Preoperative CEA level was normal 2 ng/mL.  Chest x-ray was unremarkable.  CT A/P showed a large solid left adnexal mass measuring 4.9 x 6.1 cm.  There was no specific abnormality in the sigmoid colon or rectum and no evidence of metastatic disease.  Pelvic ultrasound confirmed that the lesion was a uterine fibroid.  She underwent a laparoscopic resection with primary reanastomosis 3/18/15.  Final pathology showed a 2 cm moderately differentiated adenocarcinoma invading into but not through the muscularis propria.  3 out of 28 lymph nodes were positive for metastatic involvement. All resection margins were clear.  At the time of surgery, the lesion was delineated to be in the upper rectum. She recovered from her surgery uneventfully.  She completed adjuvant chemotherapy and radiation as documented above. Her Mediport catheter was removed 10/17.    She was lost to follow-up from 9/19 until 6/21/23 due to the COVID-19 pandemic.  She reported no significant problems or health issues in the interim.  She had a screening colonoscopy 1/2/20 that showed a single polyp in the ascending colon which was removed with pathology showing benign polypoid colonic  mucosa with no evidence of adenoma.  Follow-up exam was recommended in 5 years.    Laboratory testing 5/31/24 prior to her annual surveillance visit showed an elevated CEA level of 10.16 ng/mL.  Testing was repeated on 6/6/24 with a level of 9.84 ng/mL.  CT C/A/P 6/10/24 showed a 7.8 cm lobulated soft tissue mass in the left upper lobe extending from the left hilum to the pleural margin.  There was a 1.5 cm right hilar node and small AP window nodes.  There was a stable nodular soft tissue density adjacent to the left uterine margin unchanged from 2019.  CT-guided biopsy 6/17/24 revealed a metastatic adenocarcinoma consistent with colorectal primary.  She had left on a trip to [place] when the results came back.  She arranged to be seen at [facility] while she was in [place].    Workup at [facility]  CT-PET scan 7/12/24:  Large DIANA hypermetabolic mass measuring up to 8.1 cm with central necrosis.  Multiple hypermetabolic hepatic metastases.  MRI abdomen 7/12/24:  At least 7 bipolar hepatic metastases, largest 3.0 cm segment MIGUEL.  MRI brain 7/12/24:  No metastatic disease.    Molecular testing:  HER2 negative.  Pathology QNS for additional molecular studies.    [facility] peripheral blood 8/7/24: +KRAS G12D, FRANCESCO.  Positive mutations of APC, PTEN, TP53 and FBXW7    She was started on palliative chemotherapy with a regimen of FOLFIRI and Bevacizumab 7/31/24.  CT C/A/P 10/21/24: DIANA mass 5.9 x 5.4 cm (previous 6.9 x 7.3 cm), left hilar LN 1.5 x 1.1 cm (previous 1.8 x 1.5 cm).  Multiple hypodense liver metastases, largest lesion left hepatic lobe 4.9 cm.        CT C/A/P 12/19/24:  Stable DIANA mass 5.8 x 5.2 cm.  Bilobar hepatic metastases, several showing slight interval enlargement.  Right adrenal nodule 1.2 cm (previous 1 cm).    CT-guided liver biopsy 1/13/25:  Metastatic adenocarcinoma consistent with colorectal primary (CK20 and CDX2+)    [facility] Board 2/13/25:  Minimal disease  progression on imaging 10/24 to 12/24 with slight enlargement of multiple liver lesions and stable appearance of the lung and adrenal metastases.  Recommend transitioning to FOLFOX + Bevacizumab.    Interval History  She returns to the office for a 2 week follow-up visit accompanied by her wife.  She has now completed 3 cycles of treatment with FOLFOX and Bevacizumab.  She had a hypersensitivity reaction to oxaliplatin during her last cycle of therapy.  Her symptoms resolved with supportive care and IV Benadryl.  Treatment was resumed at a reduced rate with no recurrence of her symptoms.  She continues to feel well.  She has no abdominal symptoms or complaints.  Appetite is good, her weight is stable.  No progressive neuropathy symptoms.  No evidence of hand-foot syndrome.  No chest pain, shortness of breath, cough or sputum production.  Blood pressure is stable.  No significant proteinuria.       Review of Systems   Constitutional:  Negative for appetite change, fatigue, fever and unexpected weight change.   HENT:  Negative for nasal congestion, mouth sores, sore throat and trouble swallowing.    Eyes: Negative.    Respiratory:  Negative for cough, shortness of breath and wheezing.    Cardiovascular:  Negative for chest pain, palpitations and leg swelling.   Gastrointestinal:  Negative for abdominal distention, abdominal pain, constipation, diarrhea and nausea.   Genitourinary:  Negative for dysuria, flank pain and frequency.   Musculoskeletal:  Negative for arthralgias and back pain.   Integumentary:  Negative for pallor and rash.   Neurological:  Negative for dizziness, weakness, numbness and headaches.   Hematological:  Negative for adenopathy. Does not bruise/bleed easily.   Psychiatric/Behavioral: Negative.         PMHx:  Endometriosis, anxiety/depression  PSHx:  Tonsils, sinus surgery, right oophorectomy, partial colectomy  SH:  Lifetime nonsmoker, occasional alcohol use.  Lives in [place] with her  "significant other.  Works as a .  FH:  Mother had kidney cancer.  A maternal aunt and 1st cousin had breast cancer.    Objective:        /87 (Patient Position: Sitting)   Pulse 78   Temp 98 °F (36.7 °C)   Resp 15   Ht 5' 7" (1.702 m)   Wt 76.2 kg (168 lb)   SpO2 98%   BMI 26.31 kg/m²    Physical Exam  Constitutional:       Comments: Well-developed white female in NAD   HENT:      Head: Normocephalic.      Mouth/Throat:      Mouth: Mucous membranes are moist.      Pharynx: Oropharynx is clear. No posterior oropharyngeal erythema.   Eyes:      General: No scleral icterus.     Extraocular Movements: Extraocular movements intact.      Pupils: Pupils are equal, round, and reactive to light.   Cardiovascular:      Rate and Rhythm: Normal rate and regular rhythm.      Heart sounds: No murmur heard.     Comments: MediPort catheter right chest wall and MediPort removal incision left chest wall.  Pulmonary:      Comments: Lungs clear to auscultation  Abdominal:      General: Bowel sounds are normal. There is no distension.      Palpations: Abdomen is soft.      Tenderness: There is no abdominal tenderness.      Comments: Well-healed midline incision below umbilicus and laparoscopic sites. No palpable masses or organomegaly.   Musculoskeletal:         General: No swelling or tenderness. Normal range of motion.      Cervical back: Neck supple. No tenderness.   Skin:     General: Skin is warm and dry.      Findings: No rash.   Neurological:      General: No focal deficit present.      Mental Status: She is alert and oriented to person, place, and time.      Cranial Nerves: No cranial nerve deficit.      Motor: No weakness.     ECOG SCORE    0 - Fully active-able to carry on all pre-disease performance without restriction          LABORATORY  Recent Results (from the past week)   Protein,UA (Dipstick)    Collection Time: 04/02/25  9:19 AM   Result Value Ref Range    Protein, UA 1+ (A) " Negative   CBC with Differential    Collection Time: 04/02/25  9:19 AM   Result Value Ref Range    WBC 6.64 4.50 - 11.50 x10(3)/mcL    RBC 5.17 4.20 - 5.40 x10(6)/mcL    Hgb 13.8 12.0 - 16.0 g/dL    Hct 43.4 37.0 - 47.0 %    MCV 83.9 80.0 - 94.0 fL    MCH 26.7 (L) 27.0 - 31.0 pg    MCHC 31.8 (L) 33.0 - 36.0 g/dL    RDW 18.9 (H) 11.5 - 17.0 %    Platelet 421 (H) 130 - 400 x10(3)/mcL    MPV 7.8 7.4 - 10.4 fL    Neut % 32.0 %    Lymph % 44.1 %    Mono % 14.8 %    Eos % 6.6 %    Basophil % 1.4 %    Imm Grans % 1.1 %    Neut # 2.13 2.1 - 9.2 x10(3)/mcL    Lymph # 2.93 0.6 - 4.6 x10(3)/mcL    Mono # 0.98 0.1 - 1.3 x10(3)/mcL    Eos # 0.44 0 - 0.9 x10(3)/mcL    Baso # 0.09 <=0.2 x10(3)/mcL    Imm Gran # 0.07 (H) 0.00 - 0.04 x10(3)/mcL                    8/26/24   9/18/24   9/23/24   11/13/24   12/23/24   3/18/25  CEA        23.9        17.1        16.0         11.8          11.7         52.0    Assessment:   Metastatic rectal cancer  Mild treatment-related hypertension      Plan:   She will proceed with her 4th cycle of therapy on 4/7/25 at full dose.  Given her previous hypersensitivity reaction, will decrease the infusion rate of Oxaliplatin and premedicate with IV Benadryl.  She will be scheduled for restaging CT scans of the chest, abdomen and pelvis after her 4th cycle of therapy.  RTC in 2 weeks for a follow-up visit and to review the results of her CT scans.      TIM HARDY MD     Other Physicians  Dr. Jaimee Sanches Dr.Koffi Blake

## 2025-03-21 RX ORDER — HEPARIN 100 UNIT/ML
500 SYRINGE INTRAVENOUS
OUTPATIENT
Start: 2025-03-24

## 2025-03-21 RX ORDER — PROCHLORPERAZINE EDISYLATE 5 MG/ML
10 INJECTION INTRAMUSCULAR; INTRAVENOUS ONCE AS NEEDED
OUTPATIENT
Start: 2025-03-24

## 2025-03-21 RX ORDER — SODIUM CHLORIDE 0.9 % (FLUSH) 0.9 %
10 SYRINGE (ML) INJECTION
OUTPATIENT
Start: 2025-03-26

## 2025-03-21 RX ORDER — DIPHENHYDRAMINE HYDROCHLORIDE 50 MG/ML
25 INJECTION, SOLUTION INTRAMUSCULAR; INTRAVENOUS
Start: 2025-03-24

## 2025-03-21 RX ORDER — FLUOROURACIL 50 MG/ML
400 INJECTION, SOLUTION INTRAVENOUS
OUTPATIENT
Start: 2025-03-24

## 2025-03-21 RX ORDER — SODIUM CHLORIDE 0.9 % (FLUSH) 0.9 %
10 SYRINGE (ML) INJECTION
OUTPATIENT
Start: 2025-03-24

## 2025-03-21 RX ORDER — PROCHLORPERAZINE EDISYLATE 5 MG/ML
10 INJECTION INTRAMUSCULAR; INTRAVENOUS ONCE AS NEEDED
OUTPATIENT
Start: 2025-03-26

## 2025-03-21 RX ORDER — EPINEPHRINE 0.3 MG/.3ML
0.3 INJECTION SUBCUTANEOUS ONCE AS NEEDED
OUTPATIENT
Start: 2025-03-24

## 2025-03-21 RX ORDER — DIPHENHYDRAMINE HYDROCHLORIDE 50 MG/ML
50 INJECTION, SOLUTION INTRAMUSCULAR; INTRAVENOUS ONCE AS NEEDED
OUTPATIENT
Start: 2025-03-24

## 2025-03-21 RX ORDER — HEPARIN 100 UNIT/ML
500 SYRINGE INTRAVENOUS
OUTPATIENT
Start: 2025-03-26

## 2025-03-24 ENCOUNTER — INFUSION (OUTPATIENT)
Dept: INFUSION THERAPY | Facility: HOSPITAL | Age: 60
End: 2025-03-24
Attending: INTERNAL MEDICINE
Payer: COMMERCIAL

## 2025-03-24 VITALS
WEIGHT: 169 LBS | RESPIRATION RATE: 16 BRPM | TEMPERATURE: 98 F | BODY MASS INDEX: 26.53 KG/M2 | DIASTOLIC BLOOD PRESSURE: 66 MMHG | OXYGEN SATURATION: 98 % | SYSTOLIC BLOOD PRESSURE: 165 MMHG | HEIGHT: 67 IN | HEART RATE: 60 BPM

## 2025-03-24 DIAGNOSIS — C20 RECTAL CANCER: Primary | ICD-10-CM

## 2025-03-24 DIAGNOSIS — C78.01 MALIGNANT NEOPLASM METASTATIC TO RIGHT LUNG: ICD-10-CM

## 2025-03-24 DIAGNOSIS — C78.7 SECONDARY MALIGNANT NEOPLASM OF LIVER: ICD-10-CM

## 2025-03-24 PROCEDURE — 96413 CHEMO IV INFUSION 1 HR: CPT

## 2025-03-24 PROCEDURE — 25000003 PHARM REV CODE 250: Performed by: INTERNAL MEDICINE

## 2025-03-24 PROCEDURE — 96368 THER/DIAG CONCURRENT INF: CPT

## 2025-03-24 PROCEDURE — 63600175 PHARM REV CODE 636 W HCPCS: Performed by: INTERNAL MEDICINE

## 2025-03-24 PROCEDURE — 96415 CHEMO IV INFUSION ADDL HR: CPT

## 2025-03-24 PROCEDURE — A4216 STERILE WATER/SALINE, 10 ML: HCPCS | Performed by: INTERNAL MEDICINE

## 2025-03-24 PROCEDURE — 96375 TX/PRO/DX INJ NEW DRUG ADDON: CPT

## 2025-03-24 PROCEDURE — 96411 CHEMO IV PUSH ADDL DRUG: CPT

## 2025-03-24 PROCEDURE — 96367 TX/PROPH/DG ADDL SEQ IV INF: CPT

## 2025-03-24 PROCEDURE — 96417 CHEMO IV INFUS EACH ADDL SEQ: CPT

## 2025-03-24 PROCEDURE — 96416 CHEMO PROLONG INFUSE W/PUMP: CPT

## 2025-03-24 RX ORDER — EPINEPHRINE 0.3 MG/.3ML
0.3 INJECTION SUBCUTANEOUS ONCE AS NEEDED
Status: DISCONTINUED | OUTPATIENT
Start: 2025-03-24 | End: 2025-03-24 | Stop reason: HOSPADM

## 2025-03-24 RX ORDER — HEPARIN 100 UNIT/ML
500 SYRINGE INTRAVENOUS
Status: DISCONTINUED | OUTPATIENT
Start: 2025-03-24 | End: 2025-03-24 | Stop reason: HOSPADM

## 2025-03-24 RX ORDER — DIPHENHYDRAMINE HYDROCHLORIDE 50 MG/ML
50 INJECTION, SOLUTION INTRAMUSCULAR; INTRAVENOUS ONCE AS NEEDED
Status: DISCONTINUED | OUTPATIENT
Start: 2025-03-24 | End: 2025-03-24 | Stop reason: HOSPADM

## 2025-03-24 RX ORDER — DIPHENHYDRAMINE HYDROCHLORIDE 50 MG/ML
25 INJECTION, SOLUTION INTRAMUSCULAR; INTRAVENOUS
Status: COMPLETED | OUTPATIENT
Start: 2025-03-24 | End: 2025-03-24

## 2025-03-24 RX ORDER — FLUOROURACIL 50 MG/ML
400 INJECTION, SOLUTION INTRAVENOUS
Status: COMPLETED | OUTPATIENT
Start: 2025-03-24 | End: 2025-03-24

## 2025-03-24 RX ORDER — PROCHLORPERAZINE EDISYLATE 5 MG/ML
10 INJECTION INTRAMUSCULAR; INTRAVENOUS ONCE AS NEEDED
Status: DISCONTINUED | OUTPATIENT
Start: 2025-03-24 | End: 2025-03-24 | Stop reason: HOSPADM

## 2025-03-24 RX ORDER — FAMOTIDINE 10 MG/ML
20 INJECTION, SOLUTION INTRAVENOUS
Status: COMPLETED | OUTPATIENT
Start: 2025-03-24 | End: 2025-03-24

## 2025-03-24 RX ORDER — SODIUM CHLORIDE 0.9 % (FLUSH) 0.9 %
10 SYRINGE (ML) INJECTION
Status: DISCONTINUED | OUTPATIENT
Start: 2025-03-24 | End: 2025-03-24 | Stop reason: HOSPADM

## 2025-03-24 RX ADMIN — Medication 10 ML: at 08:03

## 2025-03-24 RX ADMIN — LEUCOVORIN CALCIUM 750 MG: 350 INJECTION, POWDER, LYOPHILIZED, FOR SOLUTION INTRAMUSCULAR; INTRAVENOUS at 10:03

## 2025-03-24 RX ADMIN — SODIUM CHLORIDE 0.25 MG: 9 INJECTION, SOLUTION INTRAVENOUS at 09:03

## 2025-03-24 RX ADMIN — SODIUM CHLORIDE 400 MG: 9 INJECTION, SOLUTION INTRAVENOUS at 09:03

## 2025-03-24 RX ADMIN — DIPHENHYDRAMINE HYDROCHLORIDE 25 MG: 50 INJECTION INTRAMUSCULAR; INTRAVENOUS at 10:03

## 2025-03-24 RX ADMIN — DEXTROSE MONOHYDRATE: 50 INJECTION, SOLUTION INTRAVENOUS at 10:03

## 2025-03-24 RX ADMIN — FLUOROURACIL 4500 MG: 50 INJECTION, SOLUTION INTRAVENOUS at 02:03

## 2025-03-24 RX ADMIN — SODIUM CHLORIDE: 9 INJECTION, SOLUTION INTRAVENOUS at 08:03

## 2025-03-24 RX ADMIN — OXALIPLATIN 165 MG: 5 INJECTION, SOLUTION INTRAVENOUS at 10:03

## 2025-03-24 RX ADMIN — HYDROCORTISONE SODIUM SUCCINATE 100 MG: 100 INJECTION, POWDER, FOR SOLUTION INTRAMUSCULAR; INTRAVENOUS at 10:03

## 2025-03-24 RX ADMIN — FAMOTIDINE 20 MG: 10 INJECTION, SOLUTION INTRAVENOUS at 10:03

## 2025-03-24 RX ADMIN — FLUOROURACIL 775 MG: 50 INJECTION, SOLUTION INTRAVENOUS at 02:03

## 2025-03-24 NOTE — NURSING
Pt here for C3 D1 FOLFOX + Zirabev. Zirabev infusion given, tolerated well. Aloxi/Dex 12mg IVPB and Benadryl 25mg IVP given. Oxaliplatin started at 1019 rate of 300cc/hour, Leucovorin started at 1020.     At 1034, after receiving 74cc of Oxaliplatin, pt flushed, c/o itching on her back. Oxaliplatin and Leucovorin stopped. Pt hypotensive, BP 65/40, and Bradycardic with HR 39, Oxygen 87% on room air. 500cc NS bolus initiated and 5L NC applied.     MD paged overhead, SoluCortef 100mg IVP given at 1040. Multiple Physicians and Practitioners arrived to infusion to assess patient, ordered Pepcid 20mg IVP, given at 1043. Blood pressure recovered (115/68) at 1050, Dr. Hernandez and Carolyn, NP at patient's side.     Per Dr. Hernandez, re-start Oxaliplatin at half of the full rate in 15-20 minutes, monitor closely and observe patient 30 minutes post treatment.     Oxaliplatin re-started at 1141 at 150cc/hour. Dr. Hernandez and Carolyn returned to infusion multiple times to check on patient.  At 1206, rate increased to 200cc/hour. Oxaliplatin completed at 1415. Leucovorin completed at 1420. No further adverse events noted. 5FU CADD pump connected at 1450.     Pt discharged home in stable condition at 1455, will return on 3-26-25 at 1300 for CADD pump disconnect.

## 2025-03-26 ENCOUNTER — INFUSION (OUTPATIENT)
Dept: INFUSION THERAPY | Facility: HOSPITAL | Age: 60
End: 2025-03-26
Attending: INTERNAL MEDICINE
Payer: COMMERCIAL

## 2025-03-26 VITALS
TEMPERATURE: 98 F | HEART RATE: 74 BPM | SYSTOLIC BLOOD PRESSURE: 142 MMHG | OXYGEN SATURATION: 97 % | WEIGHT: 169 LBS | RESPIRATION RATE: 18 BRPM | BODY MASS INDEX: 26.53 KG/M2 | DIASTOLIC BLOOD PRESSURE: 89 MMHG | HEIGHT: 67 IN

## 2025-03-26 DIAGNOSIS — C78.7 SECONDARY MALIGNANT NEOPLASM OF LIVER: ICD-10-CM

## 2025-03-26 DIAGNOSIS — C78.01 MALIGNANT NEOPLASM METASTATIC TO RIGHT LUNG: ICD-10-CM

## 2025-03-26 DIAGNOSIS — C20 RECTAL CANCER: Primary | ICD-10-CM

## 2025-03-26 PROCEDURE — 25000003 PHARM REV CODE 250: Performed by: INTERNAL MEDICINE

## 2025-03-26 PROCEDURE — 63600175 PHARM REV CODE 636 W HCPCS: Performed by: INTERNAL MEDICINE

## 2025-03-26 PROCEDURE — A4216 STERILE WATER/SALINE, 10 ML: HCPCS | Performed by: INTERNAL MEDICINE

## 2025-03-26 RX ORDER — SODIUM CHLORIDE 0.9 % (FLUSH) 0.9 %
10 SYRINGE (ML) INJECTION
Status: DISCONTINUED | OUTPATIENT
Start: 2025-03-26 | End: 2025-03-26 | Stop reason: HOSPADM

## 2025-03-26 RX ORDER — HEPARIN 100 UNIT/ML
500 SYRINGE INTRAVENOUS
Status: DISCONTINUED | OUTPATIENT
Start: 2025-03-26 | End: 2025-03-26 | Stop reason: HOSPADM

## 2025-03-26 RX ORDER — PROCHLORPERAZINE EDISYLATE 5 MG/ML
10 INJECTION INTRAMUSCULAR; INTRAVENOUS ONCE AS NEEDED
Status: DISCONTINUED | OUTPATIENT
Start: 2025-03-26 | End: 2025-03-26 | Stop reason: HOSPADM

## 2025-03-26 RX ADMIN — HEPARIN 500 UNITS: 100 SYRINGE at 01:03

## 2025-03-26 RX ADMIN — Medication 10 ML: at 01:03

## 2025-03-26 NOTE — NURSING
Pt here for C3 D3. CADD Pump disconnected, Mediport flushed and de-accessed, tolerated well. Pt discharged home in stable condition, aware of future appts.

## 2025-04-02 ENCOUNTER — LAB VISIT (OUTPATIENT)
Dept: LAB | Facility: HOSPITAL | Age: 60
End: 2025-04-02
Attending: INTERNAL MEDICINE
Payer: COMMERCIAL

## 2025-04-02 ENCOUNTER — OFFICE VISIT (OUTPATIENT)
Dept: HEMATOLOGY/ONCOLOGY | Facility: CLINIC | Age: 60
End: 2025-04-02
Payer: COMMERCIAL

## 2025-04-02 VITALS
HEIGHT: 67 IN | WEIGHT: 168 LBS | BODY MASS INDEX: 26.37 KG/M2 | DIASTOLIC BLOOD PRESSURE: 87 MMHG | TEMPERATURE: 98 F | HEART RATE: 78 BPM | SYSTOLIC BLOOD PRESSURE: 133 MMHG | RESPIRATION RATE: 15 BRPM | OXYGEN SATURATION: 98 %

## 2025-04-02 DIAGNOSIS — C78.02 MALIGNANT NEOPLASM METASTATIC TO LEFT LUNG: ICD-10-CM

## 2025-04-02 DIAGNOSIS — C78.7 SECONDARY MALIGNANT NEOPLASM OF LIVER: ICD-10-CM

## 2025-04-02 DIAGNOSIS — D84.821 DRUG-INDUCED IMMUNODEFICIENCY: ICD-10-CM

## 2025-04-02 DIAGNOSIS — C78.02 SECONDARY MALIGNANCY OF LEFT LUNG: ICD-10-CM

## 2025-04-02 DIAGNOSIS — Z79.899 DRUG-INDUCED IMMUNODEFICIENCY: ICD-10-CM

## 2025-04-02 DIAGNOSIS — C20 RECTAL CANCER: ICD-10-CM

## 2025-04-02 DIAGNOSIS — C78.01 MALIGNANT NEOPLASM METASTATIC TO RIGHT LUNG: ICD-10-CM

## 2025-04-02 DIAGNOSIS — C20 RECTAL CANCER: Primary | ICD-10-CM

## 2025-04-02 LAB
ALBUMIN SERPL-MCNC: 3.5 G/DL (ref 3.4–4.8)
ALBUMIN/GLOB SERPL: 0.8 RATIO (ref 1.1–2)
ALP SERPL-CCNC: 235 UNIT/L (ref 40–150)
ALT SERPL-CCNC: 15 UNIT/L (ref 0–55)
ANION GAP SERPL CALC-SCNC: 8 MEQ/L
AST SERPL-CCNC: 25 UNIT/L (ref 11–45)
BASOPHILS # BLD AUTO: 0.09 X10(3)/MCL
BASOPHILS NFR BLD AUTO: 1.4 %
BILIRUB SERPL-MCNC: 0.5 MG/DL
BUN SERPL-MCNC: 9.5 MG/DL (ref 9.8–20.1)
CALCIUM SERPL-MCNC: 9.5 MG/DL (ref 8.4–10.2)
CHLORIDE SERPL-SCNC: 101 MMOL/L (ref 98–107)
CO2 SERPL-SCNC: 29 MMOL/L (ref 23–31)
CREAT SERPL-MCNC: 0.61 MG/DL (ref 0.55–1.02)
CREAT/UREA NIT SERPL: 16
EOSINOPHIL # BLD AUTO: 0.44 X10(3)/MCL (ref 0–0.9)
EOSINOPHIL NFR BLD AUTO: 6.6 %
ERYTHROCYTE [DISTWIDTH] IN BLOOD BY AUTOMATED COUNT: 18.9 % (ref 11.5–17)
GFR SERPLBLD CREATININE-BSD FMLA CKD-EPI: >60 ML/MIN/1.73/M2
GLOBULIN SER-MCNC: 4.4 GM/DL (ref 2.4–3.5)
GLUCOSE SERPL-MCNC: 85 MG/DL (ref 82–115)
HCT VFR BLD AUTO: 43.4 % (ref 37–47)
HGB BLD-MCNC: 13.8 G/DL (ref 12–16)
IMM GRANULOCYTES # BLD AUTO: 0.07 X10(3)/MCL (ref 0–0.04)
IMM GRANULOCYTES NFR BLD AUTO: 1.1 %
LYMPHOCYTES # BLD AUTO: 2.93 X10(3)/MCL (ref 0.6–4.6)
LYMPHOCYTES NFR BLD AUTO: 44.1 %
MCH RBC QN AUTO: 26.7 PG (ref 27–31)
MCHC RBC AUTO-ENTMCNC: 31.8 G/DL (ref 33–36)
MCV RBC AUTO: 83.9 FL (ref 80–94)
MONOCYTES # BLD AUTO: 0.98 X10(3)/MCL (ref 0.1–1.3)
MONOCYTES NFR BLD AUTO: 14.8 %
NEUTROPHILS # BLD AUTO: 2.13 X10(3)/MCL (ref 2.1–9.2)
NEUTROPHILS NFR BLD AUTO: 32 %
PLATELET # BLD AUTO: 421 X10(3)/MCL (ref 130–400)
PMV BLD AUTO: 7.8 FL (ref 7.4–10.4)
POTASSIUM SERPL-SCNC: 3.2 MMOL/L (ref 3.5–5.1)
PROT SERPL-MCNC: 7.9 GM/DL (ref 5.8–7.6)
PROT UR QL STRIP: ABNORMAL
RBC # BLD AUTO: 5.17 X10(6)/MCL (ref 4.2–5.4)
SODIUM SERPL-SCNC: 138 MMOL/L (ref 136–145)
WBC # BLD AUTO: 6.64 X10(3)/MCL (ref 4.5–11.5)

## 2025-04-02 PROCEDURE — 3079F DIAST BP 80-89 MM HG: CPT | Mod: CPTII,S$GLB,, | Performed by: INTERNAL MEDICINE

## 2025-04-02 PROCEDURE — 1159F MED LIST DOCD IN RCRD: CPT | Mod: CPTII,S$GLB,, | Performed by: INTERNAL MEDICINE

## 2025-04-02 PROCEDURE — 99999 PR PBB SHADOW E&M-EST. PATIENT-LVL IV: CPT | Mod: PBBFAC,,, | Performed by: INTERNAL MEDICINE

## 2025-04-02 PROCEDURE — 3008F BODY MASS INDEX DOCD: CPT | Mod: CPTII,S$GLB,, | Performed by: INTERNAL MEDICINE

## 2025-04-02 PROCEDURE — 36415 COLL VENOUS BLD VENIPUNCTURE: CPT

## 2025-04-02 PROCEDURE — 1160F RVW MEDS BY RX/DR IN RCRD: CPT | Mod: CPTII,S$GLB,, | Performed by: INTERNAL MEDICINE

## 2025-04-02 PROCEDURE — 80053 COMPREHEN METABOLIC PANEL: CPT

## 2025-04-02 PROCEDURE — 3075F SYST BP GE 130 - 139MM HG: CPT | Mod: CPTII,S$GLB,, | Performed by: INTERNAL MEDICINE

## 2025-04-02 PROCEDURE — 99215 OFFICE O/P EST HI 40 MIN: CPT | Mod: S$GLB,,, | Performed by: INTERNAL MEDICINE

## 2025-04-02 PROCEDURE — 81005 URINALYSIS: CPT

## 2025-04-02 PROCEDURE — 85025 COMPLETE CBC W/AUTO DIFF WBC: CPT

## 2025-04-02 RX ORDER — PROCHLORPERAZINE EDISYLATE 5 MG/ML
10 INJECTION INTRAMUSCULAR; INTRAVENOUS ONCE AS NEEDED
OUTPATIENT
Start: 2025-04-07

## 2025-04-02 RX ORDER — SODIUM CHLORIDE 0.9 % (FLUSH) 0.9 %
10 SYRINGE (ML) INJECTION
OUTPATIENT
Start: 2025-04-07

## 2025-04-02 RX ORDER — DIPHENHYDRAMINE HYDROCHLORIDE 50 MG/ML
50 INJECTION, SOLUTION INTRAMUSCULAR; INTRAVENOUS ONCE AS NEEDED
OUTPATIENT
Start: 2025-04-07

## 2025-04-02 RX ORDER — PROCHLORPERAZINE EDISYLATE 5 MG/ML
10 INJECTION INTRAMUSCULAR; INTRAVENOUS ONCE AS NEEDED
OUTPATIENT
Start: 2025-04-09

## 2025-04-02 RX ORDER — EPINEPHRINE 0.3 MG/.3ML
0.3 INJECTION SUBCUTANEOUS ONCE AS NEEDED
OUTPATIENT
Start: 2025-04-07

## 2025-04-02 RX ORDER — SODIUM CHLORIDE 0.9 % (FLUSH) 0.9 %
10 SYRINGE (ML) INJECTION
OUTPATIENT
Start: 2025-04-09

## 2025-04-02 RX ORDER — DIPHENHYDRAMINE HYDROCHLORIDE 50 MG/ML
25 INJECTION, SOLUTION INTRAMUSCULAR; INTRAVENOUS
Start: 2025-04-07

## 2025-04-02 RX ORDER — HEPARIN 100 UNIT/ML
500 SYRINGE INTRAVENOUS
OUTPATIENT
Start: 2025-04-07

## 2025-04-02 RX ORDER — FLUOROURACIL 50 MG/ML
400 INJECTION, SOLUTION INTRAVENOUS
OUTPATIENT
Start: 2025-04-07

## 2025-04-02 RX ORDER — HEPARIN 100 UNIT/ML
500 SYRINGE INTRAVENOUS
OUTPATIENT
Start: 2025-04-09

## 2025-04-03 NOTE — PROGRESS NOTES
Subjective:       Patient ID: Alondra Snyder is a 59 y.o. female.    Chief Complaint:  Worried about scan results    Diagnosis: Metastatic rectal cancer                    Stage IIIA rectal carcinoma 3/15 (T2 N1b M0); 3/28+ nodes     Treatment History  Oxaliplatin/Xeloda x6 completed 8/15  --> Xeloda/XRT completed 11/11/15  FOLFIRI + Bevacizumab x 10 (7/24-12/24)    Current Treatment:  FOLFOX + Bevacizumab s/p 4 cycles (started 2/24/25)     Clinical History: Patient was referred for a first time screening colonoscopy at the age of 49 by her gynecologist.  She had no abdominal symptoms or complaints, changes in her bowel habits, melena or hematochezia.  Colonoscopy revealed a malignant appearing polyp at the rectosigmoid junction.  Biopsy was positive for adenocarcinoma.  Preoperative CEA level was normal 2 ng/mL.  Chest x-ray was unremarkable.  CT A/P showed a large solid left adnexal mass measuring 4.9 x 6.1 cm.  There was no specific abnormality in the sigmoid colon or rectum and no evidence of metastatic disease.  Pelvic ultrasound confirmed that the lesion was a uterine fibroid.  She underwent a laparoscopic resection with primary reanastomosis 3/18/15.  Final pathology showed a 2 cm moderately differentiated adenocarcinoma invading into but not through the muscularis propria.  3 out of 28 lymph nodes were positive for metastatic involvement. All resection margins were clear.  At the time of surgery, the lesion was delineated to be in the upper rectum. She recovered from her surgery uneventfully.  She completed adjuvant chemotherapy and radiation as documented above. Her Mediport catheter was removed 10/17.    She was lost to follow-up from 9/19 until 6/21/23 due to the COVID-19 pandemic.  She reported no significant problems or health issues in the interim.  She had a screening colonoscopy 1/2/20 that showed a single polyp in the ascending colon which was removed with pathology showing benign polypoid colonic  mucosa with no evidence of adenoma.  Follow-up exam was recommended in 5 years.    Laboratory testing 5/31/24 prior to her annual surveillance visit showed an elevated CEA level of 10.16 ng/mL.  Testing was repeated on 6/6/24 with a level of 9.84 ng/mL.  CT C/A/P 6/10/24 showed a 7.8 cm lobulated soft tissue mass in the left upper lobe extending from the left hilum to the pleural margin.  There was a 1.5 cm right hilar node and small AP window nodes.  There was a stable nodular soft tissue density adjacent to the left uterine margin unchanged from 2019.  CT-guided biopsy 6/17/24 revealed a metastatic adenocarcinoma consistent with colorectal primary.  She had left on a trip to [place] when the results came back.  She arranged to be seen at [facility] [place] while she was in [place].    Workup at [facility] [place]  CT-PET scan 7/12/24:  Large DIANA hypermetabolic mass measuring up to 8.1 cm with central necrosis.  Multiple hypermetabolic hepatic metastases.  MRI abdomen 7/12/24:  At least 7 bipolar hepatic metastases, largest 3.0 cm segment MIGUEL.  MRI brain 7/12/24:  No metastatic disease.    Molecular testing:  HER2 negative.  Pathology QNS for additional molecular studies.    [facility] peripheral blood 8/7/24: +KRAS G12D, FRANCESCO.  Positive mutations of APC, PTEN, TP53 and FBXW7    She was started on palliative chemotherapy with a regimen of FOLFIRI and Bevacizumab 7/31/24.  CT C/A/P 10/21/24: DIANA mass 5.9 x 5.4 cm (previous 6.9 x 7.3 cm), left hilar LN 1.5 x 1.1 cm (previous 1.8 x 1.5 cm).  Multiple hypodense liver metastases, largest lesion left hepatic lobe 4.9 cm.        CT C/A/P 12/19/24:  Stable DIANA mass 5.8 x 5.2 cm.  Bilobar hepatic metastases, several showing slight interval enlargement.  Right adrenal nodule 1.2 cm (previous 1 cm).    CT-guided liver biopsy 1/13/25:  Metastatic adenocarcinoma consistent with colorectal primary (CK20 and CDX2+)    [facility] 2/13/25:  Minimal disease  progression on imaging 10/24 to 12/24 with slight enlargement of multiple liver lesions and stable appearance of the lung and adrenal metastases.  Recommend transitioning to FOLFOX + Bevacizumab.    CT C/A/P 4/11/25:  DIANA mass decreased 5.0 x 4.8 cm (previous 5.8 x 5.2 cm).  Progressed hepatic metastatic disease, examples anterior left hepatic lobe 4.2 x 4.0 cm (previous 2.7 x 2.3 cm), lateral right hepatic lobe 4.7 x 4.2 cm (previous 3.1 x 2.5 cm), multiple new hepatic metastases.  Right adrenal metastases 2.9 x 1.4 cm (previous 1.6 x 1.1 cm).    Interval History  She returns to clinic today for a 2 week follow-up visit accompanied by her wife.  She has completed 4 cycles of treatment with FOLFOX and bevacizumab.  She had a hypersensitivity reaction to oxaliplatin with her 3rd cycle of therapy.  Premedications were adjusted and her infusion time was length and with improved tolerance.  She remains completely asymptomatic with an ECOG performance status of 0.  Follow-up CT scans of the chest, abdomen and pelvis 4/11/25 showed progression of her hepatic metastatic disease and enlargement of the right adrenal gland metastasis.  Left upper lung nodule showed slight decrease in size as documented above.  She is very anxious regarding the results.       Review of Systems   Constitutional:  Negative for appetite change, fatigue, fever and unexpected weight change.   HENT:  Negative for nasal congestion, mouth sores, sore throat and trouble swallowing.    Eyes: Negative.    Respiratory:  Negative for cough, shortness of breath and wheezing.    Cardiovascular:  Negative for chest pain, palpitations and leg swelling.   Gastrointestinal:  Negative for abdominal distention, abdominal pain, constipation, diarrhea and nausea.   Genitourinary:  Negative for dysuria, flank pain and frequency.   Musculoskeletal:  Negative for arthralgias and back pain.   Integumentary:  Negative for pallor and rash.   Neurological:  Negative for  "dizziness, weakness, numbness and headaches.   Hematological:  Negative for adenopathy. Does not bruise/bleed easily.   Psychiatric/Behavioral: Negative.         PMHx:  Endometriosis, anxiety/depression  PSHx:  Tonsils, sinus surgery, right oophorectomy, partial colectomy  SH:  Lifetime nonsmoker, occasional alcohol use.  Lives in [place] with her significant other.  Works as a .  FH:  Mother had kidney cancer.  A maternal aunt and 1st cousin had breast cancer.    Objective:        BP (!) 147/87 (Patient Position: Sitting)   Pulse 73   Temp 97.8 °F (36.6 °C)   Resp 15   Ht 5' 6" (1.676 m)   Wt 75.9 kg (167 lb 6.4 oz)   SpO2 98%   BMI 27.02 kg/m²    Physical Exam  Constitutional:       Comments: Well-developed white female in NAD   HENT:      Head: Normocephalic.      Mouth/Throat:      Mouth: Mucous membranes are moist.      Pharynx: Oropharynx is clear. No posterior oropharyngeal erythema.   Eyes:      General: No scleral icterus.     Extraocular Movements: Extraocular movements intact.      Pupils: Pupils are equal, round, and reactive to light.   Cardiovascular:      Rate and Rhythm: Normal rate and regular rhythm.      Heart sounds: No murmur heard.     Comments: MediPort catheter right chest wall and MediPort removal incision left chest wall.  Pulmonary:      Comments: Lungs clear to auscultation  Abdominal:      General: Bowel sounds are normal. There is no distension.      Palpations: Abdomen is soft.      Tenderness: There is no abdominal tenderness.      Comments: Well-healed midline incision below umbilicus and laparoscopic sites. No palpable masses or organomegaly.   Musculoskeletal:         General: No swelling or tenderness. Normal range of motion.      Cervical back: Neck supple. No tenderness.   Skin:     General: Skin is warm and dry.      Findings: No rash.   Neurological:      General: No focal deficit present.      Mental Status: She is alert and oriented to " person, place, and time.      Cranial Nerves: No cranial nerve deficit.      Motor: No weakness.       ECOG SCORE    0 - Fully active-able to carry on all pre-disease performance without restriction          LABORATORY  Recent Results (from the past week)   CBC with Differential    Collection Time: 04/16/25  2:22 PM   Result Value Ref Range    WBC 5.77 4.50 - 11.50 x10(3)/mcL    RBC 4.86 4.20 - 5.40 x10(6)/mcL    Hgb 13.1 12.0 - 16.0 g/dL    Hct 40.9 37.0 - 47.0 %    MCV 84.2 80.0 - 94.0 fL    MCH 27.0 27.0 - 31.0 pg    MCHC 32.0 (L) 33.0 - 36.0 g/dL    RDW 19.7 (H) 11.5 - 17.0 %    Platelet 295 130 - 400 x10(3)/mcL    MPV 7.8 7.4 - 10.4 fL    Neut % 45.7 %    Lymph % 36.4 %    Mono % 12.0 %    Eos % 4.5 %    Basophil % 0.7 %    Imm Grans % 0.7 %    Neut # 2.64 2.1 - 9.2 x10(3)/mcL    Lymph # 2.10 0.6 - 4.6 x10(3)/mcL    Mono # 0.69 0.1 - 1.3 x10(3)/mcL    Eos # 0.26 0 - 0.9 x10(3)/mcL    Baso # 0.04 <=0.2 x10(3)/mcL    Imm Gran # 0.04 0.00 - 0.04 x10(3)/mcL                    8/26/24   9/18/24   9/23/24   11/13/24   12/23/24   3/18/25  CEA        23.9        17.1        16.0         11.8          11.7         52.0    Assessment:   Metastatic rectal cancer  Mild treatment-related hypertension      Plan:   CT images were reviewed in the office today in the presence of the patient and her wife.    Unfortunately, scans show progression of her hepatic metastatic disease.  She has no associated symptoms.    Her case was discussed with GI medical oncology, Dr. Lozano in [place].  Her case will be presented to the multidisciplinary tumor board.  We discussed proceeding with possible liver directed therapy since that is the area of significant disease progression and her other sites of disease are relatively stable.  I will contact her with final treatment recommendations following her tumor board evaluation.      TIM HARDY MD     Other Physicians  Dr. Jaimee Guillen Dr.  Jamal Lozano

## 2025-04-03 NOTE — H&P (VIEW-ONLY)
Subjective:       Patient ID: Alondra Snyder is a 59 y.o. female.    Chief Complaint:  Worried about scan results    Diagnosis: Metastatic rectal cancer                    Stage IIIA rectal carcinoma 3/15 (T2 N1b M0); 3/28+ nodes     Treatment History  Oxaliplatin/Xeloda x6 completed 8/15  --> Xeloda/XRT completed 11/11/15  FOLFIRI + Bevacizumab x 10 (7/24-12/24)    Current Treatment:  FOLFOX + Bevacizumab s/p 4 cycles (started 2/24/25)     Clinical History: Patient was referred for a first time screening colonoscopy at the age of 49 by her gynecologist.  She had no abdominal symptoms or complaints, changes in her bowel habits, melena or hematochezia.  Colonoscopy revealed a malignant appearing polyp at the rectosigmoid junction.  Biopsy was positive for adenocarcinoma.  Preoperative CEA level was normal 2 ng/mL.  Chest x-ray was unremarkable.  CT A/P showed a large solid left adnexal mass measuring 4.9 x 6.1 cm.  There was no specific abnormality in the sigmoid colon or rectum and no evidence of metastatic disease.  Pelvic ultrasound confirmed that the lesion was a uterine fibroid.  She underwent a laparoscopic resection with primary reanastomosis 3/18/15.  Final pathology showed a 2 cm moderately differentiated adenocarcinoma invading into but not through the muscularis propria.  3 out of 28 lymph nodes were positive for metastatic involvement. All resection margins were clear.  At the time of surgery, the lesion was delineated to be in the upper rectum. She recovered from her surgery uneventfully.  She completed adjuvant chemotherapy and radiation as documented above. Her Mediport catheter was removed 10/17.    She was lost to follow-up from 9/19 until 6/21/23 due to the COVID-19 pandemic.  She reported no significant problems or health issues in the interim.  She had a screening colonoscopy 1/2/20 that showed a single polyp in the ascending colon which was removed with pathology showing benign polypoid colonic  mucosa with no evidence of adenoma.  Follow-up exam was recommended in 5 years.    Laboratory testing 5/31/24 prior to her annual surveillance visit showed an elevated CEA level of 10.16 ng/mL.  Testing was repeated on 6/6/24 with a level of 9.84 ng/mL.  CT C/A/P 6/10/24 showed a 7.8 cm lobulated soft tissue mass in the left upper lobe extending from the left hilum to the pleural margin.  There was a 1.5 cm right hilar node and small AP window nodes.  There was a stable nodular soft tissue density adjacent to the left uterine margin unchanged from 2019.  CT-guided biopsy 6/17/24 revealed a metastatic adenocarcinoma consistent with colorectal primary.  She had left on a trip to [place] when the results came back.  She arranged to be seen at [facility] while she was in [place].    Workup at [facility]  CT-PET scan 7/12/24:  Large DIANA hypermetabolic mass measuring up to 8.1 cm with central necrosis.  Multiple hypermetabolic hepatic metastases.  MRI abdomen 7/12/24:  At least 7 bipolar hepatic metastases, largest 3.0 cm segment MIGUEL.  MRI brain 7/12/24:  No metastatic disease.    Molecular testing:  HER2 negative.  Pathology QNS for additional molecular studies.    [facility] peripheral blood 8/7/24: +KRAS G12D, FRANCESCO.  Positive mutations of APC, PTEN, TP53 and FBXW7    She was started on palliative chemotherapy with a regimen of FOLFIRI and Bevacizumab 7/31/24.  CT C/A/P 10/21/24: DIANA mass 5.9 x 5.4 cm (previous 6.9 x 7.3 cm), left hilar LN 1.5 x 1.1 cm (previous 1.8 x 1.5 cm).  Multiple hypodense liver metastases, largest lesion left hepatic lobe 4.9 cm.        CT C/A/P 12/19/24:  Stable DIANA mass 5.8 x 5.2 cm.  Bilobar hepatic metastases, several showing slight interval enlargement.  Right adrenal nodule 1.2 cm (previous 1 cm).    CT-guided liver biopsy 1/13/25:  Metastatic adenocarcinoma consistent with colorectal primary (CK20 and CDX2+)    [facility] 2/13/25:  Minimal disease  progression on imaging 10/24 to 12/24 with slight enlargement of multiple liver lesions and stable appearance of the lung and adrenal metastases.  Recommend transitioning to FOLFOX + Bevacizumab.    CT C/A/P 4/11/25:  DIANA mass decreased 5.0 x 4.8 cm (previous 5.8 x 5.2 cm).  Progressed hepatic metastatic disease, examples anterior left hepatic lobe 4.2 x 4.0 cm (previous 2.7 x 2.3 cm), lateral right hepatic lobe 4.7 x 4.2 cm (previous 3.1 x 2.5 cm), multiple new hepatic metastases.  Right adrenal metastases 2.9 x 1.4 cm (previous 1.6 x 1.1 cm).    Interval History  She returns to clinic today for a 2 week follow-up visit accompanied by her wife.  She has completed 4 cycles of treatment with FOLFOX and bevacizumab.  She had a hypersensitivity reaction to oxaliplatin with her 3rd cycle of therapy.  Premedications were adjusted and her infusion time was length and with improved tolerance.  She remains completely asymptomatic with an ECOG performance status of 0.  Follow-up CT scans of the chest, abdomen and pelvis 4/11/25 showed progression of her hepatic metastatic disease and enlargement of the right adrenal gland metastasis.  Left upper lung nodule showed slight decrease in size as documented above.  She is very anxious regarding the results.       Review of Systems   Constitutional:  Negative for appetite change, fatigue, fever and unexpected weight change.   HENT:  Negative for nasal congestion, mouth sores, sore throat and trouble swallowing.    Eyes: Negative.    Respiratory:  Negative for cough, shortness of breath and wheezing.    Cardiovascular:  Negative for chest pain, palpitations and leg swelling.   Gastrointestinal:  Negative for abdominal distention, abdominal pain, constipation, diarrhea and nausea.   Genitourinary:  Negative for dysuria, flank pain and frequency.   Musculoskeletal:  Negative for arthralgias and back pain.   Integumentary:  Negative for pallor and rash.   Neurological:  Negative for  "dizziness, weakness, numbness and headaches.   Hematological:  Negative for adenopathy. Does not bruise/bleed easily.   Psychiatric/Behavioral: Negative.         PMHx:  Endometriosis, anxiety/depression  PSHx:  Tonsils, sinus surgery, right oophorectomy, partial colectomy  SH:  Lifetime nonsmoker, occasional alcohol use.  Lives in [place] with her significant other.  Works as a .  FH:  Mother had kidney cancer.  A maternal aunt and 1st cousin had breast cancer.    Objective:        BP (!) 147/87 (Patient Position: Sitting)   Pulse 73   Temp 97.8 °F (36.6 °C)   Resp 15   Ht 5' 6" (1.676 m)   Wt 75.9 kg (167 lb 6.4 oz)   SpO2 98%   BMI 27.02 kg/m²    Physical Exam  Constitutional:       Comments: Well-developed white female in NAD   HENT:      Head: Normocephalic.      Mouth/Throat:      Mouth: Mucous membranes are moist.      Pharynx: Oropharynx is clear. No posterior oropharyngeal erythema.   Eyes:      General: No scleral icterus.     Extraocular Movements: Extraocular movements intact.      Pupils: Pupils are equal, round, and reactive to light.   Cardiovascular:      Rate and Rhythm: Normal rate and regular rhythm.      Heart sounds: No murmur heard.     Comments: MediPort catheter right chest wall and MediPort removal incision left chest wall.  Pulmonary:      Comments: Lungs clear to auscultation  Abdominal:      General: Bowel sounds are normal. There is no distension.      Palpations: Abdomen is soft.      Tenderness: There is no abdominal tenderness.      Comments: Well-healed midline incision below umbilicus and laparoscopic sites. No palpable masses or organomegaly.   Musculoskeletal:         General: No swelling or tenderness. Normal range of motion.      Cervical back: Neck supple. No tenderness.   Skin:     General: Skin is warm and dry.      Findings: No rash.   Neurological:      General: No focal deficit present.      Mental Status: She is alert and oriented to " person, place, and time.      Cranial Nerves: No cranial nerve deficit.      Motor: No weakness.       ECOG SCORE    0 - Fully active-able to carry on all pre-disease performance without restriction          LABORATORY  Recent Results (from the past week)   CBC with Differential    Collection Time: 04/16/25  2:22 PM   Result Value Ref Range    WBC 5.77 4.50 - 11.50 x10(3)/mcL    RBC 4.86 4.20 - 5.40 x10(6)/mcL    Hgb 13.1 12.0 - 16.0 g/dL    Hct 40.9 37.0 - 47.0 %    MCV 84.2 80.0 - 94.0 fL    MCH 27.0 27.0 - 31.0 pg    MCHC 32.0 (L) 33.0 - 36.0 g/dL    RDW 19.7 (H) 11.5 - 17.0 %    Platelet 295 130 - 400 x10(3)/mcL    MPV 7.8 7.4 - 10.4 fL    Neut % 45.7 %    Lymph % 36.4 %    Mono % 12.0 %    Eos % 4.5 %    Basophil % 0.7 %    Imm Grans % 0.7 %    Neut # 2.64 2.1 - 9.2 x10(3)/mcL    Lymph # 2.10 0.6 - 4.6 x10(3)/mcL    Mono # 0.69 0.1 - 1.3 x10(3)/mcL    Eos # 0.26 0 - 0.9 x10(3)/mcL    Baso # 0.04 <=0.2 x10(3)/mcL    Imm Gran # 0.04 0.00 - 0.04 x10(3)/mcL                    8/26/24   9/18/24   9/23/24   11/13/24   12/23/24   3/18/25  CEA        23.9        17.1        16.0         11.8          11.7         52.0    Assessment:   Metastatic rectal cancer  Mild treatment-related hypertension      Plan:   CT images were reviewed in the office today in the presence of the patient and her wife.    Unfortunately, scans show progression of her hepatic metastatic disease.  She has no associated symptoms.    Her case was discussed with GI medical oncology, Dr. Lozano in [place].  Her case will be presented to the multidisciplinary tumor board.  We discussed proceeding with possible liver directed therapy since that is the area of significant disease progression and her other sites of disease are relatively stable.  I will contact her with final treatment recommendations following her tumor board evaluation.      TIM HARDY MD     Other Physicians  Dr. Jaimee Guillen Dr.  Jamal Lozano

## 2025-04-04 RX ORDER — ACETAMINOPHEN 325 MG/1
650 TABLET ORAL
Start: 2025-04-07

## 2025-04-04 RX ORDER — FAMOTIDINE 10 MG/ML
20 INJECTION, SOLUTION INTRAVENOUS
Start: 2025-04-07

## 2025-04-07 ENCOUNTER — INFUSION (OUTPATIENT)
Dept: INFUSION THERAPY | Facility: HOSPITAL | Age: 60
End: 2025-04-07
Attending: INTERNAL MEDICINE
Payer: COMMERCIAL

## 2025-04-07 VITALS
BODY MASS INDEX: 26.37 KG/M2 | DIASTOLIC BLOOD PRESSURE: 75 MMHG | SYSTOLIC BLOOD PRESSURE: 140 MMHG | RESPIRATION RATE: 18 BRPM | WEIGHT: 168 LBS | HEART RATE: 72 BPM | HEIGHT: 67 IN

## 2025-04-07 DIAGNOSIS — C78.01 MALIGNANT NEOPLASM METASTATIC TO RIGHT LUNG: ICD-10-CM

## 2025-04-07 DIAGNOSIS — C78.7 SECONDARY MALIGNANT NEOPLASM OF LIVER: ICD-10-CM

## 2025-04-07 DIAGNOSIS — C20 RECTAL CANCER: Primary | ICD-10-CM

## 2025-04-07 PROCEDURE — 96368 THER/DIAG CONCURRENT INF: CPT

## 2025-04-07 PROCEDURE — 96415 CHEMO IV INFUSION ADDL HR: CPT

## 2025-04-07 PROCEDURE — 96417 CHEMO IV INFUS EACH ADDL SEQ: CPT

## 2025-04-07 PROCEDURE — 63600175 PHARM REV CODE 636 W HCPCS: Performed by: INTERNAL MEDICINE

## 2025-04-07 PROCEDURE — 25000003 PHARM REV CODE 250: Performed by: INTERNAL MEDICINE

## 2025-04-07 PROCEDURE — 96413 CHEMO IV INFUSION 1 HR: CPT

## 2025-04-07 PROCEDURE — 96416 CHEMO PROLONG INFUSE W/PUMP: CPT

## 2025-04-07 PROCEDURE — 96367 TX/PROPH/DG ADDL SEQ IV INF: CPT

## 2025-04-07 PROCEDURE — 96411 CHEMO IV PUSH ADDL DRUG: CPT

## 2025-04-07 PROCEDURE — 96376 TX/PRO/DX INJ SAME DRUG ADON: CPT

## 2025-04-07 PROCEDURE — 96375 TX/PRO/DX INJ NEW DRUG ADDON: CPT

## 2025-04-07 RX ORDER — DIPHENHYDRAMINE HYDROCHLORIDE 50 MG/ML
50 INJECTION, SOLUTION INTRAMUSCULAR; INTRAVENOUS ONCE AS NEEDED
Status: COMPLETED | OUTPATIENT
Start: 2025-04-07 | End: 2025-04-07

## 2025-04-07 RX ORDER — PROCHLORPERAZINE EDISYLATE 5 MG/ML
10 INJECTION INTRAMUSCULAR; INTRAVENOUS ONCE AS NEEDED
Status: DISCONTINUED | OUTPATIENT
Start: 2025-04-07 | End: 2025-04-07 | Stop reason: HOSPADM

## 2025-04-07 RX ORDER — EPINEPHRINE 0.3 MG/.3ML
0.3 INJECTION SUBCUTANEOUS ONCE AS NEEDED
Status: DISCONTINUED | OUTPATIENT
Start: 2025-04-07 | End: 2025-04-07 | Stop reason: HOSPADM

## 2025-04-07 RX ORDER — ACETAMINOPHEN 325 MG/1
650 TABLET ORAL
Status: COMPLETED | OUTPATIENT
Start: 2025-04-07 | End: 2025-04-07

## 2025-04-07 RX ORDER — HEPARIN 100 UNIT/ML
500 SYRINGE INTRAVENOUS
Status: DISCONTINUED | OUTPATIENT
Start: 2025-04-07 | End: 2025-04-07 | Stop reason: HOSPADM

## 2025-04-07 RX ORDER — FLUOROURACIL 50 MG/ML
400 INJECTION, SOLUTION INTRAVENOUS
Status: COMPLETED | OUTPATIENT
Start: 2025-04-07 | End: 2025-04-07

## 2025-04-07 RX ORDER — SODIUM CHLORIDE 0.9 % (FLUSH) 0.9 %
10 SYRINGE (ML) INJECTION
Status: DISCONTINUED | OUTPATIENT
Start: 2025-04-07 | End: 2025-04-07 | Stop reason: HOSPADM

## 2025-04-07 RX ORDER — FAMOTIDINE 10 MG/ML
20 INJECTION, SOLUTION INTRAVENOUS
Status: COMPLETED | OUTPATIENT
Start: 2025-04-07 | End: 2025-04-07

## 2025-04-07 RX ORDER — DIPHENHYDRAMINE HYDROCHLORIDE 50 MG/ML
25 INJECTION, SOLUTION INTRAMUSCULAR; INTRAVENOUS
Status: COMPLETED | OUTPATIENT
Start: 2025-04-07 | End: 2025-04-07

## 2025-04-07 RX ADMIN — ACETAMINOPHEN 650 MG: 325 TABLET ORAL at 08:04

## 2025-04-07 RX ADMIN — DIPHENHYDRAMINE HYDROCHLORIDE 25 MG: 50 INJECTION INTRAMUSCULAR; INTRAVENOUS at 11:04

## 2025-04-07 RX ADMIN — SODIUM CHLORIDE 400 MG: 9 INJECTION, SOLUTION INTRAVENOUS at 08:04

## 2025-04-07 RX ADMIN — LEUCOVORIN CALCIUM 750 MG: 350 INJECTION, POWDER, LYOPHILIZED, FOR SOLUTION INTRAMUSCULAR; INTRAVENOUS at 09:04

## 2025-04-07 RX ADMIN — DEXTROSE MONOHYDRATE 165 MG: 25000 INJECTION, SOLUTION INTRAVENOUS at 09:04

## 2025-04-07 RX ADMIN — DIPHENHYDRAMINE HYDROCHLORIDE 25 MG: 50 INJECTION INTRAMUSCULAR; INTRAVENOUS at 09:04

## 2025-04-07 RX ADMIN — FAMOTIDINE 20 MG: 10 INJECTION, SOLUTION INTRAVENOUS at 09:04

## 2025-04-07 RX ADMIN — DEXAMETHASONE SODIUM PHOSPHATE 0.25 MG: 4 INJECTION, SOLUTION INTRA-ARTICULAR; INTRALESIONAL; INTRAMUSCULAR; INTRAVENOUS; SOFT TISSUE at 09:04

## 2025-04-07 RX ADMIN — FLUOROURACIL 4500 MG: 50 INJECTION, SOLUTION INTRAVENOUS at 01:04

## 2025-04-07 RX ADMIN — FLUOROURACIL 775 MG: 50 INJECTION, SOLUTION INTRAVENOUS at 01:04

## 2025-04-07 NOTE — NURSING
1050: After 1 hr of Oxaliplatin infusion develpoed red spotches on arms and upper back. Infusion stopped. Dr. Hernandez notifed.  1005: 153/81-51  Dr. Hernandez at chairside.  Ordered Benadryl 25mg  1105: Benadryl given  11:35 Redness better. Oxaliplatin re-started At 150 ml/hr

## 2025-04-09 ENCOUNTER — INFUSION (OUTPATIENT)
Dept: INFUSION THERAPY | Facility: HOSPITAL | Age: 60
End: 2025-04-09
Attending: INTERNAL MEDICINE
Payer: COMMERCIAL

## 2025-04-09 VITALS
HEIGHT: 67 IN | WEIGHT: 168 LBS | SYSTOLIC BLOOD PRESSURE: 127 MMHG | HEART RATE: 74 BPM | RESPIRATION RATE: 18 BRPM | BODY MASS INDEX: 26.37 KG/M2 | DIASTOLIC BLOOD PRESSURE: 83 MMHG

## 2025-04-09 DIAGNOSIS — C78.7 SECONDARY MALIGNANT NEOPLASM OF LIVER: ICD-10-CM

## 2025-04-09 DIAGNOSIS — C20 RECTAL CANCER: Primary | ICD-10-CM

## 2025-04-09 DIAGNOSIS — C78.01 MALIGNANT NEOPLASM METASTATIC TO RIGHT LUNG: ICD-10-CM

## 2025-04-09 PROCEDURE — 63600175 PHARM REV CODE 636 W HCPCS: Performed by: INTERNAL MEDICINE

## 2025-04-09 RX ORDER — PROCHLORPERAZINE EDISYLATE 5 MG/ML
10 INJECTION INTRAMUSCULAR; INTRAVENOUS ONCE AS NEEDED
Status: DISCONTINUED | OUTPATIENT
Start: 2025-04-09 | End: 2025-04-09 | Stop reason: HOSPADM

## 2025-04-09 RX ORDER — HEPARIN 100 UNIT/ML
500 SYRINGE INTRAVENOUS
Status: DISCONTINUED | OUTPATIENT
Start: 2025-04-09 | End: 2025-04-09 | Stop reason: HOSPADM

## 2025-04-09 RX ORDER — SODIUM CHLORIDE 0.9 % (FLUSH) 0.9 %
10 SYRINGE (ML) INJECTION
Status: DISCONTINUED | OUTPATIENT
Start: 2025-04-09 | End: 2025-04-09 | Stop reason: HOSPADM

## 2025-04-09 RX ADMIN — HEPARIN 500 UNITS: 100 SYRINGE at 12:04

## 2025-04-11 ENCOUNTER — HOSPITAL ENCOUNTER (OUTPATIENT)
Dept: RADIOLOGY | Facility: HOSPITAL | Age: 60
Discharge: HOME | End: 2025-04-11
Attending: INTERNAL MEDICINE
Payer: COMMERCIAL

## 2025-04-11 DIAGNOSIS — C78.02 MALIGNANT NEOPLASM METASTATIC TO LEFT LUNG: ICD-10-CM

## 2025-04-11 DIAGNOSIS — C20 RECTAL CANCER: ICD-10-CM

## 2025-04-11 DIAGNOSIS — C78.7 SECONDARY MALIGNANT NEOPLASM OF LIVER: ICD-10-CM

## 2025-04-11 PROCEDURE — 25500020 PHARM REV CODE 255: Performed by: INTERNAL MEDICINE

## 2025-04-11 PROCEDURE — 71260 CT THORAX DX C+: CPT | Mod: TC

## 2025-04-11 RX ORDER — DIATRIZOATE MEGLUMINE AND DIATRIZOATE SODIUM 660; 100 MG/ML; MG/ML
30 SOLUTION ORAL; RECTAL
Status: COMPLETED | OUTPATIENT
Start: 2025-04-11 | End: 2025-04-11

## 2025-04-11 RX ADMIN — IOHEXOL 100 ML: 350 INJECTION, SOLUTION INTRAVENOUS at 12:04

## 2025-04-11 RX ADMIN — DIATRIZOATE MEGLUMINE AND DIATRIZOATE SODIUM 30 ML: 660; 100 LIQUID ORAL; RECTAL at 12:04

## 2025-04-16 ENCOUNTER — OFFICE VISIT (OUTPATIENT)
Dept: HEMATOLOGY/ONCOLOGY | Facility: CLINIC | Age: 60
End: 2025-04-16
Payer: COMMERCIAL

## 2025-04-16 ENCOUNTER — LAB VISIT (OUTPATIENT)
Dept: LAB | Facility: HOSPITAL | Age: 60
End: 2025-04-16
Attending: INTERNAL MEDICINE
Payer: COMMERCIAL

## 2025-04-16 VITALS
BODY MASS INDEX: 26.9 KG/M2 | RESPIRATION RATE: 15 BRPM | WEIGHT: 167.38 LBS | DIASTOLIC BLOOD PRESSURE: 87 MMHG | HEIGHT: 66 IN | OXYGEN SATURATION: 98 % | HEART RATE: 73 BPM | TEMPERATURE: 98 F | SYSTOLIC BLOOD PRESSURE: 147 MMHG

## 2025-04-16 DIAGNOSIS — C78.01 MALIGNANT NEOPLASM METASTATIC TO RIGHT LUNG: ICD-10-CM

## 2025-04-16 DIAGNOSIS — C20 RECTAL CANCER: Primary | ICD-10-CM

## 2025-04-16 DIAGNOSIS — C78.7 SECONDARY MALIGNANT NEOPLASM OF LIVER: ICD-10-CM

## 2025-04-16 DIAGNOSIS — C78.02 MALIGNANT NEOPLASM METASTATIC TO LEFT LUNG: ICD-10-CM

## 2025-04-16 DIAGNOSIS — C20 RECTAL CANCER: ICD-10-CM

## 2025-04-16 LAB
ALBUMIN SERPL-MCNC: 3.4 G/DL (ref 3.4–4.8)
ALBUMIN/GLOB SERPL: 0.8 RATIO (ref 1.1–2)
ALP SERPL-CCNC: 193 UNIT/L (ref 40–150)
ALT SERPL-CCNC: 14 UNIT/L (ref 0–55)
ANION GAP SERPL CALC-SCNC: 8 MEQ/L
AST SERPL-CCNC: 23 UNIT/L (ref 11–45)
BASOPHILS # BLD AUTO: 0.04 X10(3)/MCL
BASOPHILS NFR BLD AUTO: 0.7 %
BILIRUB SERPL-MCNC: 0.4 MG/DL
BUN SERPL-MCNC: 11.3 MG/DL (ref 9.8–20.1)
CALCIUM SERPL-MCNC: 9.4 MG/DL (ref 8.4–10.2)
CEA SERPL-MCNC: 18.66 NG/ML (ref 0–3)
CHLORIDE SERPL-SCNC: 105 MMOL/L (ref 98–107)
CO2 SERPL-SCNC: 27 MMOL/L (ref 23–31)
CREAT SERPL-MCNC: 0.6 MG/DL (ref 0.55–1.02)
CREAT/UREA NIT SERPL: 19
EOSINOPHIL # BLD AUTO: 0.26 X10(3)/MCL (ref 0–0.9)
EOSINOPHIL NFR BLD AUTO: 4.5 %
ERYTHROCYTE [DISTWIDTH] IN BLOOD BY AUTOMATED COUNT: 19.7 % (ref 11.5–17)
GFR SERPLBLD CREATININE-BSD FMLA CKD-EPI: >60 ML/MIN/1.73/M2
GLOBULIN SER-MCNC: 4.4 GM/DL (ref 2.4–3.5)
GLUCOSE SERPL-MCNC: 92 MG/DL (ref 82–115)
HCT VFR BLD AUTO: 40.9 % (ref 37–47)
HGB BLD-MCNC: 13.1 G/DL (ref 12–16)
IMM GRANULOCYTES # BLD AUTO: 0.04 X10(3)/MCL (ref 0–0.04)
IMM GRANULOCYTES NFR BLD AUTO: 0.7 %
LYMPHOCYTES # BLD AUTO: 2.1 X10(3)/MCL (ref 0.6–4.6)
LYMPHOCYTES NFR BLD AUTO: 36.4 %
MCH RBC QN AUTO: 27 PG (ref 27–31)
MCHC RBC AUTO-ENTMCNC: 32 G/DL (ref 33–36)
MCV RBC AUTO: 84.2 FL (ref 80–94)
MONOCYTES # BLD AUTO: 0.69 X10(3)/MCL (ref 0.1–1.3)
MONOCYTES NFR BLD AUTO: 12 %
NEUTROPHILS # BLD AUTO: 2.64 X10(3)/MCL (ref 2.1–9.2)
NEUTROPHILS NFR BLD AUTO: 45.7 %
PLATELET # BLD AUTO: 295 X10(3)/MCL (ref 130–400)
PMV BLD AUTO: 7.8 FL (ref 7.4–10.4)
POTASSIUM SERPL-SCNC: 3.7 MMOL/L (ref 3.5–5.1)
PROT SERPL-MCNC: 7.8 GM/DL (ref 5.8–7.6)
RBC # BLD AUTO: 4.86 X10(6)/MCL (ref 4.2–5.4)
SODIUM SERPL-SCNC: 140 MMOL/L (ref 136–145)
WBC # BLD AUTO: 5.77 X10(3)/MCL (ref 4.5–11.5)

## 2025-04-16 PROCEDURE — 99999 PR PBB SHADOW E&M-EST. PATIENT-LVL III: CPT | Mod: PBBFAC,,, | Performed by: INTERNAL MEDICINE

## 2025-04-16 PROCEDURE — 36415 COLL VENOUS BLD VENIPUNCTURE: CPT

## 2025-04-16 PROCEDURE — 3079F DIAST BP 80-89 MM HG: CPT | Mod: CPTII,S$GLB,, | Performed by: INTERNAL MEDICINE

## 2025-04-16 PROCEDURE — 1159F MED LIST DOCD IN RCRD: CPT | Mod: CPTII,S$GLB,, | Performed by: INTERNAL MEDICINE

## 2025-04-16 PROCEDURE — 85025 COMPLETE CBC W/AUTO DIFF WBC: CPT

## 2025-04-16 PROCEDURE — 3077F SYST BP >= 140 MM HG: CPT | Mod: CPTII,S$GLB,, | Performed by: INTERNAL MEDICINE

## 2025-04-16 PROCEDURE — 3008F BODY MASS INDEX DOCD: CPT | Mod: CPTII,S$GLB,, | Performed by: INTERNAL MEDICINE

## 2025-04-16 PROCEDURE — 80053 COMPREHEN METABOLIC PANEL: CPT

## 2025-04-16 PROCEDURE — 82378 CARCINOEMBRYONIC ANTIGEN: CPT

## 2025-04-16 PROCEDURE — 1160F RVW MEDS BY RX/DR IN RCRD: CPT | Mod: CPTII,S$GLB,, | Performed by: INTERNAL MEDICINE

## 2025-04-16 PROCEDURE — 99215 OFFICE O/P EST HI 40 MIN: CPT | Mod: S$GLB,,, | Performed by: INTERNAL MEDICINE

## 2025-04-21 DIAGNOSIS — C20 RECTAL CANCER: Primary | ICD-10-CM

## 2025-04-21 DIAGNOSIS — C78.02 MALIGNANT NEOPLASM METASTATIC TO LEFT LUNG: ICD-10-CM

## 2025-04-21 DIAGNOSIS — C78.7 SECONDARY MALIGNANT NEOPLASM OF LIVER: ICD-10-CM

## 2025-04-23 ENCOUNTER — PATIENT MESSAGE (OUTPATIENT)
Dept: HEMATOLOGY/ONCOLOGY | Facility: CLINIC | Age: 60
End: 2025-04-23
Payer: COMMERCIAL

## 2025-05-13 ENCOUNTER — HOSPITAL ENCOUNTER (OUTPATIENT)
Dept: RADIOLOGY | Facility: HOSPITAL | Age: 60
Discharge: HOME | End: 2025-05-13
Attending: RADIOLOGY
Payer: COMMERCIAL

## 2025-05-13 ENCOUNTER — HOSPITAL ENCOUNTER (OUTPATIENT)
Dept: INTERVENTIONAL RADIOLOGY/VASCULAR | Facility: HOSPITAL | Age: 60
Discharge: HOME | End: 2025-05-13
Attending: INTERNAL MEDICINE
Payer: COMMERCIAL

## 2025-05-13 VITALS
OXYGEN SATURATION: 96 % | DIASTOLIC BLOOD PRESSURE: 62 MMHG | HEIGHT: 67 IN | SYSTOLIC BLOOD PRESSURE: 93 MMHG | HEART RATE: 62 BPM | TEMPERATURE: 98 F | BODY MASS INDEX: 26.06 KG/M2 | WEIGHT: 166 LBS

## 2025-05-13 DIAGNOSIS — C78.02 MALIGNANT NEOPLASM METASTATIC TO LEFT LUNG: ICD-10-CM

## 2025-05-13 DIAGNOSIS — C20 RECTAL CANCER: ICD-10-CM

## 2025-05-13 LAB
ALBUMIN SERPL-MCNC: 2.9 G/DL (ref 3.4–4.8)
ALBUMIN/GLOB SERPL: 0.5 RATIO (ref 1.1–2)
ALP SERPL-CCNC: 380 UNIT/L (ref 40–150)
ALT SERPL-CCNC: 17 UNIT/L (ref 0–55)
ANION GAP SERPL CALC-SCNC: 14 MEQ/L
AST SERPL-CCNC: 30 UNIT/L (ref 11–45)
BASOPHILS # BLD AUTO: 0.13 X10(3)/MCL
BASOPHILS NFR BLD AUTO: 1.5 %
BILIRUB SERPL-MCNC: 0.6 MG/DL
BUN SERPL-MCNC: 11.5 MG/DL (ref 9.8–20.1)
CALCIUM SERPL-MCNC: 10.1 MG/DL (ref 8.4–10.2)
CHLORIDE SERPL-SCNC: 102 MMOL/L (ref 98–107)
CO2 SERPL-SCNC: 24 MMOL/L (ref 23–31)
CREAT SERPL-MCNC: 0.63 MG/DL (ref 0.55–1.02)
CREAT/UREA NIT SERPL: 18
EOSINOPHIL # BLD AUTO: 0.61 X10(3)/MCL (ref 0–0.9)
EOSINOPHIL NFR BLD AUTO: 7.1 %
ERYTHROCYTE [DISTWIDTH] IN BLOOD BY AUTOMATED COUNT: 20.1 % (ref 11.5–17)
GFR SERPLBLD CREATININE-BSD FMLA CKD-EPI: >60 ML/MIN/1.73/M2
GLOBULIN SER-MCNC: 5.4 GM/DL (ref 2.4–3.5)
GLUCOSE SERPL-MCNC: 101 MG/DL (ref 82–115)
HCT VFR BLD AUTO: 42.1 % (ref 37–47)
HGB BLD-MCNC: 13.1 G/DL (ref 12–16)
IMM GRANULOCYTES # BLD AUTO: 0.07 X10(3)/MCL (ref 0–0.04)
IMM GRANULOCYTES NFR BLD AUTO: 0.8 %
INR PPP: 1
LYMPHOCYTES # BLD AUTO: 1.94 X10(3)/MCL (ref 0.6–4.6)
LYMPHOCYTES NFR BLD AUTO: 22.7 %
MCH RBC QN AUTO: 26.8 PG (ref 27–31)
MCHC RBC AUTO-ENTMCNC: 31.1 G/DL (ref 33–36)
MCV RBC AUTO: 86.3 FL (ref 80–94)
MONOCYTES # BLD AUTO: 1.13 X10(3)/MCL (ref 0.1–1.3)
MONOCYTES NFR BLD AUTO: 13.2 %
NEUTROPHILS # BLD AUTO: 4.67 X10(3)/MCL (ref 2.1–9.2)
NEUTROPHILS NFR BLD AUTO: 54.7 %
NRBC BLD AUTO-RTO: 0 %
PLATELET # BLD AUTO: 696 X10(3)/MCL (ref 130–400)
PMV BLD AUTO: 8 FL (ref 7.4–10.4)
POTASSIUM SERPL-SCNC: 3.8 MMOL/L (ref 3.5–5.1)
PROT SERPL-MCNC: 8.3 GM/DL (ref 5.8–7.6)
PROTHROMBIN TIME: 13.2 SECONDS (ref 12.5–14.5)
RBC # BLD AUTO: 4.88 X10(6)/MCL (ref 4.2–5.4)
SODIUM SERPL-SCNC: 140 MMOL/L (ref 136–145)
WBC # BLD AUTO: 8.55 X10(3)/MCL (ref 4.5–11.5)

## 2025-05-13 PROCEDURE — 25500020 PHARM REV CODE 255: Performed by: RADIOLOGY

## 2025-05-13 PROCEDURE — 63600175 PHARM REV CODE 636 W HCPCS: Performed by: RADIOLOGY

## 2025-05-13 PROCEDURE — 78201 LIVER IMAGING STATIC ONLY: CPT | Mod: TC

## 2025-05-13 PROCEDURE — 80053 COMPREHEN METABOLIC PANEL: CPT | Performed by: RADIOLOGY

## 2025-05-13 PROCEDURE — A9540 TC99M MAA: HCPCS | Performed by: RADIOLOGY

## 2025-05-13 PROCEDURE — 85025 COMPLETE CBC W/AUTO DIFF WBC: CPT | Performed by: RADIOLOGY

## 2025-05-13 PROCEDURE — 85610 PROTHROMBIN TIME: CPT | Performed by: RADIOLOGY

## 2025-05-13 PROCEDURE — 36415 COLL VENOUS BLD VENIPUNCTURE: CPT | Performed by: RADIOLOGY

## 2025-05-13 RX ORDER — FENTANYL CITRATE 50 UG/ML
INJECTION, SOLUTION INTRAMUSCULAR; INTRAVENOUS
Status: COMPLETED | OUTPATIENT
Start: 2025-05-13 | End: 2025-05-13

## 2025-05-13 RX ORDER — IOPAMIDOL 755 MG/ML
60 INJECTION, SOLUTION INTRAVASCULAR
Status: COMPLETED | OUTPATIENT
Start: 2025-05-13 | End: 2025-05-13

## 2025-05-13 RX ORDER — LIDOCAINE HYDROCHLORIDE 20 MG/ML
INJECTION, SOLUTION INFILTRATION; PERINEURAL
Status: COMPLETED | OUTPATIENT
Start: 2025-05-13 | End: 2025-05-13

## 2025-05-13 RX ORDER — MIDAZOLAM HYDROCHLORIDE 2 MG/2ML
INJECTION, SOLUTION INTRAMUSCULAR; INTRAVENOUS
Status: COMPLETED | OUTPATIENT
Start: 2025-05-13 | End: 2025-05-13

## 2025-05-13 RX ORDER — CEFAZOLIN SODIUM 1 G/3ML
INJECTION, POWDER, FOR SOLUTION INTRAMUSCULAR; INTRAVENOUS
Status: COMPLETED | OUTPATIENT
Start: 2025-05-13 | End: 2025-05-13

## 2025-05-13 RX ADMIN — MIDAZOLAM HYDROCHLORIDE 1 MG: 1 INJECTION, SOLUTION INTRAMUSCULAR; INTRAVENOUS at 11:05

## 2025-05-13 RX ADMIN — IOPAMIDOL 60 ML: 755 INJECTION, SOLUTION INTRAVENOUS at 12:05

## 2025-05-13 RX ADMIN — LIDOCAINE HYDROCHLORIDE 5 ML: 20 INJECTION, SOLUTION INFILTRATION; PERINEURAL at 11:05

## 2025-05-13 RX ADMIN — CEFAZOLIN 2 G: 330 INJECTION, POWDER, FOR SOLUTION INTRAMUSCULAR; INTRAVENOUS at 10:05

## 2025-05-13 RX ADMIN — KIT FOR THE PREPARATION OF TECHNETIUM TC 99M ALBUMIN AGGREGATED 3.5 MILLICURIE: 2.5 INJECTION, POWDER, FOR SOLUTION INTRAVENOUS at 12:05

## 2025-05-13 RX ADMIN — FENTANYL CITRATE 50 MCG: 50 INJECTION INTRAMUSCULAR; INTRAVENOUS at 11:05

## 2025-05-13 NOTE — DISCHARGE SUMMARY
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    For further details please see the imaging report associated.    Disposition: Home or Self Care    Follow Up: With primary care provider    Discharge Instructions:  No discharge procedures on file.       Time Spent On Discharge: 5 minutes

## 2025-05-13 NOTE — SEDATION DOCUMENTATION
Report called to primary nurse. Family updated as to patient's status. Transported to [facility] For post scan.   Pt returned to bed. Groin access site free of bleeding or hematoma.

## 2025-05-13 NOTE — DISCHARGE INSTRUCTIONS
Remove dressings in 24hrs.  - Can shower in 24hrs, use soap and water only.   -No driving for two Days  -Do not lift anything heavier than a gallon of milk for 5 days.  -Do not submerge site under water for 5 days.   -No lotions, powders, creams around site for 5 days.  - Return to the nearest emergency room if you start running a fever; have any kind of discharge coming from the site, the site looks red or swollen.  - If site starts to bleed, lay flat on the ground, apply pressure to the site and call 911.

## 2025-05-13 NOTE — INTERVAL H&P NOTE
The patient has been examined and the H&P has been reviewed:    I concur with the findings and no changes have occurred since H&P was written. Alondra Snyder is a 59 y.o. female with Rectal Cancer & Liver Mets who presents for Y90 Mapping.           There are no hospital problems to display for this patient.

## 2025-05-28 NOTE — H&P (VIEW-ONLY)
"HIPAA verified.    Mbr reports she is feeling "great. " Bulmaro does feel some fatigue and "tires out more easily" but is continuing to be active and walk. Mbr celebrated her daughters graduation from college ([employer]- in english and political science) a few weeks ago. Mbr states she is very proud of her dtr.     Bulmaro reports she will undergo Y90 procedure, Y-90 is a type of transarterial therapy that uses radioactive beads to target and treat liver tumors, on 06/10/2025.     Bulmaro states MD will assess how well the procedure works and if successful, they will repeat the procedure on another part of the liver as the whole liver cannot be treated in one session. .    Meds assessed and updated. Bulmaro has no questions or concerns at this time.  "

## 2025-06-06 DIAGNOSIS — C20 RECTAL CANCER: Primary | ICD-10-CM

## 2025-06-06 DIAGNOSIS — R05.9 COUGH, UNSPECIFIED TYPE: ICD-10-CM

## 2025-06-06 DIAGNOSIS — C78.02 MALIGNANT NEOPLASM METASTATIC TO LEFT LUNG: ICD-10-CM

## 2025-06-06 RX ORDER — TRAMADOL HYDROCHLORIDE 50 MG/1
50 TABLET, FILM COATED ORAL EVERY 8 HOURS PRN
Qty: 60 TABLET | Refills: 0 | Status: SHIPPED | OUTPATIENT
Start: 2025-06-06

## 2025-06-06 RX ORDER — HYDROCODONE POLISTIREX AND CHLORPHENIRAMINE POLISTIREX 10; 8 MG/5ML; MG/5ML
5 SUSPENSION, EXTENDED RELEASE ORAL EVERY 12 HOURS PRN
Qty: 115 ML | Refills: 0 | Status: SHIPPED | OUTPATIENT
Start: 2025-06-06

## 2025-06-10 ENCOUNTER — HOSPITAL ENCOUNTER (OUTPATIENT)
Dept: RADIOLOGY | Facility: HOSPITAL | Age: 60
Discharge: HOME | End: 2025-06-10
Attending: RADIOLOGY
Payer: COMMERCIAL

## 2025-06-10 ENCOUNTER — HOSPITAL ENCOUNTER (OUTPATIENT)
Dept: INTERVENTIONAL RADIOLOGY/VASCULAR | Facility: HOSPITAL | Age: 60
Discharge: HOME | End: 2025-06-10
Attending: RADIOLOGY
Payer: COMMERCIAL

## 2025-06-10 VITALS
HEART RATE: 88 BPM | DIASTOLIC BLOOD PRESSURE: 72 MMHG | SYSTOLIC BLOOD PRESSURE: 109 MMHG | TEMPERATURE: 97 F | OXYGEN SATURATION: 94 % | RESPIRATION RATE: 18 BRPM

## 2025-06-10 VITALS
OXYGEN SATURATION: 90 % | DIASTOLIC BLOOD PRESSURE: 67 MMHG | WEIGHT: 170.44 LBS | BODY MASS INDEX: 26.75 KG/M2 | SYSTOLIC BLOOD PRESSURE: 107 MMHG | HEIGHT: 67 IN | HEART RATE: 88 BPM

## 2025-06-10 DIAGNOSIS — C20 RECTAL CANCER: ICD-10-CM

## 2025-06-10 DIAGNOSIS — C78.02 MALIGNANT NEOPLASM METASTATIC TO LEFT LUNG: ICD-10-CM

## 2025-06-10 LAB
ALBUMIN SERPL-MCNC: 1.8 G/DL (ref 3.4–4.8)
ALBUMIN/GLOB SERPL: 0.3 RATIO (ref 1.1–2)
ALP SERPL-CCNC: 616 UNIT/L (ref 40–150)
ALT SERPL-CCNC: 17 UNIT/L (ref 0–55)
ANION GAP SERPL CALC-SCNC: 14 MEQ/L
AST SERPL-CCNC: 69 UNIT/L (ref 11–45)
BASOPHILS # BLD AUTO: 0.11 X10(3)/MCL
BASOPHILS NFR BLD AUTO: 0.6 %
BILIRUB SERPL-MCNC: 1.3 MG/DL
BUN SERPL-MCNC: 20.6 MG/DL (ref 9.8–20.1)
CALCIUM SERPL-MCNC: 9.7 MG/DL (ref 8.4–10.2)
CHLORIDE SERPL-SCNC: 94 MMOL/L (ref 98–107)
CO2 SERPL-SCNC: 27 MMOL/L (ref 23–31)
CREAT SERPL-MCNC: 0.77 MG/DL (ref 0.55–1.02)
CREAT/UREA NIT SERPL: 27
EOSINOPHIL # BLD AUTO: 0.35 X10(3)/MCL (ref 0–0.9)
EOSINOPHIL NFR BLD AUTO: 2 %
ERYTHROCYTE [DISTWIDTH] IN BLOOD BY AUTOMATED COUNT: 17.3 % (ref 11.5–17)
GFR SERPLBLD CREATININE-BSD FMLA CKD-EPI: >60 ML/MIN/1.73/M2
GLOBULIN SER-MCNC: 5.8 GM/DL (ref 2.4–3.5)
GLUCOSE SERPL-MCNC: 90 MG/DL (ref 82–115)
HCT VFR BLD AUTO: 34.7 % (ref 37–47)
HGB BLD-MCNC: 10.8 G/DL (ref 12–16)
IMM GRANULOCYTES # BLD AUTO: 0.16 X10(3)/MCL (ref 0–0.04)
IMM GRANULOCYTES NFR BLD AUTO: 0.9 %
INR PPP: 1.2
LYMPHOCYTES # BLD AUTO: 2.09 X10(3)/MCL (ref 0.6–4.6)
LYMPHOCYTES NFR BLD AUTO: 11.9 %
MCH RBC QN AUTO: 26.4 PG (ref 27–31)
MCHC RBC AUTO-ENTMCNC: 31.1 G/DL (ref 33–36)
MCV RBC AUTO: 84.8 FL (ref 80–94)
MONOCYTES # BLD AUTO: 1.81 X10(3)/MCL (ref 0.1–1.3)
MONOCYTES NFR BLD AUTO: 10.3 %
NEUTROPHILS # BLD AUTO: 13 X10(3)/MCL (ref 2.1–9.2)
NEUTROPHILS NFR BLD AUTO: 74.3 %
NRBC BLD AUTO-RTO: 0 %
PLATELET # BLD AUTO: 773 X10(3)/MCL (ref 130–400)
PMV BLD AUTO: 8.8 FL (ref 7.4–10.4)
POTASSIUM SERPL-SCNC: 3.3 MMOL/L (ref 3.5–5.1)
PROT SERPL-MCNC: 7.6 GM/DL (ref 5.8–7.6)
PROTHROMBIN TIME: 15.5 SECONDS (ref 12.5–14.5)
RBC # BLD AUTO: 4.09 X10(6)/MCL (ref 4.2–5.4)
SODIUM SERPL-SCNC: 135 MMOL/L (ref 136–145)
WBC # BLD AUTO: 17.52 X10(3)/MCL (ref 4.5–11.5)

## 2025-06-10 PROCEDURE — 36415 COLL VENOUS BLD VENIPUNCTURE: CPT | Performed by: RADIOLOGY

## 2025-06-10 PROCEDURE — 36247 INS CATH ABD/L-EXT ART 3RD: CPT | Mod: LT | Performed by: RADIOLOGY

## 2025-06-10 PROCEDURE — 37243 VASC EMBOLIZE/OCCLUDE ORGAN: CPT | Performed by: RADIOLOGY

## 2025-06-10 PROCEDURE — G0269 OCCLUSIVE DEVICE IN VEIN ART: HCPCS | Performed by: RADIOLOGY

## 2025-06-10 PROCEDURE — 25500020 PHARM REV CODE 255: Performed by: RADIOLOGY

## 2025-06-10 PROCEDURE — 77300 RADIATION THERAPY DOSE PLAN: CPT | Mod: TC | Performed by: RADIOLOGY

## 2025-06-10 PROCEDURE — 77300 RADIATION THERAPY DOSE PLAN: CPT | Mod: 26,,, | Performed by: RADIOLOGY

## 2025-06-10 PROCEDURE — 99153 MOD SED SAME PHYS/QHP EA: CPT

## 2025-06-10 PROCEDURE — 99153 MOD SED SAME PHYS/QHP EA: CPT | Performed by: RADIOLOGY

## 2025-06-10 PROCEDURE — 85610 PROTHROMBIN TIME: CPT | Performed by: RADIOLOGY

## 2025-06-10 PROCEDURE — C1760 CLOSURE DEV, VASC: HCPCS

## 2025-06-10 PROCEDURE — 77263 THER RADIOLOGY TX PLNG CPLX: CPT | Mod: ,,, | Performed by: RADIOLOGY

## 2025-06-10 PROCEDURE — 75726 ARTERY X-RAYS ABDOMEN: CPT | Mod: TC,59 | Performed by: RADIOLOGY

## 2025-06-10 PROCEDURE — 99152 MOD SED SAME PHYS/QHP 5/>YRS: CPT

## 2025-06-10 PROCEDURE — 36247 INS CATH ABD/L-EXT ART 3RD: CPT | Mod: 51,LT,, | Performed by: RADIOLOGY

## 2025-06-10 PROCEDURE — 75726 ARTERY X-RAYS ABDOMEN: CPT | Mod: 26,59,, | Performed by: RADIOLOGY

## 2025-06-10 PROCEDURE — 85025 COMPLETE CBC W/AUTO DIFF WBC: CPT | Performed by: RADIOLOGY

## 2025-06-10 PROCEDURE — 63600175 PHARM REV CODE 636 W HCPCS: Performed by: RADIOLOGY

## 2025-06-10 PROCEDURE — C2616 BRACHYTX, NON-STR,YTTRIUM-90: HCPCS

## 2025-06-10 PROCEDURE — 79445 NUCLEAR RX INTRA-ARTERIAL: CPT | Mod: 26,,, | Performed by: RADIOLOGY

## 2025-06-10 PROCEDURE — 99152 MOD SED SAME PHYS/QHP 5/>YRS: CPT | Performed by: RADIOLOGY

## 2025-06-10 PROCEDURE — 78201 LIVER IMAGING STATIC ONLY: CPT | Mod: TC

## 2025-06-10 PROCEDURE — C1887 CATHETER, GUIDING: HCPCS

## 2025-06-10 PROCEDURE — C1894 INTRO/SHEATH, NON-LASER: HCPCS

## 2025-06-10 PROCEDURE — 37243 VASC EMBOLIZE/OCCLUDE ORGAN: CPT | Mod: ,,, | Performed by: RADIOLOGY

## 2025-06-10 PROCEDURE — 79445 NUCLEAR RX INTRA-ARTERIAL: CPT | Mod: TC | Performed by: RADIOLOGY

## 2025-06-10 PROCEDURE — 80053 COMPREHEN METABOLIC PANEL: CPT | Performed by: RADIOLOGY

## 2025-06-10 PROCEDURE — C1982 CATH, PRESSURE,VALVE-OCCLU: HCPCS

## 2025-06-10 PROCEDURE — C1769 GUIDE WIRE: HCPCS

## 2025-06-10 RX ORDER — METHYLPREDNISOLONE 4 MG/1
TABLET ORAL
Qty: 21 EACH | Refills: 0 | Status: SHIPPED | OUTPATIENT
Start: 2025-06-10 | End: 2025-07-01

## 2025-06-10 RX ORDER — MIDAZOLAM HYDROCHLORIDE 2 MG/2ML
INJECTION, SOLUTION INTRAMUSCULAR; INTRAVENOUS
Status: COMPLETED | OUTPATIENT
Start: 2025-06-10 | End: 2025-06-10

## 2025-06-10 RX ORDER — SUCRALFATE 1 G/1
1 TABLET ORAL
Qty: 120 TABLET | Refills: 0 | Status: SHIPPED | OUTPATIENT
Start: 2025-06-10

## 2025-06-10 RX ORDER — CEFAZOLIN SODIUM 1 G/3ML
INJECTION, POWDER, FOR SOLUTION INTRAMUSCULAR; INTRAVENOUS
Status: COMPLETED | OUTPATIENT
Start: 2025-06-10 | End: 2025-06-10

## 2025-06-10 RX ORDER — LIDOCAINE HYDROCHLORIDE 20 MG/ML
INJECTION, SOLUTION INFILTRATION; PERINEURAL
Status: COMPLETED | OUTPATIENT
Start: 2025-06-10 | End: 2025-06-10

## 2025-06-10 RX ORDER — OMEPRAZOLE 40 MG/1
40 CAPSULE, DELAYED RELEASE ORAL DAILY
Qty: 30 CAPSULE | Refills: 0 | Status: SHIPPED | OUTPATIENT
Start: 2025-06-10 | End: 2026-06-10

## 2025-06-10 RX ORDER — METOCLOPRAMIDE 10 MG/1
10 TABLET ORAL
Qty: 120 TABLET | Refills: 0 | Status: SHIPPED | OUTPATIENT
Start: 2025-06-10

## 2025-06-10 RX ORDER — IOPAMIDOL 755 MG/ML
60 INJECTION, SOLUTION INTRAVASCULAR
Status: COMPLETED | OUTPATIENT
Start: 2025-06-10 | End: 2025-06-10

## 2025-06-10 RX ORDER — FENTANYL CITRATE 50 UG/ML
INJECTION, SOLUTION INTRAMUSCULAR; INTRAVENOUS
Status: COMPLETED | OUTPATIENT
Start: 2025-06-10 | End: 2025-06-10

## 2025-06-10 RX ADMIN — FENTANYL CITRATE 25 MCG: 50 INJECTION INTRAMUSCULAR; INTRAVENOUS at 08:06

## 2025-06-10 RX ADMIN — LIDOCAINE HYDROCHLORIDE 5 ML: 20 INJECTION, SOLUTION INFILTRATION; PERINEURAL at 08:06

## 2025-06-10 RX ADMIN — CEFAZOLIN 2 G: 330 INJECTION, POWDER, FOR SOLUTION INTRAMUSCULAR; INTRAVENOUS at 08:06

## 2025-06-10 RX ADMIN — MIDAZOLAM HYDROCHLORIDE 1 MG: 1 INJECTION, SOLUTION INTRAMUSCULAR; INTRAVENOUS at 08:06

## 2025-06-10 RX ADMIN — IOPAMIDOL 60 ML: 755 INJECTION, SOLUTION INTRAVENOUS at 09:06

## 2025-06-10 NOTE — DISCHARGE SUMMARY
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    For further details please see the imaging report associated.    Disposition: Home or Self Care    Follow Up: With primary care provider    Discharge Instructions:  No discharge procedures on file.       Time Spent On Discharge: 5 minutes

## 2025-06-10 NOTE — SEDATION DOCUMENTATION
Report called to ozzie. Transported to [facility].   Family updated. Pt returned to bed. Groin access site free of bleeding or hematoma.

## 2025-06-10 NOTE — INTERVAL H&P NOTE
The patient has been examined and the H&P has been reviewed:    I concur with the findings and no changes have occurred since H&P was written. Alondra Snyder is a 60 y.o. female with Rectal Cancer & Liver Mets who presents for Y90 Administration.           There are no hospital problems to display for this patient.

## 2025-06-10 NOTE — DISCHARGE INSTRUCTIONS
?? Post-Procedure Care Instructions  Please follow the guidelines below to ensure a smooth recovery after your procedure. If you have any questions, contact your healthcare provider.    1. Dressing Care  Remove the dressing after 24 hours unless instructed otherwise.  Keep the area clean and dry.    2. Driving  Do not drive for 2 days following your procedure.  Arrange for transportation as needed.    3. Activity Restrictions  Lifting: Avoid lifting anything heavier than a gallon of milk (approximately 8 lbs or 3.5 kg) for 5 days.  Submersion Precautions: If your access site was in the groin, Do not submerge the access site in water (e.g., no baths, hot tubs, or swimming) for 5 days. Showers are acceptable unless otherwise directed.    4. Skin Care  Do not apply lotions, creams, or powders near the access site for 5 days.    5. Warning Signs - Seek Medical Attention Immediately  Go to the nearest emergency room or contact your healthcare provider if you experience:  Fever  Redness, warmth, or swelling around the access site  Discharge or drainage from the site    6. In Case of Bleeding  If the insertion site begins to bleed:  Lie flat on your back.  Apply firm pressure directly to the site.  Call 911 immediately.

## 2025-06-19 ENCOUNTER — TELEPHONE (OUTPATIENT)
Dept: HEMATOLOGY/ONCOLOGY | Facility: CLINIC | Age: 60
End: 2025-06-19
Payer: COMMERCIAL

## 2025-06-19 ENCOUNTER — LAB VISIT (OUTPATIENT)
Dept: LAB | Facility: HOSPITAL | Age: 60
End: 2025-06-19
Attending: INTERNAL MEDICINE
Payer: COMMERCIAL

## 2025-06-19 ENCOUNTER — INFUSION (OUTPATIENT)
Dept: INFUSION THERAPY | Facility: HOSPITAL | Age: 60
End: 2025-06-19
Attending: INTERNAL MEDICINE
Payer: COMMERCIAL

## 2025-06-19 DIAGNOSIS — C78.02 MALIGNANT NEOPLASM METASTATIC TO LEFT LUNG: ICD-10-CM

## 2025-06-19 DIAGNOSIS — C20 RECTAL CANCER: ICD-10-CM

## 2025-06-19 DIAGNOSIS — C78.7 SECONDARY MALIGNANT NEOPLASM OF LIVER: ICD-10-CM

## 2025-06-19 DIAGNOSIS — C78.01 MALIGNANT NEOPLASM METASTATIC TO RIGHT LUNG: Primary | ICD-10-CM

## 2025-06-19 DIAGNOSIS — E86.0 DEHYDRATION: ICD-10-CM

## 2025-06-19 DIAGNOSIS — C78.02 MALIGNANT NEOPLASM METASTATIC TO LEFT LUNG: Primary | ICD-10-CM

## 2025-06-19 LAB
ALBUMIN SERPL-MCNC: 1.5 G/DL (ref 3.4–4.8)
ALBUMIN/GLOB SERPL: 0.3 RATIO (ref 1.1–2)
ALP SERPL-CCNC: 738 UNIT/L (ref 40–150)
ALT SERPL-CCNC: 18 UNIT/L (ref 0–55)
ANION GAP SERPL CALC-SCNC: 7 MEQ/L
AST SERPL-CCNC: 63 UNIT/L (ref 11–45)
BASOPHILS # BLD AUTO: 0.05 X10(3)/MCL
BASOPHILS NFR BLD AUTO: 0.2 %
BILIRUB SERPL-MCNC: 2.2 MG/DL
BUN SERPL-MCNC: 11.1 MG/DL (ref 9.8–20.1)
CALCIUM SERPL-MCNC: 8.7 MG/DL (ref 8.4–10.2)
CHLORIDE SERPL-SCNC: 93 MMOL/L (ref 98–107)
CO2 SERPL-SCNC: 30 MMOL/L (ref 23–31)
CREAT SERPL-MCNC: 0.53 MG/DL (ref 0.55–1.02)
CREAT/UREA NIT SERPL: 21
EOSINOPHIL # BLD AUTO: 0.38 X10(3)/MCL (ref 0–0.9)
EOSINOPHIL NFR BLD AUTO: 1.8 %
ERYTHROCYTE [DISTWIDTH] IN BLOOD BY AUTOMATED COUNT: 16.6 % (ref 11.5–17)
GFR SERPLBLD CREATININE-BSD FMLA CKD-EPI: >60 ML/MIN/1.73/M2
GLOBULIN SER-MCNC: 5 GM/DL (ref 2.4–3.5)
GLUCOSE SERPL-MCNC: 92 MG/DL (ref 82–115)
HCT VFR BLD AUTO: 31.4 % (ref 37–47)
HGB BLD-MCNC: 10.1 G/DL (ref 12–16)
IMM GRANULOCYTES # BLD AUTO: 1.08 X10(3)/MCL (ref 0–0.04)
IMM GRANULOCYTES NFR BLD AUTO: 5.1 %
LYMPHOCYTES # BLD AUTO: 1.74 X10(3)/MCL (ref 0.6–4.6)
LYMPHOCYTES NFR BLD AUTO: 8.3 %
MCH RBC QN AUTO: 26.6 PG (ref 27–31)
MCHC RBC AUTO-ENTMCNC: 32.2 G/DL (ref 33–36)
MCV RBC AUTO: 82.6 FL (ref 80–94)
MONOCYTES # BLD AUTO: 2.28 X10(3)/MCL (ref 0.1–1.3)
MONOCYTES NFR BLD AUTO: 10.8 %
NEUTROPHILS # BLD AUTO: 15.5 X10(3)/MCL (ref 2.1–9.2)
NEUTROPHILS NFR BLD AUTO: 73.8 %
PLATELET # BLD AUTO: 912 X10(3)/MCL (ref 130–400)
PMV BLD AUTO: 7.9 FL (ref 7.4–10.4)
POTASSIUM SERPL-SCNC: 3.1 MMOL/L (ref 3.5–5.1)
PROT SERPL-MCNC: 6.5 GM/DL (ref 5.8–7.6)
RBC # BLD AUTO: 3.8 X10(6)/MCL (ref 4.2–5.4)
SODIUM SERPL-SCNC: 130 MMOL/L (ref 136–145)
WBC # BLD AUTO: 21.03 X10(3)/MCL (ref 4.5–11.5)

## 2025-06-19 PROCEDURE — 36415 COLL VENOUS BLD VENIPUNCTURE: CPT

## 2025-06-19 PROCEDURE — 80053 COMPREHEN METABOLIC PANEL: CPT

## 2025-06-19 PROCEDURE — 25000003 PHARM REV CODE 250: Performed by: INTERNAL MEDICINE

## 2025-06-19 PROCEDURE — 96360 HYDRATION IV INFUSION INIT: CPT

## 2025-06-19 PROCEDURE — 63600175 PHARM REV CODE 636 W HCPCS: Performed by: INTERNAL MEDICINE

## 2025-06-19 PROCEDURE — 96361 HYDRATE IV INFUSION ADD-ON: CPT

## 2025-06-19 PROCEDURE — 85025 COMPLETE CBC W/AUTO DIFF WBC: CPT

## 2025-06-19 RX ORDER — SODIUM CHLORIDE 0.9 % (FLUSH) 0.9 %
10 SYRINGE (ML) INJECTION
Status: CANCELLED | OUTPATIENT
Start: 2025-06-19

## 2025-06-19 RX ORDER — HEPARIN 100 UNIT/ML
500 SYRINGE INTRAVENOUS
Status: DISCONTINUED | OUTPATIENT
Start: 2025-06-19 | End: 2025-06-19 | Stop reason: HOSPADM

## 2025-06-19 RX ORDER — HEPARIN 100 UNIT/ML
500 SYRINGE INTRAVENOUS
Status: CANCELLED | OUTPATIENT
Start: 2025-06-19

## 2025-06-19 RX ORDER — SODIUM CHLORIDE 0.9 % (FLUSH) 0.9 %
10 SYRINGE (ML) INJECTION
Status: DISCONTINUED | OUTPATIENT
Start: 2025-06-19 | End: 2025-06-19 | Stop reason: HOSPADM

## 2025-06-19 RX ADMIN — SODIUM CHLORIDE 1000 ML: 9 INJECTION, SOLUTION INTRAVENOUS at 01:06

## 2025-06-19 RX ADMIN — HEPARIN 500 UNITS: 100 SYRINGE at 03:06

## 2025-06-19 NOTE — TELEPHONE ENCOUNTER
Advised Zulma per Dr. Hernandez for pt to come in this afternoon for labs and hydration. She is in agreement.

## 2025-06-19 NOTE — TELEPHONE ENCOUNTER
Spoke with patient's partner, Zulma - states she has been very weak since having the Y90. No longer experiencing abdominal pain. Denies any fever, N+V or diarrhea. States she needs help moving around and ambulating. She drinks a protein smoothie every morning, but not taking in much fluid or nutrition. No f/u here. Next Y90 is 7/14/25. Please advise.

## 2025-06-20 ENCOUNTER — TELEPHONE (OUTPATIENT)
Dept: HEMATOLOGY/ONCOLOGY | Facility: CLINIC | Age: 60
End: 2025-06-20
Payer: COMMERCIAL

## 2025-06-20 VITALS
DIASTOLIC BLOOD PRESSURE: 60 MMHG | HEART RATE: 90 BPM | RESPIRATION RATE: 18 BRPM | WEIGHT: 169.75 LBS | HEIGHT: 67 IN | OXYGEN SATURATION: 93 % | SYSTOLIC BLOOD PRESSURE: 92 MMHG | TEMPERATURE: 98 F | BODY MASS INDEX: 26.64 KG/M2

## 2025-06-20 NOTE — TELEPHONE ENCOUNTER
Follow- up call to patient and spoke with Zulma. She reports patient was feeling better for a few hours after she received IV hydration here on yesterday but today she is actually feeling worse and is having profound weakness. She reports patient requires 2 people helping her to ambulate. I advised her per Dr. Hernandez that her lab results don't show anything besides what we would expect after the Y90 treatment and her symptoms warrant evaluation in ER. Zulma would rather not have to take her there but I explained that we don't have the ability to do anything more than the hydration and Dr. Hernandez recommends a more extensive evaluation than we are able to offer here in the office.

## 2025-06-21 ENCOUNTER — HOSPITAL ENCOUNTER (INPATIENT)
Facility: HOSPITAL | Age: 60
LOS: 6 days | Discharge: HOME HEALTH | DRG: 054 | End: 2025-06-27
Attending: EMERGENCY MEDICINE | Admitting: INTERNAL MEDICINE
Payer: COMMERCIAL

## 2025-06-21 DIAGNOSIS — C78.01 MALIGNANT NEOPLASM METASTATIC TO RIGHT LUNG: ICD-10-CM

## 2025-06-21 DIAGNOSIS — C79.31 SECONDARY MALIGNANT NEOPLASM OF BRAIN: ICD-10-CM

## 2025-06-21 DIAGNOSIS — C20 RECTAL CANCER: ICD-10-CM

## 2025-06-21 DIAGNOSIS — E87.6 HYPOKALEMIA: ICD-10-CM

## 2025-06-21 DIAGNOSIS — E83.42 HYPOMAGNESEMIA: ICD-10-CM

## 2025-06-21 DIAGNOSIS — D72.829 LEUKOCYTOSIS, UNSPECIFIED TYPE: Primary | ICD-10-CM

## 2025-06-21 DIAGNOSIS — E86.0 DEHYDRATION: ICD-10-CM

## 2025-06-21 DIAGNOSIS — A41.9 SEPSIS, DUE TO UNSPECIFIED ORGANISM, UNSPECIFIED WHETHER ACUTE ORGAN DYSFUNCTION PRESENT: ICD-10-CM

## 2025-06-21 DIAGNOSIS — N39.0 URINARY TRACT INFECTION WITHOUT HEMATURIA, SITE UNSPECIFIED: ICD-10-CM

## 2025-06-21 DIAGNOSIS — R53.1 WEAKNESS: ICD-10-CM

## 2025-06-21 LAB
ABS NEUT (OLG): 27.83 X10(3)/MCL (ref 2.1–9.2)
ALBUMIN SERPL-MCNC: 1.5 G/DL (ref 3.4–4.8)
ALBUMIN/GLOB SERPL: 0.3 RATIO (ref 1.1–2)
ALP SERPL-CCNC: 677 UNIT/L (ref 40–150)
ALT SERPL-CCNC: 21 UNIT/L (ref 0–55)
ANION GAP SERPL CALC-SCNC: 11 MEQ/L
ANISOCYTOSIS BLD QL SMEAR: ABNORMAL
AST SERPL-CCNC: 86 UNIT/L (ref 11–45)
BACTERIA #/AREA URNS AUTO: ABNORMAL /HPF
BILIRUB SERPL-MCNC: 2.3 MG/DL
BILIRUB UR QL STRIP.AUTO: ABNORMAL
BUN SERPL-MCNC: 8.6 MG/DL (ref 9.8–20.1)
CALCIUM SERPL-MCNC: 8.4 MG/DL (ref 8.4–10.2)
CHLORIDE SERPL-SCNC: 94 MMOL/L (ref 98–107)
CLARITY UR: ABNORMAL
CO2 SERPL-SCNC: 28 MMOL/L (ref 23–31)
COLOR UR AUTO: ABNORMAL
CREAT SERPL-MCNC: 0.47 MG/DL (ref 0.55–1.02)
CREAT/UREA NIT SERPL: 18
EOSINOPHIL NFR BLD MANUAL: 0.63 X10(3)/MCL (ref 0–0.9)
EOSINOPHIL NFR BLD MANUAL: 2 %
ERYTHROCYTE [DISTWIDTH] IN BLOOD BY AUTOMATED COUNT: 17.7 % (ref 11.5–17)
FLUAV AG UPPER RESP QL IA.RAPID: NOT DETECTED
FLUBV AG UPPER RESP QL IA.RAPID: NOT DETECTED
GFR SERPLBLD CREATININE-BSD FMLA CKD-EPI: >60 ML/MIN/1.73/M2
GLOBULIN SER-MCNC: 4.7 GM/DL (ref 2.4–3.5)
GLUCOSE SERPL-MCNC: 90 MG/DL (ref 82–115)
GLUCOSE UR QL STRIP: NORMAL
HCT VFR BLD AUTO: 27 % (ref 37–47)
HGB BLD-MCNC: 8.6 G/DL (ref 12–16)
HGB UR QL STRIP: ABNORMAL
HYALINE CASTS #/AREA URNS LPF: ABNORMAL /LPF
INSTRUMENT WBC (OLG): 31.27 X10(3)/MCL
KETONES UR QL STRIP: NEGATIVE
LACTATE SERPL-SCNC: 1.4 MMOL/L (ref 0.5–2.2)
LEUKOCYTE ESTERASE UR QL STRIP: 75
LYMPHOCYTES NFR BLD MANUAL: 0.63 X10(3)/MCL (ref 0.6–4.6)
LYMPHOCYTES NFR BLD MANUAL: 2 %
MACROCYTES BLD QL SMEAR: ABNORMAL
MAGNESIUM SERPL-MCNC: 1.5 MG/DL (ref 1.6–2.6)
MCH RBC QN AUTO: 26.2 PG (ref 27–31)
MCHC RBC AUTO-ENTMCNC: 31.9 G/DL (ref 33–36)
MCV RBC AUTO: 82.3 FL (ref 80–94)
MICROCYTES BLD QL SMEAR: ABNORMAL
MONOCYTES NFR BLD MANUAL: 2.5 X10(3)/MCL (ref 0.1–1.3)
MONOCYTES NFR BLD MANUAL: 8 %
MUCOUS THREADS URNS QL MICRO: ABNORMAL /LPF
NEUTROPHILS NFR BLD MANUAL: 89 %
NITRITE UR QL STRIP: NEGATIVE
OHS QRS DURATION: 82 MS
OHS QTC CALCULATION: 425 MS
PH UR STRIP: 6 [PH]
PLATELET # BLD AUTO: 945 X10(3)/MCL (ref 130–400)
PLATELET # BLD EST: ABNORMAL 10*3/UL
PMV BLD AUTO: 8.6 FL (ref 7.4–10.4)
POIKILOCYTOSIS BLD QL SMEAR: ABNORMAL
POTASSIUM SERPL-SCNC: 3 MMOL/L (ref 3.5–5.1)
PROT SERPL-MCNC: 6.2 GM/DL (ref 5.8–7.6)
PROT UR QL STRIP: ABNORMAL
RBC # BLD AUTO: 3.28 X10(6)/MCL (ref 4.2–5.4)
RBC #/AREA URNS AUTO: ABNORMAL /HPF
RBC MORPH BLD: ABNORMAL
SARS-COV-2 RNA RESP QL NAA+PROBE: NOT DETECTED
SODIUM SERPL-SCNC: 133 MMOL/L (ref 136–145)
SP GR UR STRIP.AUTO: 1.01 (ref 1–1.03)
SQUAMOUS #/AREA URNS LPF: ABNORMAL /HPF
STOMATOCYTES (OLG): ABNORMAL
TARGETS BLD QL SMEAR: ABNORMAL
TROPONIN I SERPL-MCNC: <0.01 NG/ML (ref 0–0.04)
UROBILINOGEN UR STRIP-ACNC: >12
WBC # BLD AUTO: 31.27 X10(3)/MCL (ref 4.5–11.5)
WBC #/AREA URNS AUTO: ABNORMAL /HPF

## 2025-06-21 PROCEDURE — 63600175 PHARM REV CODE 636 W HCPCS: Performed by: EMERGENCY MEDICINE

## 2025-06-21 PROCEDURE — 85007 BL SMEAR W/DIFF WBC COUNT: CPT | Performed by: NURSE PRACTITIONER

## 2025-06-21 PROCEDURE — 96361 HYDRATE IV INFUSION ADD-ON: CPT

## 2025-06-21 PROCEDURE — 80053 COMPREHEN METABOLIC PANEL: CPT | Performed by: NURSE PRACTITIONER

## 2025-06-21 PROCEDURE — 83735 ASSAY OF MAGNESIUM: CPT | Performed by: EMERGENCY MEDICINE

## 2025-06-21 PROCEDURE — 25000003 PHARM REV CODE 250: Performed by: INTERNAL MEDICINE

## 2025-06-21 PROCEDURE — 0240U COVID/FLU A&B PCR: CPT | Performed by: EMERGENCY MEDICINE

## 2025-06-21 PROCEDURE — 11000001 HC ACUTE MED/SURG PRIVATE ROOM

## 2025-06-21 PROCEDURE — 93005 ELECTROCARDIOGRAM TRACING: CPT

## 2025-06-21 PROCEDURE — 84484 ASSAY OF TROPONIN QUANT: CPT | Performed by: NURSE PRACTITIONER

## 2025-06-21 PROCEDURE — 21400001 HC TELEMETRY ROOM

## 2025-06-21 PROCEDURE — 87040 BLOOD CULTURE FOR BACTERIA: CPT | Performed by: EMERGENCY MEDICINE

## 2025-06-21 PROCEDURE — A4216 STERILE WATER/SALINE, 10 ML: HCPCS

## 2025-06-21 PROCEDURE — 93010 ELECTROCARDIOGRAM REPORT: CPT | Mod: ,,, | Performed by: INTERNAL MEDICINE

## 2025-06-21 PROCEDURE — 99285 EMERGENCY DEPT VISIT HI MDM: CPT | Mod: 25

## 2025-06-21 PROCEDURE — 25000003 PHARM REV CODE 250

## 2025-06-21 PROCEDURE — 81001 URINALYSIS AUTO W/SCOPE: CPT | Performed by: NURSE PRACTITIONER

## 2025-06-21 PROCEDURE — 25000003 PHARM REV CODE 250: Performed by: EMERGENCY MEDICINE

## 2025-06-21 PROCEDURE — 85027 COMPLETE CBC AUTOMATED: CPT | Performed by: NURSE PRACTITIONER

## 2025-06-21 PROCEDURE — 87086 URINE CULTURE/COLONY COUNT: CPT | Performed by: NURSE PRACTITIONER

## 2025-06-21 PROCEDURE — 83605 ASSAY OF LACTIC ACID: CPT | Performed by: EMERGENCY MEDICINE

## 2025-06-21 RX ORDER — CEFTRIAXONE 2 G/1
2 INJECTION, POWDER, FOR SOLUTION INTRAMUSCULAR; INTRAVENOUS
Status: COMPLETED | OUTPATIENT
Start: 2025-06-21 | End: 2025-06-21

## 2025-06-21 RX ORDER — WATER FOR INJECTION,STERILE
VIAL (ML) INJECTION
Status: COMPLETED
Start: 2025-06-21 | End: 2025-06-21

## 2025-06-21 RX ORDER — MAGNESIUM SULFATE HEPTAHYDRATE 40 MG/ML
2 INJECTION, SOLUTION INTRAVENOUS ONCE
Status: COMPLETED | OUTPATIENT
Start: 2025-06-21 | End: 2025-06-21

## 2025-06-21 RX ORDER — SODIUM CHLORIDE 0.9 % (FLUSH) 0.9 %
10 SYRINGE (ML) INJECTION
Status: DISCONTINUED | OUTPATIENT
Start: 2025-06-21 | End: 2025-06-27 | Stop reason: HOSPADM

## 2025-06-21 RX ORDER — POLYETHYLENE GLYCOL 3350 17 G/17G
17 POWDER, FOR SOLUTION ORAL DAILY
Status: DISCONTINUED | OUTPATIENT
Start: 2025-06-22 | End: 2025-06-27 | Stop reason: HOSPADM

## 2025-06-21 RX ORDER — METOCLOPRAMIDE 10 MG/1
10 TABLET ORAL
Status: DISCONTINUED | OUTPATIENT
Start: 2025-06-22 | End: 2025-06-22

## 2025-06-21 RX ORDER — TALC
6 POWDER (GRAM) TOPICAL NIGHTLY PRN
Status: DISCONTINUED | OUTPATIENT
Start: 2025-06-21 | End: 2025-06-27 | Stop reason: HOSPADM

## 2025-06-21 RX ORDER — SERTRALINE HYDROCHLORIDE 50 MG/1
100 TABLET, FILM COATED ORAL NIGHTLY
Status: DISCONTINUED | OUTPATIENT
Start: 2025-06-21 | End: 2025-06-27 | Stop reason: HOSPADM

## 2025-06-21 RX ORDER — ONDANSETRON HYDROCHLORIDE 2 MG/ML
4 INJECTION, SOLUTION INTRAVENOUS EVERY 8 HOURS PRN
Status: DISCONTINUED | OUTPATIENT
Start: 2025-06-21 | End: 2025-06-27 | Stop reason: HOSPADM

## 2025-06-21 RX ORDER — TRAMADOL HYDROCHLORIDE 50 MG/1
50 TABLET, FILM COATED ORAL EVERY 8 HOURS PRN
Status: DISCONTINUED | OUTPATIENT
Start: 2025-06-21 | End: 2025-06-27 | Stop reason: HOSPADM

## 2025-06-21 RX ORDER — FAMOTIDINE 20 MG/1
20 TABLET, FILM COATED ORAL 2 TIMES DAILY
Status: DISCONTINUED | OUTPATIENT
Start: 2025-06-21 | End: 2025-06-27 | Stop reason: HOSPADM

## 2025-06-21 RX ORDER — SUCRALFATE 1 G/1
1 TABLET ORAL
Status: DISCONTINUED | OUTPATIENT
Start: 2025-06-22 | End: 2025-06-27 | Stop reason: HOSPADM

## 2025-06-21 RX ORDER — HYDROCODONE POLISTIREX AND CHLORPHENIRAMINE POLISTIREX 10; 8 MG/5ML; MG/5ML
5 SUSPENSION, EXTENDED RELEASE ORAL EVERY 12 HOURS PRN
Refills: 0 | Status: DISCONTINUED | OUTPATIENT
Start: 2025-06-21 | End: 2025-06-27 | Stop reason: HOSPADM

## 2025-06-21 RX ORDER — PANTOPRAZOLE SODIUM 40 MG/1
40 TABLET, DELAYED RELEASE ORAL DAILY
Status: DISCONTINUED | OUTPATIENT
Start: 2025-06-22 | End: 2025-06-27 | Stop reason: HOSPADM

## 2025-06-21 RX ORDER — ACETAMINOPHEN 325 MG/1
650 TABLET ORAL EVERY 8 HOURS PRN
Status: DISCONTINUED | OUTPATIENT
Start: 2025-06-21 | End: 2025-06-27 | Stop reason: HOSPADM

## 2025-06-21 RX ADMIN — WATER 20 ML: 1 INJECTION INTRAMUSCULAR; INTRAVENOUS; SUBCUTANEOUS at 04:06

## 2025-06-21 RX ADMIN — HYDROCODONE POLISTIREX AND CHLORPHENIRAMINE POLISTIREX 5 ML: 10; 8 SUSPENSION, EXTENDED RELEASE ORAL at 09:06

## 2025-06-21 RX ADMIN — SODIUM CHLORIDE, POTASSIUM CHLORIDE, SODIUM LACTATE AND CALCIUM CHLORIDE 1000 ML: 600; 310; 30; 20 INJECTION, SOLUTION INTRAVENOUS at 04:06

## 2025-06-21 RX ADMIN — ACETAMINOPHEN 650 MG: 325 TABLET ORAL at 10:06

## 2025-06-21 RX ADMIN — POTASSIUM BICARBONATE 25 MEQ: 977.5 TABLET, EFFERVESCENT ORAL at 04:06

## 2025-06-21 RX ADMIN — CEFTRIAXONE SODIUM 2 G: 2 INJECTION, POWDER, FOR SOLUTION INTRAMUSCULAR; INTRAVENOUS at 04:06

## 2025-06-21 RX ADMIN — SODIUM CHLORIDE, POTASSIUM CHLORIDE, SODIUM LACTATE AND CALCIUM CHLORIDE 1000 ML: 600; 310; 30; 20 INJECTION, SOLUTION INTRAVENOUS at 02:06

## 2025-06-21 RX ADMIN — SERTRALINE HYDROCHLORIDE 100 MG: 50 TABLET ORAL at 09:06

## 2025-06-21 RX ADMIN — FAMOTIDINE 20 MG: 20 TABLET, FILM COATED ORAL at 09:06

## 2025-06-21 RX ADMIN — MAGNESIUM SULFATE HEPTAHYDRATE 2 G: 40 INJECTION, SOLUTION INTRAVENOUS at 04:06

## 2025-06-21 NOTE — FIRST PROVIDER EVALUATION
Medical screening examination initiated.  I have conducted a focused provider triage encounter, findings are as follows:    Brief history of present illness:  61y/o F presents to the ED with generalize weakness not able to stand . Hx of colon CA    There were no vitals filed for this visit.    Pertinent physical exam:  AAA x 3    Brief workup plan:   Labs/EKG    Preliminary workup initiated; this workup will be continued and followed by the physician or advanced practice provider that is assigned to the patient when roomed.

## 2025-06-21 NOTE — ED PROVIDER NOTES
Encounter Date: 6/21/2025    SCRIBE #1 NOTE: I, Luz Elena Oakley, am scribing for, and in the presence of,  Cris Saavedra MD. I have scribed the following portions of the note - the EKG reading. Other sections scribed: HPI, ROS, PE.       History     Chief Complaint   Patient presents with    Weakness     Reports had y90 embolization on 6/10 to implant radioactive seed in liver, reports was weak then but now legs have progressed to being totally unable to stand, endorses fatigue, hx colon CA with recent mets to liver and lungs after being in remission for 9 years      Patient is a 60 year old female with a history of rectal carcinoma, carpal tunnel syndrome, and anxiety presenting to the ED for progressively worse generalized weakness since 6/10/2025. Patient received Y90 embolization to implant radioactive seeds in the liver on Patricia 10, 2025. Following the procedure the patient has been unable to fully ambulate on her own due to generalized weakness. She does not normally need help with ambulating. Patient endorses a productive cough, worsening at night. She intermittently experiences shocking pain accompanied by the coughing. She endorses intermittent SOB, fatigue, and dark urination every 12 hours. Patient is slightly more pale than normal. She denies any changes in bowel movements, chest pain with taking a breath, dysuria, pain after eating/drinking, or any chemotherapy infusions since March 2025.     Oncologist: Ricardo Hernandez MD      The history is provided by the patient and a caregiver. No  was used.     Review of patient's allergies indicates:  No Known Allergies  Past Medical History:   Diagnosis Date    Anxiety disorder, unspecified     Carpal tunnel syndrome     Malignant neoplasm of rectum     Rectal carcinoma      Past Surgical History:   Procedure Laterality Date    ADENOIDECTOMY  1968    BREAST SURGERY  2010    benign cyst removed    COLON RESECTION      COLON SURGERY  2015     COLONOSCOPY      HYSTERECTOMY  1993    only had one ovary removed, due to endometriosis    removal of right ovary Right     SINUS SURGERY      TONSILLECTOMY  1968     Family History   Problem Relation Name Age of Onset    Cancer Mother Clare Snyder         renal    Early death Father Frank Snyder     Cirrhosis Father Frank Snyder     Diabetes Brother Jaiden Snyder     Diabetes Brother Wilbur Snyder      Social History[1]  Review of Systems   Constitutional:  Positive for fatigue.        Endorses generalized weakness.    Respiratory:  Positive for cough (Productive).         Endorses intermittent shocking pain accompanied by coughing.    Cardiovascular:  Negative for chest pain.   Gastrointestinal:         Endorses normal bowel movements.    Genitourinary:  Negative for dysuria.        Endorses dark urination every 12 hours.    Skin:  Positive for pallor (Slight).       Physical Exam     Initial Vitals [06/21/25 1319]   BP Pulse Resp Temp SpO2   91/63 86 16 98.7 °F (37.1 °C) (!) 91 %      MAP       --         Physical Exam    Nursing note and vitals reviewed.  Constitutional: She appears well-developed and well-nourished. She is not diaphoretic. No distress.   Generalized weakness.   Requiring assistance to ambulate.    HENT:   Head: Normocephalic and atraumatic.   Nose: Nose normal. Mouth/Throat: Oropharynx is clear and moist.   Eyes: Conjunctivae and EOM are normal. Pupils are equal, round, and reactive to light.   Neck: Trachea normal. Neck supple.   Normal range of motion.  Cardiovascular:  Normal rate, regular rhythm, normal heart sounds and intact distal pulses.           No murmur heard.  Pulmonary/Chest: Breath sounds normal. No respiratory distress. She has no wheezes. She has no rhonchi. She has no rales. She exhibits no tenderness.   Abdominal: Abdomen is soft. Bowel sounds are normal. She exhibits no distension and no mass. There is no abdominal tenderness. There is no rebound and no guarding.   Musculoskeletal:          General: No tenderness or edema. Normal range of motion.      Cervical back: Normal range of motion and neck supple.      Lumbar back: Normal. Normal range of motion.     Neurological: She is alert and oriented to person, place, and time. She has normal strength. No cranial nerve deficit or sensory deficit.   Skin: Skin is warm and dry. Capillary refill takes less than 2 seconds. No abscess noted. No erythema. There is pallor (Little).   Psychiatric: She has a normal mood and affect. Her behavior is normal. Judgment and thought content normal.         ED Course   Critical Care    Date/Time: 6/21/2025 3:58 PM    Performed by: Cris Saavedra MD  Authorized by: Cris Saavedra MD  Direct patient critical care time: 45 minutes  Total critical care time (exclusive of procedural time) : 45 minutes  Critical care was necessary to treat or prevent imminent or life-threatening deterioration of the following conditions: dehydration and sepsis.  Critical care was time spent personally by me on the following activities: development of treatment plan with patient or surrogate, discussions with consultants, evaluation of patient's response to treatment, examination of patient, obtaining history from patient or surrogate, ordering and performing treatments and interventions, ordering and review of laboratory studies, ordering and review of radiographic studies, pulse oximetry, re-evaluation of patient's condition and review of old charts.        Labs Reviewed   COMPREHENSIVE METABOLIC PANEL - Abnormal       Result Value    Sodium 133 (*)     Potassium 3.0 (*)     Chloride 94 (*)     CO2 28      Glucose 90      Blood Urea Nitrogen 8.6 (*)     Creatinine 0.47 (*)     Calcium 8.4      Protein Total 6.2      Albumin 1.5 (*)     Globulin 4.7 (*)     Albumin/Globulin Ratio 0.3 (*)     Bilirubin Total 2.3 (*)      (*)     ALT 21      AST 86 (*)     eGFR >60      Anion Gap 11.0      BUN/Creatinine Ratio 18      URINALYSIS, REFLEX TO URINE CULTURE - Abnormal    Color, UA Dark-Yellow      Appearance, UA Turbid (*)     Specific Gravity, UA 1.015      pH, UA 6.0      Protein, UA 1+ (*)     Glucose, UA Normal      Ketones, UA Negative      Blood, UA 3+ (*)     Bilirubin, UA 1+ (*)     Urobilinogen, UA >12.0 (*)     Nitrites, UA Negative      Leukocyte Esterase, UA 75 (*)     RBC, UA 21-50 (*)     WBC, UA 11-20 (*)     Bacteria, UA Occasional (*)     Squamous Epithelial Cells, UA Trace      Mucous, UA Trace (*)     Hyaline Casts, UA 3-5 (*)    CBC WITH DIFFERENTIAL - Abnormal    WBC 31.27 (*)     RBC 3.28 (*)     Hgb 8.6 (*)     Hct 27.0 (*)     MCV 82.3      MCH 26.2 (*)     MCHC 31.9 (*)     RDW 17.7 (*)     Platelet 945 (*)     MPV 8.6     MANUAL DIFFERENTIAL - Abnormal    WBC 31.27      Neutrophils % 89      Lymphs % 2      Monocytes % 8      Eosinophils % 2      Neutrophils Abs 27.8303 (*)     Lymphs Abs 0.6254      Monocytes Abs 2.5016 (*)     Eosinophils Abs 0.6254      Platelets Increased (*)     RBC Morph Abnormal (*)     Poikilocytosis 2+ (*)     Anisocytosis 1+ (*)     Microcytosis 1+ (*)     Macrocytosis 1+ (*)     Target Cells 2+ (*)     Stomatocytes 2+ (*)    MAGNESIUM - Abnormal    Magnesium Level 1.50 (*)    TROPONIN I - Normal    Troponin-I <0.010     COVID/FLU A&B PCR - Normal    Influenza A PCR Not Detected      Influenza B PCR Not Detected      SARS-CoV-2 PCR Not Detected      Narrative:     The Xpert Xpress SARS-CoV-2/FLU/RSV plus is a rapid, multiplexed real-time PCR test intended for the simultaneous qualitative detection and differentiation of SARS-CoV-2, Influenza A, Influenza B, and respiratory syncytial virus (RSV) viral RNA in either nasopharyngeal swab or nasal swab specimens.         LACTIC ACID, PLASMA - Normal    Lactic Acid Level 1.4     CULTURE, URINE   BLOOD CULTURE [facility]   BLOOD CULTURE [facility]   CBC W/ AUTO DIFFERENTIAL    Narrative:     The following orders were created for panel order CBC Auto  Differential.  Procedure                               Abnormality         Status                     ---------                               -----------         ------                     CBC with Differential[[id number]]       Abnormal            Final result               Manual Differential[[id number]]         Abnormal            Final result                 Please view results for these tests on the individual orders.     EKG Readings: (Independently Interpreted)   Initial Reading: No STEMI. Rhythm: Normal Sinus Rhythm. Heart Rate: 91. Ectopy: PACs. Conduction: Normal. ST Segments: Normal ST Segments. T Waves: Normal. Axis: Normal. Clinical Impression: Normal Sinus Rhythm with PACs   1321  Poor R-Wave progression.        Imaging Results              X-Ray Chest AP Portable (Final result)  Result time 06/21/25 14:36:18      Final result by Johan Keith MD (06/21/25 14:36:18)                   Impression:      Suspect some right basilar atelectasis.  Similar left upper lobe mass.      Electronically signed by: Johan Keith  Date:    06/21/2025  Time:    14:36               Narrative:    EXAMINATION:  XR CHEST AP PORTABLE    CLINICAL HISTORY:  Weakness    COMPARISON:  17 June 2024    FINDINGS:  Frontal view of the chest was obtained. Right Port-A-Cath tip overlies the distal SVC.  The heart is not enlarged.  Similar mass left upper lung.  There is right basilar atelectasis.  No pneumothorax.                                    X-Rays:   Independently Interpreted Readings:   Chest X-Ray: Normal heart size.  No infiltrates. There is a lung mass present in the DIANA.     Medications   lactated ringers bolus 1,000 mL (1,000 mLs Intravenous New Bag 6/21/25 1606)   magnesium sulfate 2g in water 50mL IVPB (premix) (2 g Intravenous New Bag 6/21/25 1626)   sodium chloride 0.9% flush 10 mL (has no administration in time range)   melatonin tablet 6 mg (has no administration in time range)   acetaminophen tablet 650 mg (has  no administration in time range)   famotidine tablet 20 mg (has no administration in time range)   polyethylene glycol packet 17 g (has no administration in time range)   ondansetron injection 4 mg (has no administration in time range)   lactated ringers bolus 1,000 mL (0 mLs Intravenous Stopped 6/21/25 1510)   cefTRIAXone injection 2 g (2 g Intravenous Given 6/21/25 1605)   potassium bicarbonate disintegrating tablet 25 mEq (25 mEq Oral Given 6/21/25 1624)   sterile water for injection injection (20 mLs  Given 6/21/25 1605)     Medical Decision Making  Differential diagnosis includes, but is not limited to, failure to thrive, dehydration, electrolyte abnormality, and anemia.  Pneumonia, sepsis, uti  Cbc, cmp, lactic, trop, mag, ua, cultures, flu/covid, EKG, cxr ordered and reviewed  Significant leukocytosis worsened compared to previous with hypokalemia and hypomagnesemia, lactic ok  Uti, ivf, rocephin, admit    Problems Addressed:  Dehydration: acute illness or injury that poses a threat to life or bodily functions  Hypokalemia: acute illness or injury that poses a threat to life or bodily functions  Hypomagnesemia: acute illness or injury that poses a threat to life or bodily functions  Leukocytosis, unspecified type: acute illness or injury that poses a threat to life or bodily functions  Sepsis, due to unspecified organism, unspecified whether acute organ dysfunction present: acute illness or injury that poses a threat to life or bodily functions  Urinary tract infection without hematuria, site unspecified: acute illness or injury that poses a threat to life or bodily functions  Weakness: acute illness or injury that poses a threat to life or bodily functions    Amount and/or Complexity of Data Reviewed  External Data Reviewed: labs and notes.     Details: Outpt visits for treatment of cancer  Labs: ordered.  Radiology: ordered and independent interpretation performed.  ECG/medicine tests: ordered and  independent interpretation performed.     Details: EKG Readings: (Independently Interpreted)   Initial Reading: No STEMI. Rhythm: Normal Sinus Rhythm. Heart Rate: 91. Ectopy: PACs. Conduction: Normal. ST Segments: Normal ST Segments. T Waves: Normal. Axis: Normal. Clinical Impression: Normal Sinus Rhythm with PACs   1321  Poor R-Wave progression.      Risk  OTC drugs.  Prescription drug management.  Decision regarding hospitalization.    Critical Care  Total time providing critical care: 45 minutes            Scribe Attestation:   Scribe #1: I performed the above scribed service and the documentation accurately describes the services I performed. I attest to the accuracy of the note.  Comments: Attending:   Physician Attestation Statement for Scribe #1: Cris MORRIS MD, personally performed the services described in this documentation. All medical record entries made by the scribe were at my direction and in my presence.  I have reviewed the chart and agree that the record reflects my personal performance and is accurate and complete.        Attending Attestation:           Physician Attestation for Scribe:  Physician Attestation Statement for Scribe #1: Keri MORRIS Brooke R, MD, reviewed documentation, as scribed by Luz Elena Oakley in my presence, and it is both accurate and complete.             ED Course as of 06/21/25 1643   Sat Jun 21, 2025   1411 EKG obtained at 1:21 p.m. rate of 91 sinus rhythm with PACs and poor R-wave progression [BS]      ED Course User Index  [BS] Cris Saavedra MD               Medical Decision Making:   History:   Old Medical Records: I decided to obtain old medical records.  Old Records Summarized: records from previous admission(s) and records from clinic visits.  Independently Interpreted Test(s):   I have ordered and independently interpreted X-rays - see prior notes.  I have ordered and independently interpreted EKG Reading(s) - see prior notes  Clinical Tests:   Lab  "Tests: Ordered and Reviewed  Radiological Study: Ordered and Reviewed  Medical Tests: Ordered and Reviewed  Sepsis Perfusion Assessment: "I attest a sepsis perfusion exam was performed within 6 hours of sepsis, severe sepsis, or septic shock presentation, following fluid resuscitation."    Sepsis Perfusion Assessment Complete: 6/21/2025 4:43 PM    Other:   I have discussed this case with another health care provider.             Clinical Impression:  Final diagnoses:  [R53.1] Weakness  [D72.829] Leukocytosis, unspecified type (Primary)  [A41.9] Sepsis, due to unspecified organism, unspecified whether acute organ dysfunction present  [E86.0] Dehydration  [E87.6] Hypokalemia  [E83.42] Hypomagnesemia  [N39.0] Urinary tract infection without hematuria, site unspecified          ED Disposition Condition    Admit                       [1]   Social History  Tobacco Use    Smoking status: Never    Smokeless tobacco: Never   Substance Use Topics    Alcohol use: Not Currently     Alcohol/week: 2.0 standard drinks of alcohol    Drug use: Never        Cris Saavedra MD  06/21/25 4078    "

## 2025-06-22 LAB
ALBUMIN SERPL-MCNC: 1.3 G/DL (ref 3.4–4.8)
ALBUMIN/GLOB SERPL: 0.3 RATIO (ref 1.1–2)
ALP SERPL-CCNC: 567 UNIT/L (ref 40–150)
ALT SERPL-CCNC: 17 UNIT/L (ref 0–55)
ANION GAP SERPL CALC-SCNC: 7 MEQ/L
AST SERPL-CCNC: 64 UNIT/L (ref 11–45)
BASOPHILS # BLD AUTO: 0.14 X10(3)/MCL
BASOPHILS NFR BLD AUTO: 0.5 %
BILIRUB SERPL-MCNC: 2.1 MG/DL
BUN SERPL-MCNC: 8.1 MG/DL (ref 9.8–20.1)
CALCIUM SERPL-MCNC: 8.2 MG/DL (ref 8.4–10.2)
CHLORIDE SERPL-SCNC: 96 MMOL/L (ref 98–107)
CO2 SERPL-SCNC: 30 MMOL/L (ref 23–31)
CREAT SERPL-MCNC: 0.49 MG/DL (ref 0.55–1.02)
CREAT/UREA NIT SERPL: 17
EOSINOPHIL # BLD AUTO: 0.13 X10(3)/MCL (ref 0–0.9)
EOSINOPHIL NFR BLD AUTO: 0.5 %
ERYTHROCYTE [DISTWIDTH] IN BLOOD BY AUTOMATED COUNT: 18 % (ref 11.5–17)
GFR SERPLBLD CREATININE-BSD FMLA CKD-EPI: >60 ML/MIN/1.73/M2
GLOBULIN SER-MCNC: 4.2 GM/DL (ref 2.4–3.5)
GLUCOSE SERPL-MCNC: 110 MG/DL (ref 82–115)
HCT VFR BLD AUTO: 27 % (ref 37–47)
HGB BLD-MCNC: 8.4 G/DL (ref 12–16)
IMM GRANULOCYTES # BLD AUTO: 0.93 X10(3)/MCL (ref 0–0.04)
IMM GRANULOCYTES NFR BLD AUTO: 3.6 %
LYMPHOCYTES # BLD AUTO: 1.12 X10(3)/MCL (ref 0.6–4.6)
LYMPHOCYTES NFR BLD AUTO: 4.4 %
MCH RBC QN AUTO: 26 PG (ref 27–31)
MCHC RBC AUTO-ENTMCNC: 31.1 G/DL (ref 33–36)
MCV RBC AUTO: 83.6 FL (ref 80–94)
MONOCYTES # BLD AUTO: 2.75 X10(3)/MCL (ref 0.1–1.3)
MONOCYTES NFR BLD AUTO: 10.8 %
NEUTROPHILS # BLD AUTO: 20.42 X10(3)/MCL (ref 2.1–9.2)
NEUTROPHILS NFR BLD AUTO: 80.2 %
NRBC BLD AUTO-RTO: 0 %
PLATELET # BLD AUTO: 777 X10(3)/MCL (ref 130–400)
PMV BLD AUTO: 8.5 FL (ref 7.4–10.4)
POTASSIUM SERPL-SCNC: 3.3 MMOL/L (ref 3.5–5.1)
PROT SERPL-MCNC: 5.5 GM/DL (ref 5.8–7.6)
RBC # BLD AUTO: 3.23 X10(6)/MCL (ref 4.2–5.4)
SODIUM SERPL-SCNC: 133 MMOL/L (ref 136–145)
WBC # BLD AUTO: 25.49 X10(3)/MCL (ref 4.5–11.5)

## 2025-06-22 PROCEDURE — 63600175 PHARM REV CODE 636 W HCPCS: Performed by: INTERNAL MEDICINE

## 2025-06-22 PROCEDURE — 25500020 PHARM REV CODE 255: Performed by: INTERNAL MEDICINE

## 2025-06-22 PROCEDURE — 21400001 HC TELEMETRY ROOM

## 2025-06-22 PROCEDURE — 85025 COMPLETE CBC W/AUTO DIFF WBC: CPT | Performed by: EMERGENCY MEDICINE

## 2025-06-22 PROCEDURE — 27000221 HC OXYGEN, UP TO 24 HOURS

## 2025-06-22 PROCEDURE — 25000003 PHARM REV CODE 250: Performed by: INTERNAL MEDICINE

## 2025-06-22 PROCEDURE — 25000003 PHARM REV CODE 250: Performed by: EMERGENCY MEDICINE

## 2025-06-22 PROCEDURE — 36415 COLL VENOUS BLD VENIPUNCTURE: CPT | Performed by: EMERGENCY MEDICINE

## 2025-06-22 PROCEDURE — 99223 1ST HOSP IP/OBS HIGH 75: CPT | Mod: ,,, | Performed by: INTERNAL MEDICINE

## 2025-06-22 PROCEDURE — 80053 COMPREHEN METABOLIC PANEL: CPT | Performed by: EMERGENCY MEDICINE

## 2025-06-22 PROCEDURE — 51798 US URINE CAPACITY MEASURE: CPT

## 2025-06-22 PROCEDURE — 51702 INSERT TEMP BLADDER CATH: CPT

## 2025-06-22 RX ORDER — CEFTRIAXONE 1 G/1
1 INJECTION, POWDER, FOR SOLUTION INTRAMUSCULAR; INTRAVENOUS
Status: DISCONTINUED | OUTPATIENT
Start: 2025-06-22 | End: 2025-06-24

## 2025-06-22 RX ORDER — MUPIROCIN 20 MG/G
OINTMENT TOPICAL 2 TIMES DAILY
Status: DISPENSED | OUTPATIENT
Start: 2025-06-22 | End: 2025-06-27

## 2025-06-22 RX ORDER — POTASSIUM CHLORIDE 750 MG/1
10 TABLET, EXTENDED RELEASE ORAL 2 TIMES DAILY
Status: DISCONTINUED | OUTPATIENT
Start: 2025-06-22 | End: 2025-06-27 | Stop reason: HOSPADM

## 2025-06-22 RX ADMIN — POLYETHYLENE GLYCOL 3350 17 G: 17 POWDER, FOR SOLUTION ORAL at 10:06

## 2025-06-22 RX ADMIN — SERTRALINE HYDROCHLORIDE 100 MG: 50 TABLET ORAL at 09:06

## 2025-06-22 RX ADMIN — FAMOTIDINE 20 MG: 20 TABLET, FILM COATED ORAL at 09:06

## 2025-06-22 RX ADMIN — POTASSIUM CHLORIDE 10 MEQ: 750 TABLET, FILM COATED, EXTENDED RELEASE ORAL at 09:06

## 2025-06-22 RX ADMIN — SUCRALFATE 1 G: 1 TABLET ORAL at 05:06

## 2025-06-22 RX ADMIN — ACETAMINOPHEN 650 MG: 325 TABLET ORAL at 06:06

## 2025-06-22 RX ADMIN — FAMOTIDINE 20 MG: 20 TABLET, FILM COATED ORAL at 10:06

## 2025-06-22 RX ADMIN — SUCRALFATE 1 G: 1 TABLET ORAL at 10:06

## 2025-06-22 RX ADMIN — CEFTRIAXONE SODIUM 1 G: 1 INJECTION, POWDER, FOR SOLUTION INTRAMUSCULAR; INTRAVENOUS at 09:06

## 2025-06-22 RX ADMIN — MUPIROCIN: 20 OINTMENT TOPICAL at 10:06

## 2025-06-22 RX ADMIN — PANTOPRAZOLE SODIUM 40 MG: 40 TABLET, DELAYED RELEASE ORAL at 10:06

## 2025-06-22 RX ADMIN — MUPIROCIN: 20 OINTMENT TOPICAL at 09:06

## 2025-06-22 RX ADMIN — POTASSIUM CHLORIDE 10 MEQ: 750 TABLET, FILM COATED, EXTENDED RELEASE ORAL at 10:06

## 2025-06-22 RX ADMIN — METOCLOPRAMIDE 10 MG: 10 TABLET ORAL at 05:06

## 2025-06-22 RX ADMIN — SUCRALFATE 1 G: 1 TABLET ORAL at 09:06

## 2025-06-22 RX ADMIN — SUCRALFATE 1 G: 1 TABLET ORAL at 04:06

## 2025-06-22 RX ADMIN — IOHEXOL 100 ML: 350 INJECTION, SOLUTION INTRAVENOUS at 09:06

## 2025-06-22 NOTE — H&P
Chief complaint: Profound weakness    The patient is a 60-year-old woman with colorectal cancer metastatic to the lung and liver.  Original tumor dates back 11 years ago.  She did have positive nodes at that time and did receive chemotherapy and radiation therapy and did well for quite some time.  However the metastatic tumor developed in the liver and lung.  That has sounded like the lung disease was stable but liver disease was progressing.  She did receive embolization procedures by Interventional Radiology recently.  Once last month and again 12 days ago.  Since that time she has had increasing weakness to the point where she can no longer ambulate.  She really has not had much in the way of symptoms except to mentioned and occasional cough usually productive of clear phlegm, low-grade fever, dark urine.    Home meds are sertraline 100 daily, Tussionex PRN cough, Reglan 10 q.i.d., omeprazole 40 daily, Carafate 1 g q.i.d., and PRN tramadol.  All the GI meds were started recently after the embolization procedure.    No known allergies     Past medical history notable for colon cancer treated with surgery as well as chemo and radiation as noted above with metastases 10 years later.  History of uterine fibroids and history of oophorectomy.    She is a lifelong nonsmoker.  She worked as a teacher.    Review of systems notable for stable weight over the past few months if not increasing.  Low-grade fever only.  Some night sweats.  No unusual headaches and no dysphagia.  No thyroid disease or diabetes.  No anginal-type chest pain.  No orthopnea PND.  She does have some occasional shortness a breath.  Very mild.  No significant edema.  The above-mentioned cough without hemoptysis.  She has had some vague abdominal discomfort more pleuritic in nature than anything in the left upper abdomen.  Very intermittent and without recent change.  No nausea or vomiting.  No change in bowel habits.  No blood in his stools.  No  urgency frequency dysuria.  The urine has been dark recently.    Physical exam:  She is currently afebrile but last night she spiked to 102.8° heart rate 76, blood pressure 91/53.  O2 sats were in the low 90s on room air and 96 on 2 L nasal cannula.    General appearance is unremarkable.  She is quite comfortable lying in bed on nasal cannula.  HEENT exam reveals clear sclerae pupils reactive throat clear neck is supple without thyromegaly or adenopathy.  Heart has a regular rate and rhythm.  Lungs clear.  Markedly diminished breath sounds right base.  Abdomen nontender.  Extremities without clubbing cyanosis and only trace pretibial edema bilaterally.  Foot pulses are palpable.    Laboratory studies notable for [place] count of 255[address].  That has 31,000 yesterday.  H&H is 8.4 and 27.  Platelets 777.  Chemistry profile notable for low potassium at 3.3.  BUN creatinine normal.  Alk-phos elevated at 567.  Albumin extremely low at 1.3.  Total bilirubin 2.1, SGOT 64 and SGPT 17.  Globulin level 4.2.  Troponin lactic acid flu swab COVID swabs were negative yesterday.  Urine test has 21-50 red cells and 11-20 white cells.  Urine culture less than 34582 g negative rods.  Blood cultures pending.  Chest x-ray shows a large mass left upper chest  a markedly elevated right hemidiaphragm compared to chest x-ray 1 year ago.      Assessment: 1. Severe fatigue.  Likely related to the malignancy and/or treatment thereof.      2. And no carcinoma colon metastatic to the liver status post embolization type procedure.    3. Large but stable lung mass, also metastatic from the colon cancer    4. Acute urinary retention    5. History of anxiety disorder.    6. Mild cough.  Certainly could be tumor related.  Given her clinical situation I think we do need to rule out pulmonary emboli.    Plan: Continue current antibiotics.  General supportive care.  Samuels catheter until she is more active.  Add physical therapy.  Oncology consult.  We will  get CT chest PE protocol.

## 2025-06-23 LAB
ALBUMIN SERPL-MCNC: 1.4 G/DL (ref 3.4–4.8)
ALBUMIN/GLOB SERPL: 0.3 RATIO (ref 1.1–2)
ALP SERPL-CCNC: 599 UNIT/L (ref 40–150)
ALT SERPL-CCNC: 16 UNIT/L (ref 0–55)
ANION GAP SERPL CALC-SCNC: 9 MEQ/L
AST SERPL-CCNC: 53 UNIT/L (ref 11–45)
BASOPHILS # BLD AUTO: 0.13 X10(3)/MCL
BASOPHILS NFR BLD AUTO: 0.6 %
BILIRUB SERPL-MCNC: 2.2 MG/DL
BUN SERPL-MCNC: 7.7 MG/DL (ref 9.8–20.1)
CALCIUM SERPL-MCNC: 8.2 MG/DL (ref 8.4–10.2)
CHLORIDE SERPL-SCNC: 94 MMOL/L (ref 98–107)
CO2 SERPL-SCNC: 28 MMOL/L (ref 23–31)
CREAT SERPL-MCNC: 0.48 MG/DL (ref 0.55–1.02)
CREAT/UREA NIT SERPL: 16
EOSINOPHIL # BLD AUTO: 0.34 X10(3)/MCL (ref 0–0.9)
EOSINOPHIL NFR BLD AUTO: 1.5 %
ERYTHROCYTE [DISTWIDTH] IN BLOOD BY AUTOMATED COUNT: 18.4 % (ref 11.5–17)
GFR SERPLBLD CREATININE-BSD FMLA CKD-EPI: >60 ML/MIN/1.73/M2
GLOBULIN SER-MCNC: 4.6 GM/DL (ref 2.4–3.5)
GLUCOSE SERPL-MCNC: 83 MG/DL (ref 82–115)
HCT VFR BLD AUTO: 27.9 % (ref 37–47)
HGB BLD-MCNC: 8.9 G/DL (ref 12–16)
IMM GRANULOCYTES # BLD AUTO: 0.48 X10(3)/MCL (ref 0–0.04)
IMM GRANULOCYTES NFR BLD AUTO: 2.1 %
LYMPHOCYTES # BLD AUTO: 1.02 X10(3)/MCL (ref 0.6–4.6)
LYMPHOCYTES NFR BLD AUTO: 4.5 %
MCH RBC QN AUTO: 26.2 PG (ref 27–31)
MCHC RBC AUTO-ENTMCNC: 31.9 G/DL (ref 33–36)
MCV RBC AUTO: 82.1 FL (ref 80–94)
MONOCYTES # BLD AUTO: 2.33 X10(3)/MCL (ref 0.1–1.3)
MONOCYTES NFR BLD AUTO: 10.2 %
NEUTROPHILS # BLD AUTO: 18.49 X10(3)/MCL (ref 2.1–9.2)
NEUTROPHILS NFR BLD AUTO: 81.1 %
NRBC BLD AUTO-RTO: 0 %
PLATELET # BLD AUTO: 893 X10(3)/MCL (ref 130–400)
PMV BLD AUTO: 8.3 FL (ref 7.4–10.4)
POTASSIUM SERPL-SCNC: 3.4 MMOL/L (ref 3.5–5.1)
PROT SERPL-MCNC: 6 GM/DL (ref 5.8–7.6)
RBC # BLD AUTO: 3.4 X10(6)/MCL (ref 4.2–5.4)
SODIUM SERPL-SCNC: 131 MMOL/L (ref 136–145)
WBC # BLD AUTO: 22.79 X10(3)/MCL (ref 4.5–11.5)

## 2025-06-23 PROCEDURE — 36415 COLL VENOUS BLD VENIPUNCTURE: CPT | Performed by: INTERNAL MEDICINE

## 2025-06-23 PROCEDURE — 99900035 HC TECH TIME PER 15 MIN (STAT)

## 2025-06-23 PROCEDURE — 25000003 PHARM REV CODE 250: Performed by: EMERGENCY MEDICINE

## 2025-06-23 PROCEDURE — 97535 SELF CARE MNGMENT TRAINING: CPT

## 2025-06-23 PROCEDURE — 97166 OT EVAL MOD COMPLEX 45 MIN: CPT

## 2025-06-23 PROCEDURE — 25000003 PHARM REV CODE 250: Performed by: INTERNAL MEDICINE

## 2025-06-23 PROCEDURE — 25500020 PHARM REV CODE 255: Performed by: INTERNAL MEDICINE

## 2025-06-23 PROCEDURE — 63600175 PHARM REV CODE 636 W HCPCS: Performed by: INTERNAL MEDICINE

## 2025-06-23 PROCEDURE — 97530 THERAPEUTIC ACTIVITIES: CPT

## 2025-06-23 PROCEDURE — 85025 COMPLETE CBC W/AUTO DIFF WBC: CPT | Performed by: INTERNAL MEDICINE

## 2025-06-23 PROCEDURE — 99233 SBSQ HOSP IP/OBS HIGH 50: CPT | Mod: ,,, | Performed by: INTERNAL MEDICINE

## 2025-06-23 PROCEDURE — 21400001 HC TELEMETRY ROOM

## 2025-06-23 PROCEDURE — 27000221 HC OXYGEN, UP TO 24 HOURS

## 2025-06-23 PROCEDURE — 97162 PT EVAL MOD COMPLEX 30 MIN: CPT

## 2025-06-23 PROCEDURE — 80053 COMPREHEN METABOLIC PANEL: CPT | Performed by: INTERNAL MEDICINE

## 2025-06-23 RX ORDER — DEXAMETHASONE SODIUM PHOSPHATE 4 MG/ML
4 INJECTION, SOLUTION INTRA-ARTICULAR; INTRALESIONAL; INTRAMUSCULAR; INTRAVENOUS; SOFT TISSUE EVERY 6 HOURS
Status: DISCONTINUED | OUTPATIENT
Start: 2025-06-23 | End: 2025-06-25

## 2025-06-23 RX ORDER — SODIUM CHLORIDE 9 MG/ML
INJECTION, SOLUTION INTRAVENOUS CONTINUOUS
Status: DISCONTINUED | OUTPATIENT
Start: 2025-06-23 | End: 2025-06-27 | Stop reason: HOSPADM

## 2025-06-23 RX ADMIN — IOHEXOL 200 ML: 350 INJECTION, SOLUTION INTRAVENOUS at 12:06

## 2025-06-23 RX ADMIN — FAMOTIDINE 20 MG: 20 TABLET, FILM COATED ORAL at 09:06

## 2025-06-23 RX ADMIN — POTASSIUM CHLORIDE 10 MEQ: 750 TABLET, FILM COATED, EXTENDED RELEASE ORAL at 09:06

## 2025-06-23 RX ADMIN — SERTRALINE HYDROCHLORIDE 100 MG: 50 TABLET ORAL at 09:06

## 2025-06-23 RX ADMIN — PANTOPRAZOLE SODIUM 40 MG: 40 TABLET, DELAYED RELEASE ORAL at 09:06

## 2025-06-23 RX ADMIN — SUCRALFATE 1 G: 1 TABLET ORAL at 06:06

## 2025-06-23 RX ADMIN — MUPIROCIN: 20 OINTMENT TOPICAL at 09:06

## 2025-06-23 RX ADMIN — POLYETHYLENE GLYCOL 3350 17 G: 17 POWDER, FOR SOLUTION ORAL at 09:06

## 2025-06-23 RX ADMIN — CEFTRIAXONE SODIUM 1 G: 1 INJECTION, POWDER, FOR SOLUTION INTRAMUSCULAR; INTRAVENOUS at 09:06

## 2025-06-23 RX ADMIN — SUCRALFATE 1 G: 1 TABLET ORAL at 09:06

## 2025-06-23 RX ADMIN — DEXAMETHASONE SODIUM PHOSPHATE 4 MG: 4 INJECTION, SOLUTION INTRA-ARTICULAR; INTRALESIONAL; INTRAMUSCULAR; INTRAVENOUS; SOFT TISSUE at 05:06

## 2025-06-23 RX ADMIN — SODIUM CHLORIDE: 9 INJECTION, SOLUTION INTRAVENOUS at 01:06

## 2025-06-23 RX ADMIN — SUCRALFATE 1 G: 1 TABLET ORAL at 05:06

## 2025-06-23 NOTE — PLAN OF CARE
Problem: Occupational Therapy  Goal: Occupational Therapy Goal  Description: LTG: Pt will perform basic ADLs and ADL transfers with Modified independence using LRAD by discharge.    STG: to be met by 7/6    Pt will complete grooming standing at sink with LRAD with SBA.  Pt will complete UB dressing with SBA.  Pt will complete LB dressing with SBA using LRAD.  Pt will complete toileting with SBA using LRAD.  Pt will complete functional mobility to/from toilet and toilet transfer with SBA using LRAD.    Outcome: Progressing

## 2025-06-23 NOTE — PLAN OF CARE
Problem: Physical Therapy  Goal: Physical Therapy Goal  Description: Goals to be met by: 25     Patient will increase functional independence with mobility by performin. Supine to sit with [place]  2. Sit to stand transfer with Modified [place]  3. Bed to chair transfer with Modified [place] using Rolling Walker  4. Gait  x 150 feet with Modified [place] using Rolling Walker.   5. Ascend/descend 5 stair with left Handrails Minimal Assistance using No Assistive Device.     Outcome: Progressing

## 2025-06-23 NOTE — PROGRESS NOTES
[facility] Hospital Medicine Progress Note        Chief Complaint: Inpatient Follow-up for profound weakness, more on the right side    HPI:   Elvi is a 60-year-old female known to me with colorectal cancer with metastasis to the lung in the liver.  Recently underwent an embolization procedure with Interventional Radiology.  The 1st procedure about a month ago and then a more recent 1 about 2 weeks ago.  Since the procedure patient has had increasing weakness to the point where she is unable to ambulate.  Was admitted to the hospital with labs showing mild dehydration and hypokalemia.  Patient received IV fluids with some transient improvement on an outpatient basis however presented to the ER again with progressive weakness and subjective fever.  She was pancultured and started on IV antibiotics.  A Samuels catheter was placed because of urinary retention.      Interval Hx:   This morning at the time of my visit nursing staff noticed patient had significant right-sided weakness.  This was also noted by physical therapy that apart from her generalized weakness patient had profound right-sided upper extremity weakness.  This was notified to nurse which was further notified to me.  Upon my exam patient had equal strength in bilateral lower extremities however decreased though noted to have significant decrease in strength on the right upper extremity.  Patient was unable to  her phone from the table and stated it felt heavy.  Her smile was intact.    We discussed getting a CTA to rule out acute CVA which later confirmed a brain metastasis.      Case was discussed with patient's nurse and  on the floor.    Objective/physical exam:  General: In no acute distress, afebrile  Chest: Clear to auscultation bilaterally  Heart: RRR, +S1, S2, no appreciable murmur  Abdomen: Soft, nontender, BS +  MSK: Warm, no lower extremity edema, no clubbing or cyanosis  Neurologic:  Alert  and oriented, profound weakness in the right upper extremity with strength at 2/5 versus left upper extremity where strength is at 4/5.  Bilateral lower extremities noted to be between 3-4/5    VITAL SIGNS: 24 HRS MIN & MAX LAST   Temp  Min: 97.2 °F (36.2 °C)  Max: 100.6 °F (38.1 °C) 98 °F (36.7 °C)   BP  Min: 90/58  Max: 114/68 96/63   Pulse  Min: 77  Max: 91  80   Resp  Min: 18  Max: 23 18   SpO2  Min: 96 %  Max: 97 % 96 %     I have reviewed the following labs:  Recent Labs   Lab 06/21/25  1422 06/22/25  0558 06/23/25  0450   WBC 31.27  31.27* 25.49* 22.79*   RBC 3.28* 3.23* 3.40*   HGB 8.6* 8.4* 8.9*   HCT 27.0* 27.0* 27.9*   MCV 82.3 83.6 82.1   MCH 26.2* 26.0* 26.2*   MCHC 31.9* 31.1* 31.9*   RDW 17.7* 18.0* 18.4*   * 777* 893*   MPV 8.6 8.5 8.3     Recent Labs   Lab 06/21/25  1422 06/22/25  0558 06/23/25  0450   * 133* 131*   K 3.0* 3.3* 3.4*   CL 94* 96* 94*   CO2 28 30 28   BUN 8.6* 8.1* 7.7*   CREATININE 0.47* 0.49* 0.48*   GLU 90 110 83   CALCIUM 8.4 8.2* 8.2*   MG 1.50*  --   --    ALBUMIN 1.5* 1.3* 1.4*   PROT 6.2 5.5* 6.0   ALKPHOS 677* 567* 599*   ALT 21 17 16   AST 86* 64* 53*   BILITOT 2.3* 2.1* 2.2*     Microbiology Results (last 7 days)       Procedure Component Value Units Date/Time    Urine culture [[id number]]  (Abnormal) Collected: 06/21/25 1344    Order Status: Completed Specimen: Urine Updated: 06/23/25 1018     Urine Culture Less than 10,000 colonies/ml Escherichia coli     Comment: [place] counts of <10,000colonies/ml are of questionable significance. Therefore organisms are identified only.         >/= 100,000 colonies/ml Staphylococcus epidermidis    Blood culture #1 **CANNOT BE ORDERED STAT** [[id number]]  (Normal) Collected: 06/21/25 1553    Order Status: Completed Specimen: Blood Updated: 06/22/25 1702     Blood Culture No Growth At 24 Hours    Blood culture #2 **CANNOT BE ORDERED STAT** [[id number]]  (Normal) Collected: 06/21/25 1600    Order Status: Completed Specimen:  Blood Updated: 06/22/25 1702     Blood Culture No Growth At 24 Hours             See below for Radiology    Assessment/Plan:    Metastatic rectal cancer  -now confirmed with new brain metastasis on CTA around 2.1 cm in the left posterior frontal lobe   -will be initiated on dexamethasone 4 mg q.6 hours IV  -Dr. Hernandez with oncology is aware     Inflammatory response post Y90 embolization on 06/10/2025   -supportive care with IV hydration, electrolyte replacement  -monitor labs and replete as appropriate   -PT/OT to continue treatment as tolerated     Urinary retention   -status post Samuels placement   -urine culture with less than 10,000 colonies of E coli however more than 100,000 colonies of staph epi which is probably contaminant   -currently on Rocephin, will continue for now    Hypokalemia   -replete and monitor, keep potassium above 4     Severe protein calorie Malnutrition and hypoalbuminemia  -recommendations per dietary nutrition     Plan of care has been discussed with nursing staff and with Oncology.  Dr. Hernandez  had already talked to patient's wife and she is aware of the brain metastasis.  Requested to not wake the patient as she was resting.  We will keep patient notified on rounds in a.m.    Patient condition:  fair    Anticipated discharge and Disposition:   Will benefit from rehab      All diagnosis and differential diagnosis have been reviewed; assessment and plan has been documented; I have personally reviewed the labs and test results that are presently available; I have reviewed the patients medication list; I have reviewed the consulting providers response and recommendations. I have reviewed or attempted to review medical records based upon their availability    All of the patient's questions have been  addressed and answered. Patient's is agreeable to the above stated plan. I will continue to monitor closely and make adjustments to medical management as needed.    Portions of this note dictated  using EMR integrated voice recognition software, and may be subject to voice recognition errors not corrected at proofreading. Please contact writer for clarification if needed.   _____________________________________________________________________    Malnutrition Status:  Nutrition consulted. Most recent weight and BMI monitored-     Measurements:  Wt Readings from Last 1 Encounters:   06/21/25 79.4 kg (175 lb)   Body mass index is 27.41 kg/m².    Patient has been screened and assessed by RD.    Malnutrition Type:  Context:    Level:      Malnutrition Characteristic Summary:       Interventions/Recommendations (treatment strategy):        Scheduled Med:   cefTRIAXone (Rocephin) IV (PEDS and ADULTS)  1 g Intravenous Q24H    famotidine  20 mg Oral BID    mupirocin   Nasal BID    pantoprazole  40 mg Oral Daily    polyethylene glycol  17 g Oral Daily    potassium chloride  10 mEq Oral BID    sertraline  100 mg Oral QHS    sucralfate  1 g Oral QID (AC & HS)      Continuous Infusions:   0.9% NaCl   Intravenous Continuous 75 mL/hr at 06/23/25 1338 New Bag at 06/23/25 1338      PRN Meds:    Current Facility-Administered Medications:     acetaminophen, 650 mg, Oral, Q8H PRN    hydrocodone-chlorpheniramine, 5 mL, Oral, Q12H PRN    melatonin, 6 mg, Oral, Nightly PRN    ondansetron, 4 mg, Intravenous, Q8H PRN    sodium chloride 0.9%, 10 mL, Intravenous, PRN    traMADoL, 50 mg, Oral, Q8H PRN     Radiology:  I have personally reviewed the following imaging and agree with the radiologist.     CTA Head and Neck (xpd)  Narrative: EXAMINATION:  CTA HEAD AND NECK (XPD)    CLINICAL HISTORY:  Neuro deficit, acute, stroke suspected;    TECHNIQUE:  Axial images obtained through the cervical region and [place] of [place] before and after the administration of intravenous contrast.    Coronal, sagittal, MIP and 3D reconstructions were obtained from the axial data.    Automatic exposure control was utilized to limit radiation  dose.    Radiation Dose:    Total DLP: 3148 mGy*cm    COMPARISON:  MRI brain dated 07/12/2024, CT chest dated 06/22/2025    FINDINGS:  Head CT with contrast:    There is a 2.1 cm enhancing lesion in the left posterior frontal lobe with surrounding edema.    Local mass effect without midline shift.    If present, stenosis of the carotid bulbs is measured based on NASCET criteria,    i.e. area of maximal stenosis compared to the cervical ICA distal to the bulb.    Cervical CTA:    The origins of the great vessels are patent with mild scattered calcifications.    The common carotid arteries, carotid bulbs and internal carotid are patent.  There are mild calcifications at the left carotid bulb without hemodynamically significant stenosis.    The vertebral arteries are patent.    Intracranial CTA:    The internal carotid arteries, middle cerebral arteries and anterior cerebral arteries are patent    The vertebral arteries, basilar artery and posterior cerebral arteries are patent    The dural venous sinuses are patent.    Additional findings:    See recent CT chest for findings in the thorax.  Impression: 2.1 cm enhancing mass in the left posterior frontal lobe with surrounding edema, concerning for metastatic disease.    Electronically signed by: Zahira Maguire  Date:    06/23/2025  Time:    13:12      Jaimee Maher MD  Department of Hospital Medicine   [facility]   06/23/2025

## 2025-06-23 NOTE — PLAN OF CARE
06/23/25 1044   Discharge Assessment   Assessment Type Discharge Planning Assessment   Confirmed/corrected address, phone number and insurance Yes   Confirmed Demographics Correct on Facesheet   Source of Information patient;family  (Spouse at bs)   Communicated AYALA with patient/caregiver Yes   People in Home spouse   Name(s) of People in Home Zulma   Facility Arrived From: home   Do you expect to return to your current living situation? Yes   Do you have help at home or someone to help you manage your care at home? Yes   Who are your caregiver(s) and their phone number(s)? gaelly , 2 daughters - 1 is RN on 9w   Current cognitive status: Alert/Oriented   Walking or Climbing Stairs Difficulty no   Dressing/Bathing Difficulty no   Home Accessibility   (4 steps into home with railing)   Equipment Currently Used at Home rollator;shower chair   Do you currently have service(s) that help you manage your care at home? No   Do you take prescription medications? Yes   Do you have any problems affording any of your prescribed medications? No   Is the patient taking medications as prescribed? yes   Who is going to help you get home at discharge? fmly   How do you get to doctors appointments? family or friend will provide   Are you on dialysis? No   Discharge Plan A Home   Discharge Plan B Home Health   DME Needed Upon Discharge  none   Discharge Plan discussed with: Patient;Adult children;Spouse/sig other   Transition of Care Barriers None   Housing Stability   In the last 12 months, was there a time when you were not able to pay the mortgage or rent on time? N   At any time in the past 12 months, were you homeless or living in a shelter (including now)? N   Transportation Needs   In the past 12 months, has lack of transportation kept you from medical appointments or from getting medications? no   In the past 12 months, has lack of transportation kept you from meetings, work, or from getting things needed for daily living? No    Food Insecurity   Within the past 12 months, you worried that your food would run out before you got the money to buy more. Never true   Within the past 12 months, the food you bought just didn't last and you didn't have money to get more. Never true   Alcohol Use   Q2: How many drinks containing alcohol do you have on a typical day when you are drinking? None   Utilities   In the past 12 months has the electric, gas, oil, or water company threatened to shut off services in your home? No   Health Literacy   How often do you need to have someone help you when you read instructions, pamphlets, or other written material from your doctor or pharmacy? Sometimes     Pt lives with spouse Zulma. She does not have HH services. She has rollator, shower chair. Other needs Tbd,

## 2025-06-23 NOTE — CONSULTS
Hematology/Oncology  Consult Note    Patient Name: Alondra Snyder  MRN: [id number]  Admission Date: 6/21/2025  Hospital Length of Stay: 2 days  Attending Provider: Jaimee Maher MD  Consulting Provider: Ricardo Hernandez MD  Principal Problem:  Profound weakness    Inpatient consult to Oncology  Consult performed by: Ricardo Hernandez MD  Consult ordered by: Ricardo Schultz MD        Subjective:     HPI:  60-year-old WF followed at [facility] with metastatic rectal cancer.    Treatment History  Resection 3/15 (T2 N1b M0), 3/28+ LN's  Oxaliplatin/Xeloda x6 completed 8/15  --> Xeloda/XRT completed 11/11/15  FOLFIRI + Bevacizumab x 10 (7/24-12/24)  FOLFOX + Bevacizumab x 4 (2/25- 5/25)    Molecular testing:  HER2 negative.  Pathology QNS for additional molecular studies.     [facility] peripheral blood 8/7/24: +KRAS G12D, FRANCESCO.  Positive mutations of APC, PTEN, TP53 and FBXW7     She was started on palliative chemotherapy with a regimen of FOLFIRI and Bevacizumab 7/31/24.  CT C/A/P 10/21/24: DIANA mass 5.9 x 5.4 cm (previous 6.9 x 7.3 cm), left hilar LN 1.5 x 1.1 cm (previous 1.8 x 1.5 cm).  Multiple hypodense liver metastases, largest lesion left hepatic lobe 4.9 cm.            CT C/A/P 12/19/24:  Stable DIANA mass 5.8 x 5.2 cm.  Bilobar hepatic metastases, several showing slight interval enlargement.  Right adrenal nodule 1.2 cm (previous 1 cm).     CT-guided liver biopsy 1/13/25:  Metastatic adenocarcinoma consistent with colorectal primary (CK20 and CDX2+)     [facility] 2/13/25:  Minimal disease progression on imaging 10/24 to 12/24 with slight enlargement of multiple liver lesions and stable appearance of the lung and adrenal metastases.  Recommend transitioning to FOLFOX + Bevacizumab.     CT C/A/P 4/11/25:  DIANA mass decreased 5.0 x 4.8 cm (previous 5.8 x 5.2 cm).  Progressed hepatic metastatic disease, examples anterior left hepatic lobe 4.2 x 4.0 cm (previous 2.7 x 2.3 cm), lateral  right hepatic lobe 4.7 x 4.2 cm (previous 3.1 x 2.5 cm), multiple new hepatic metastases.  Right adrenal metastases 2.9 x 1.4 cm (previous 1.6 x 1.1 cm).    Y-90 Chemoembolization lateral left hepatic artery 6/10/25.    She reported improvement in her abdominal pain following the procedure.  8-10 days following her embolization, she developed progressive and profound weakness with difficulty ambulating.  Laboratory testing showed mild dehydration and hypokalemia.  She received IV hydration as an outpatient at [facility] on 6/19/25.  She had transient improvement in her symptoms.  She presented to the ER 6/21/25 with progressive weakness and subjective fever.  She was admitted and given IV hydration, pancultured and started on IV antibiotics.  Bladder scan revealed urinary retention and Samuels catheter was placed.         Medications:  Continuous Infusions:    Scheduled Meds:   cefTRIAXone (Rocephin) IV (PEDS and ADULTS)  1 g Intravenous Q24H    famotidine  20 mg Oral BID    mupirocin   Nasal BID    pantoprazole  40 mg Oral Daily    polyethylene glycol  17 g Oral Daily    potassium chloride  10 mEq Oral BID    sertraline  100 mg Oral QHS    sucralfate  1 g Oral QID (AC & HS)     PRN Meds:  Current Facility-Administered Medications:     acetaminophen, 650 mg, Oral, Q8H PRN    hydrocodone-chlorpheniramine, 5 mL, Oral, Q12H PRN    melatonin, 6 mg, Oral, Nightly PRN    ondansetron, 4 mg, Intravenous, Q8H PRN    sodium chloride 0.9%, 10 mL, Intravenous, PRN    traMADoL, 50 mg, Oral, Q8H PRN     Review of patient's allergies indicates:  No Known Allergies       PMHx:  Endometriosis, anxiety/depression  PSHx:  Tonsils, sinus surgery, right oophorectomy, partial colectomy  SH:  Lifetime nonsmoker, occasional alcohol use.  Lives in [place] with her significant other.  Works as a .  FH:  Mother had kidney cancer.  A maternal aunt and 1st cousin had breast cancer.    Review of Systems   Constitutional:   Positive for activity change, appetite change, fatigue and fever.   HENT:  Negative for mouth sores, sore throat and trouble swallowing.    Eyes: Negative.    Respiratory:  Negative for cough and shortness of breath.    Cardiovascular:  Negative for chest pain and leg swelling.   Gastrointestinal:  Negative for abdominal pain, constipation, diarrhea, nausea and vomiting.   Genitourinary:  Positive for difficulty urinating. Negative for dysuria, flank pain and frequency.   Musculoskeletal:  Positive for arthralgias. Negative for back pain.   Skin:  Negative for pallor and rash.   Neurological:  Positive for weakness. Negative for dizziness, numbness and headaches.   Hematological:  Negative for adenopathy. Does not bruise/bleed easily.   Psychiatric/Behavioral: Negative.         Objective:     Vital Signs (Most Recent):  Temp: 99.9 °F (37.7 °C) (06/23/25 0745)  Pulse: 85 (06/23/25 0745)  Resp: (!) 22 (06/23/25 0406)  BP: (!) 90/58 (06/23/25 0745)  SpO2: 97 % (06/23/25 0745) Vital Signs (24h Range):  Temp:  [97.2 °F (36.2 °C)-100.6 °F (38.1 °C)] 99.9 °F (37.7 °C)  Pulse:  [72-91] 85  Resp:  [20-23] 22  SpO2:  [96 %-98 %] 97 %  BP: (90-99)/(53-64) 90/58     Weight: 79.4 kg (175 lb)  Body surface area is 1.94 meters squared.    Physical Exam  Constitutional:       Comments: Well-developed, weak appearing WF in NAD   HENT:      Head: Normocephalic.      Mouth/Throat:      Mouth: Mucous membranes are moist.      Pharynx: Oropharynx is clear. No posterior oropharyngeal erythema.   Eyes:      General: No scleral icterus.     Extraocular Movements: Extraocular movements intact.      Pupils: Pupils are equal, round, and reactive to light.   Cardiovascular:      Rate and Rhythm: Normal rate and regular rhythm.      Heart sounds: No murmur heard.     Comments: MediPort catheter right chest wall  Pulmonary:      Breath sounds: Normal breath sounds.   Abdominal:      General: There is no distension.      Palpations: Abdomen is  soft.      Tenderness: There is no abdominal tenderness.      Comments: Bowel sounds decreased.  Well-healed midline incision below umbilicus and laparoscopic sites.  No palpable masses or organomegaly.   Musculoskeletal:         General: Swelling (trace lower extremity edema) present. No tenderness. Normal range of motion.      Cervical back: Neck supple. No tenderness.   Skin:     General: Skin is warm and dry.      Findings: No rash.   Neurological:      General: No focal deficit present.      Mental Status: She is alert and oriented to person, place, and time.      Cranial Nerves: No cranial nerve deficit.      Motor: Weakness (generalized, nonfocal) present.         Significant Labs:   CBC:   Recent Labs   Lab 06/21/25  1422 06/22/25  0558 06/23/25  0450   WBC 31.27  31.27* 25.49* 22.79*   HGB 8.6* 8.4* 8.9*   HCT 27.0* 27.0* 27.9*   * 777* 893*    and CMP:   Recent Labs   Lab 06/21/25  1422 06/22/25  0558 06/23/25  0450   * 133* 131*   K 3.0* 3.3* 3.4*   CL 94* 96* 94*   CO2 28 30 28   GLU 90 110 83   BUN 8.6* 8.1* 7.7*   CREATININE 0.47* 0.49* 0.48*   CALCIUM 8.4 8.2* 8.2*   PROT 6.2 5.5* 6.0   ALBUMIN 1.5* 1.3* 1.4*   BILITOT 2.3* 2.1* 2.2*   ALKPHOS 677* 567* 599*   AST 86* 64* 53*   ALT 21 17 16       Diagnostic Results:  All CT images reviewed by me personally.    Impression/Recommendations:   IMPRESSION  Metastatic rectal cancer  Inflammatory response post Y-90 embolization 6/10/25  Leukocytosis and thrombocytosis secondary to above  Acute urinary retention  Hypokalemia  Malnutrition and hypoalbuminemia    RECOMMENDATIONS  Clinical, imaging and laboratory findings consistent with an acute inflammatory response following Y-90 embolization.  Treatment is supportive with IV hydration, electrolyte replacement and laboratory monitoring.  Consult PT and OT for mobilization and strengthening.  Case discussed and reviewed with Interventional Radiology.  Treatment plan reviewed and discussed with  the patient and her wife.  All questions answered.      TIM HARDY MD  Hematology/Oncology   [facility]    ADDENDUM 6/23/25 3:25PM  Physical therapy noted during their assessment that patient was noticeably more weak on the right side.  CTA head and neck was ordered demonstrating a 2.1 cm enhancing lesion in the left posterior frontal lobe with significant vasogenic edema.  There was no significant vascular stenosis.  Patient was sleeping when I received this information and her wife asked that she not be disturbed.  She will be started on IV Decadron to decrease her vasogenic edema.  We will discuss additional treatment tomorrow with Radiation Oncology.  Treatment plan also discussed with Dr. Maher.

## 2025-06-23 NOTE — PT/OT/SLP EVAL
Physical Therapy Evaluation    Patient Name:  Alondra Snyder   MRN:  [id number]    Recommendations:     Discharge therapy intensity: High Intensity Therapy   Discharge Equipment Recommendations: wheelchair, walker, rolling, bedside commode   Barriers to discharge: Impaired mobility and Ongoing medical needs    Assessment:     Alondra Snyder is a 60 y.o. female admitted with a medical diagnosis of severe fatigue/weakness, hx of metastatic colon CA with lung/liver mets, acute urinary retention, recent Y-90 embolization with acute inflammatory response.  She presents with the following impairments/functional limitations: weakness, impaired endurance, impaired self care skills, impaired functional mobility, gait instability, impaired balance, decreased upper extremity function, decreased lower extremity function .     Pt presents with generalized weakness,more profound on R side. Required min-modA for functional mobility with limited walking ability due to R-sided weakness and postural control deficits. Does not appear to have any other neurologic deficits. Prior the last week, pt was independent and ambulatory without DME. Will have some family support from wife and daughter, however daughter does work. May benefit from short term stay at high intensity facility to facilitate return to independence, unless family able to meet pt's needs in current state. Notified Amrit GRAYSON of concerns regarding unilateral weakness.    Rehab Prognosis: Good; patient would benefit from acute skilled PT services to address these deficits and reach maximum level of function.    Recent Surgery: * No surgery found *      Plan:     During this hospitalization, patient would benefit from acute PT services 6 x/week to address the identified rehab impairments via gait training, therapeutic activities, therapeutic exercises, neuromuscular re-education and progress toward the following goals:    Plan of Care Expires:  07/23/25    Subjective      Chief Complaint: weakness  Patient/Family Comments/goals: PLOF  Pain/Comfort:  Pain Rating 1: 0/10    Patients cultural, spiritual, Temple conflicts given the current situation: no    Living Environment:  Pt lives with her wife and daughter in a SLH with 4-5 ALCIRA with LHR. Tub with a shower chair.  Prior to admission, patients level of function was independent.  Equipment used at home: rollator, shower chair.  DME owned (not currently used): none.  Upon discharge, patient will have assistance from spouse and daughter.    Objective:     Communicated with RN prior to session.  Patient found up in chair with pulse ox (continuous), oxygen  upon PT entry to room.    General Precautions: Standard, fall  Orthopedic Precautions:    Braces:    Respiratory Status: Nasal cannula, flow 2 L/min; ambulated on room air pt's SpO2 94-95%  Blood Pressure: NT      Exams:  Neuro exam: smooth pursuits intact all planes, smile symmetrical, tongue protrusion symmetrical   Fine Motor Coordination:    -       Impaired  Right hand thumb/finger opposition skills impaired; LUE intact  RLE Strength: 3-/5 hip flexion, 3-/5 knee extension, 4-/5 ankle DF, 4/5 ankle PF  LLE Strength: 4/5 hip flexion, 4+/5 knee extension, 5/5 ankle DF, 5/5 ankle PF  Skin integrity: Visible skin intact      Functional Mobility:  Bed Mobility:     Sit to Supine: moderate assistance  Transfers:     Sit to Stand:  minimum assistance and moderate assistance with rolling walker  Bed to Chair: minimum assistance with  rolling walker  using  Stand Pivot  Gait: 6ft with RW with min-modA, R lateral lean, impaired hip flexion/foot clearance in R swing, mild R knee buckling in stance. Chair close in tow.  Balance: static standing balance = Cassy with RW 2/2 R lateral lean      AM-PAC 6 CLICK MOBILITY  Total Score:12     Co-Treatment: No    Treatment & Education:  - Standing marches with RW, Cassy, decreased hip flexion against gravity on R.   - Pt reports has been sitting  up in chair x 3 hrs this AM and requesting back to bed to rest, stand pivot t/f chair>bed with Cassy  - Pt's transfer ability improved with cues for anterior weight shift at t/f initiation    Patient, spouse were, and daughter/s were provided with verbal education education regarding PT role/goals/POC, fall prevention, safety awareness, and discharge/DME recommendations.  Understanding was verbalized.     Patient left HOB elevated with all lines intact, call button in reach, RN notified, and family present.    GOALS:   Multidisciplinary Problems       Physical Therapy Goals          Problem: Physical Therapy    Goal Priority Disciplines Outcome Interventions   Physical Therapy Goal     PT, PT/OT Progressing    Description: Goals to be met by: 25     Patient will increase functional independence with mobility by performin. Supine to sit with [place]  2. Sit to stand transfer with Modified [place]  3. Bed to chair transfer with Modified [place] using Rolling Walker  4. Gait  x 150 feet with Modified [place] using Rolling Walker.   5. Ascend/descend 5 stair with left Handrails Minimal Assistance using No Assistive Device.                          History:     Past Medical History:   Diagnosis Date    Anxiety disorder, unspecified     Carpal tunnel syndrome     Malignant neoplasm of rectum     Rectal carcinoma        Past Surgical History:   Procedure Laterality Date    ADENOIDECTOMY      BREAST SURGERY      benign cyst removed    COLON RESECTION      COLON SURGERY  2015    COLONOSCOPY      HYSTERECTOMY      only had one ovary removed, due to endometriosis    removal of right ovary Right     SINUS SURGERY      TONSILLECTOMY         Time Tracking:     PT Received On: 25  PT Start Time: 941     PT Stop Time: 1020  PT Total Time (min): 39 min     Billable Minutes: Evaluation MOD and Therapeutic Activity 10      2025

## 2025-06-23 NOTE — PT/OT/SLP EVAL
Occupational Therapy  Evaluation    Name: Alondra Snyder  MRN: [id number]  Recent Surgery: * No surgery found *      Recommendations:     Discharge therapy intensity: High Intensity Therapy   Discharge Equipment Recommendations:  walker, rolling, bedside commode, wheelchair  Barriers to discharge:  Inaccessible home environment    Assessment:     Alondra Snyder is a 60 y.o. female with a medical diagnosis of profound weakness, PMH of rectal carcinoma, carpal tubbel syndrome, anxiety, recently received Y90 embolization to implant radioactive seeds in liver on Patricia 10,2025 and has been unable to fully ambulate following due to generalized weakness per chart review.  She presents with the following performance deficits affecting function: weakness, impaired endurance, impaired self care skills, impaired functional mobility, impaired balance, decreased coordination, decreased upper extremity function, decreased lower extremity function, decreased safety awareness, impaired coordination, impaired fine motor, impaired cardiopulmonary response to activity.     Patient with fair tolerance for today's session. Patient presents with R-sided hemiparesis, impaired R UE coordination, decreased standing balance/tolerance, impaired cardiopulmonary response to activity, decreased endurance, unfamiliarity with adaptive techniques for BADLs, and requires min- mod A for functional transfers/mobility impeding independence in meaningful activities of daily living. Patient to benefit from continued skilled OT intervention to address aforementioned deficits to reduce risk of falls and improve occupational performance prior to next level of care.      Rehab Prognosis: Good; patient would benefit from acute skilled OT services to address these deficits and reach maximum level of function.       Plan:     Patient to be seen 6 x/week to address the above listed problems via self-care/home management, therapeutic activities, therapeutic  "exercises, neuromuscular re-education, wheelchair management/training  Plan of Care Expires: 07/23/25  Plan of Care Reviewed with: patient, spouse    Subjective     Chief Complaint: "sharp" intermittent pain ad RLQ, shortness of breath with engagement in activity, and R-sided "heaviness."  Patient/Family Comments/goals: Patient reports personal goal of wanting to "get stronger," to return to PLOF    Occupational Profile:  Living Environment: Patient resides with wife and daughter in single level home with 5-6 steps to enter with unilateral handrail located a L lateral upon ascending home   Previous level of function: Independent prior to procedure 6/10/2025  Equipment Used at Home: rollator, shower chair  Assistance upon Discharge: Wife and daughter to assist as needed    Pain/Comfort:  Pain Rating 1: 0/10    Patients cultural, spiritual, Restoration conflicts given the current situation: no    Objective:     OT communicated with RnAmrit, prior to session.      Patient was found supine with oxygen, alarcon catheter, peripheral IV, pulse ox (continuous), telemetry upon OT entry to room.    General Precautions: Standard, fall  Orthopedic Precautions: N/A  Braces: N/A    Vital Signs: Sp02: 93% at rest supine in bed on RA  Supplemental 02: Patient with SPO2 desaturation to 87-88% following bed mobility; improved to 93-94% with NC at 2L following ~1-2 minutes static sitting EOB  With Activity: Patient with SPO2 desaturation to 86-87% on NC 2L following functional sit <> stand transfer and lateral steps; improved to 92-93% following return to sitting. RN notified.       Functional Mobility/Transfers:  Bed mobility:    Bridging: minimum assistance  Supine to Sit: minimum assistance  Sit to Supine: moderate assistance  Transfers: Sit to Stand: minimum assistance with rolling walker  Functional Mobility: Patient encouraged to attempt L lateral steps towards HOB w/ RW req mod A for RW positioning, R LE anterior knee block " secondary to buckling, and R <> L LE weightshifting. Patient reporting fatigue requiring rest break following; see above section for SPO2 desaturations.     AMPAC 6 Click ADL:  AMPAC Total Score: 15    Functional Cognition:  Orientation: oriented to Person, Place, Time, and Situation  Safety Awareness: WFL  Insight into Deficits: WFL    Visual Perceptual Skills:  Intact    Upper Extremity Function:  Right Upper Extremity:   Shoulder flexion 0- ~130* in scaption with noted lateral trunk flexion; patient with noted intermittent pronator drift.   WFL elbow flexion/extension, wrist flexion/extension, gross grasp, full digit extension  Impaired full digit opposition with limited first digit abduction/adduction and unsure of patient's proprioception throughout distal extremity    4/5 shoulder flexion, elbow flexion/extension   3+/5 wrist flexion/extension   3+/5 gross grasp     WFL modified baldo scale proximally for muscle tone     Impaired R UE finger-to-nose coordination assessment with limited forearm rotation, nose/finger approximation, and decreased speed.       Left Upper Extremity:  WFL    Balance:   Static sitting balance: WFL  Dynamic sitting balance:Patient req mod A for dynamic short sitting EOB (modified figure-4 positioning) with noted R lateral leaning   Static standing balance:Impaired. Patient req min A for static standing w/ RW for R LE anterior knee block, neutral pelvis/trunk positioning, and safety/balance   Dynamic standing balance:Impaired. Patient req mod A for dynamic standing EOB for neutral pelvis/trunk positioning, R LE anterior knee block, and safety/balance. Patient with noted R lateral leaning, R knee buckling,     Therapeutic Positioning  Risk for acquired pressure injuries is increased due to relative decrease in mobility d/t hospitalization .    OT interventions performed during the course of today's session:   Education was provided on benefits of and recommendations for therapeutic  positioning    Skin assessment: all bony prominences were assessed    Findings: no redness or breakdown noted    OT recommendations for therapeutic positioning throughout hospitalization:   Follow [facility] Pressure Injury Prevention Protocol    Additional Treatment:  ADL Training: Lower Body Dressing: maximal assistance Patient encouraged to attempt donning B socks seated EOB req max A for initiating threading to forefoot level in supported modified figure-4 positioning ; patient demo ability to adjust over B heel level with L UE. Patient with notably limited R UE coordination.    Patient Education:  Patient, spouse were, and daughter/s were provided with verbal education education regarding OT role/goals/POC, fall prevention, safety awareness, and pressure ulcer prevention.  Understanding was verbalized.     Patient left supine with all lines intact, call button in reach, RN notified, and Spouse and daughter present.    GOALS:   Multidisciplinary Problems       Occupational Therapy Goals          Problem: Occupational Therapy    Goal Priority Disciplines Outcome Interventions   Occupational Therapy Goal     OT, PT/OT Progressing    Description: LTG: Pt will perform basic ADLs and ADL transfers with Modified independence using LRAD by discharge.    STG: to be met by 7/6    Pt will complete grooming standing at sink with LRAD with SBA.  Pt will complete UB dressing with SBA.  Pt will complete LB dressing with SBA using LRAD.  Pt will complete toileting with SBA using LRAD.  Pt will complete functional mobility to/from toilet and toilet transfer with SBA using LRAD.                         History:     Past Medical History:   Diagnosis Date    Anxiety disorder, unspecified     Carpal tunnel syndrome     Malignant neoplasm of rectum     Rectal carcinoma          Past Surgical History:   Procedure Laterality Date    ADENOIDECTOMY  1968    BREAST SURGERY  2010    benign cyst removed    COLON RESECTION      COLON SURGERY   2015    COLONOSCOPY      HYSTERECTOMY  1993    only had one ovary removed, due to endometriosis    removal of right ovary Right     SINUS SURGERY      TONSILLECTOMY  1968       Time Tracking:     OT Date of Treatment: 06/23/25  OT Start Time: 1440  OT Stop Time: 1518  OT Total Time (min): 38 min    Billable Minutes:Evaluation 28  Self Care/Home Management 10    6/23/2025

## 2025-06-24 ENCOUNTER — APPOINTMENT (OUTPATIENT)
Dept: RADIATION THERAPY | Facility: HOSPITAL | Age: 60
End: 2025-06-24
Attending: RADIOLOGY
Payer: COMMERCIAL

## 2025-06-24 LAB
ALBUMIN SERPL-MCNC: 1.3 G/DL (ref 3.4–4.8)
ALBUMIN/GLOB SERPL: 0.3 RATIO (ref 1.1–2)
ALP SERPL-CCNC: 645 UNIT/L (ref 40–150)
ALT SERPL-CCNC: 16 UNIT/L (ref 0–55)
ANION GAP SERPL CALC-SCNC: 9 MEQ/L
AST SERPL-CCNC: 53 UNIT/L (ref 11–45)
BACTERIA UR CULT: ABNORMAL
BACTERIA UR CULT: ABNORMAL
BASOPHILS # BLD AUTO: 0.08 X10(3)/MCL
BASOPHILS NFR BLD AUTO: 0.4 %
BILIRUB SERPL-MCNC: 1.6 MG/DL
BUN SERPL-MCNC: 9.3 MG/DL (ref 9.8–20.1)
CALCIUM SERPL-MCNC: 8.4 MG/DL (ref 8.4–10.2)
CHLORIDE SERPL-SCNC: 100 MMOL/L (ref 98–107)
CO2 SERPL-SCNC: 27 MMOL/L (ref 23–31)
CREAT SERPL-MCNC: 0.49 MG/DL (ref 0.55–1.02)
CREAT/UREA NIT SERPL: 19
EOSINOPHIL # BLD AUTO: 0.03 X10(3)/MCL (ref 0–0.9)
EOSINOPHIL NFR BLD AUTO: 0.2 %
ERYTHROCYTE [DISTWIDTH] IN BLOOD BY AUTOMATED COUNT: 18.7 % (ref 11.5–17)
GFR SERPLBLD CREATININE-BSD FMLA CKD-EPI: >60 ML/MIN/1.73/M2
GLOBULIN SER-MCNC: 4.6 GM/DL (ref 2.4–3.5)
GLUCOSE SERPL-MCNC: 138 MG/DL (ref 82–115)
HCT VFR BLD AUTO: 28 % (ref 37–47)
HGB BLD-MCNC: 8.9 G/DL (ref 12–16)
IMM GRANULOCYTES # BLD AUTO: 0.37 X10(3)/MCL (ref 0–0.04)
IMM GRANULOCYTES NFR BLD AUTO: 2.1 %
LYMPHOCYTES # BLD AUTO: 0.61 X10(3)/MCL (ref 0.6–4.6)
LYMPHOCYTES NFR BLD AUTO: 3.4 %
MAGNESIUM SERPL-MCNC: 1.7 MG/DL (ref 1.6–2.6)
MCH RBC QN AUTO: 26 PG (ref 27–31)
MCHC RBC AUTO-ENTMCNC: 31.8 G/DL (ref 33–36)
MCV RBC AUTO: 81.9 FL (ref 80–94)
MONOCYTES # BLD AUTO: 0.73 X10(3)/MCL (ref 0.1–1.3)
MONOCYTES NFR BLD AUTO: 4 %
NEUTROPHILS # BLD AUTO: 16.22 X10(3)/MCL (ref 2.1–9.2)
NEUTROPHILS NFR BLD AUTO: 89.9 %
NRBC BLD AUTO-RTO: 0 %
PLATELET # BLD AUTO: 886 X10(3)/MCL (ref 130–400)
PMV BLD AUTO: 8.5 FL (ref 7.4–10.4)
POTASSIUM SERPL-SCNC: 3.7 MMOL/L (ref 3.5–5.1)
PROT SERPL-MCNC: 5.9 GM/DL (ref 5.8–7.6)
RBC # BLD AUTO: 3.42 X10(6)/MCL (ref 4.2–5.4)
SODIUM SERPL-SCNC: 136 MMOL/L (ref 136–145)
WBC # BLD AUTO: 18.04 X10(3)/MCL (ref 4.5–11.5)

## 2025-06-24 PROCEDURE — 25500020 PHARM REV CODE 255: Performed by: INTERNAL MEDICINE

## 2025-06-24 PROCEDURE — 25000003 PHARM REV CODE 250: Performed by: INTERNAL MEDICINE

## 2025-06-24 PROCEDURE — 97110 THERAPEUTIC EXERCISES: CPT

## 2025-06-24 PROCEDURE — 27000221 HC OXYGEN, UP TO 24 HOURS

## 2025-06-24 PROCEDURE — 63600175 PHARM REV CODE 636 W HCPCS: Performed by: INTERNAL MEDICINE

## 2025-06-24 PROCEDURE — 77334 RADIATION TREATMENT AID(S): CPT | Performed by: RADIOLOGY

## 2025-06-24 PROCEDURE — 97530 THERAPEUTIC ACTIVITIES: CPT

## 2025-06-24 PROCEDURE — 99233 SBSQ HOSP IP/OBS HIGH 50: CPT | Mod: ,,, | Performed by: INTERNAL MEDICINE

## 2025-06-24 PROCEDURE — 83735 ASSAY OF MAGNESIUM: CPT | Performed by: INTERNAL MEDICINE

## 2025-06-24 PROCEDURE — 85025 COMPLETE CBC W/AUTO DIFF WBC: CPT | Performed by: INTERNAL MEDICINE

## 2025-06-24 PROCEDURE — 97116 GAIT TRAINING THERAPY: CPT

## 2025-06-24 PROCEDURE — 21400001 HC TELEMETRY ROOM

## 2025-06-24 PROCEDURE — 36415 COLL VENOUS BLD VENIPUNCTURE: CPT | Performed by: INTERNAL MEDICINE

## 2025-06-24 PROCEDURE — A9577 INJ MULTIHANCE: HCPCS | Performed by: INTERNAL MEDICINE

## 2025-06-24 PROCEDURE — 97535 SELF CARE MNGMENT TRAINING: CPT

## 2025-06-24 PROCEDURE — 80053 COMPREHEN METABOLIC PANEL: CPT | Performed by: INTERNAL MEDICINE

## 2025-06-24 PROCEDURE — 25000003 PHARM REV CODE 250: Performed by: EMERGENCY MEDICINE

## 2025-06-24 RX ADMIN — SUCRALFATE 1 G: 1 TABLET ORAL at 07:06

## 2025-06-24 RX ADMIN — POTASSIUM CHLORIDE 10 MEQ: 750 TABLET, FILM COATED, EXTENDED RELEASE ORAL at 07:06

## 2025-06-24 RX ADMIN — FAMOTIDINE 20 MG: 20 TABLET, FILM COATED ORAL at 09:06

## 2025-06-24 RX ADMIN — DEXAMETHASONE SODIUM PHOSPHATE 4 MG: 4 INJECTION, SOLUTION INTRA-ARTICULAR; INTRALESIONAL; INTRAMUSCULAR; INTRAVENOUS; SOFT TISSUE at 06:06

## 2025-06-24 RX ADMIN — SUCRALFATE 1 G: 1 TABLET ORAL at 09:06

## 2025-06-24 RX ADMIN — DEXAMETHASONE SODIUM PHOSPHATE 4 MG: 4 INJECTION, SOLUTION INTRA-ARTICULAR; INTRALESIONAL; INTRAMUSCULAR; INTRAVENOUS; SOFT TISSUE at 04:06

## 2025-06-24 RX ADMIN — MUPIROCIN: 20 OINTMENT TOPICAL at 09:06

## 2025-06-24 RX ADMIN — DEXAMETHASONE SODIUM PHOSPHATE 4 MG: 4 INJECTION, SOLUTION INTRA-ARTICULAR; INTRALESIONAL; INTRAMUSCULAR; INTRAVENOUS; SOFT TISSUE at 11:06

## 2025-06-24 RX ADMIN — GADOBENATE DIMEGLUMINE 15 ML: 529 INJECTION, SOLUTION INTRAVENOUS at 06:06

## 2025-06-24 RX ADMIN — SODIUM CHLORIDE: 9 INJECTION, SOLUTION INTRAVENOUS at 07:06

## 2025-06-24 RX ADMIN — SUCRALFATE 1 G: 1 TABLET ORAL at 06:06

## 2025-06-24 RX ADMIN — PANTOPRAZOLE SODIUM 40 MG: 40 TABLET, DELAYED RELEASE ORAL at 09:06

## 2025-06-24 RX ADMIN — POTASSIUM CHLORIDE 10 MEQ: 750 TABLET, FILM COATED, EXTENDED RELEASE ORAL at 09:06

## 2025-06-24 RX ADMIN — FAMOTIDINE 20 MG: 20 TABLET, FILM COATED ORAL at 07:06

## 2025-06-24 RX ADMIN — MUPIROCIN: 20 OINTMENT TOPICAL at 08:06

## 2025-06-24 RX ADMIN — SERTRALINE HYDROCHLORIDE 100 MG: 50 TABLET ORAL at 07:06

## 2025-06-24 RX ADMIN — DEXAMETHASONE SODIUM PHOSPHATE 4 MG: 4 INJECTION, SOLUTION INTRA-ARTICULAR; INTRALESIONAL; INTRAMUSCULAR; INTRAVENOUS; SOFT TISSUE at 12:06

## 2025-06-24 RX ADMIN — SUCRALFATE 1 G: 1 TABLET ORAL at 04:06

## 2025-06-24 NOTE — PLAN OF CARE
Acute rehab recommended, foc list provided. We discussed inpt rehab vs home health with pt/ot. Pt/fmly will discuss.

## 2025-06-24 NOTE — CONSULTS
[facility] - [facility] [address] [facility] Tele[facility]  Radiation Oncology  Consult Note    Patient Name: Alondra Snyder  MRN: [id number]  Admission Date: 6/21/2025  Hospital Length of Stay: 3 days  Code Status: Full Code   Attending Provider: Jaimee Maher MD  Consulting Provider: LIZZETH Gregorio  Primary Care Physician: Jaimee Maehr MD  Principal Problem:<principal problem not specified>    Consults  Subjective:     HPI: 60 year old female with known history of metastatic rectal cancer. She has been following with medical oncology for quite some time. Of note she was also treated with chemoradiation back in 2015 but has note been seen by our practice in quite some time. Most recently she underwent Y-90 chemoembolization of lateral left hepatic artery on 6/10/25. She reported initial improvement in abdominal pain following procedure, however approximately 1-2 weeks later she began having progressive weakness and difficulty ambulating. She was noted to have some electrolyte imbalances and was treated outpatient with IV hydration. However this did not resolve symptoms so she then presented to ED on 6/21/25 with same complaints. She was admitted for further evaluation, cultured and started on IV antibiotics. During inpatient PT evaluation on 6/23/25 she was found to have some new onset, notable right sided weakness and subsequent CTA was ordered which revealed 2.1cm enhancing lesion to left posterior frontal lobe with vasogenic edema present. Patient was started on IV decadron and Rad/Onc consult was placed.     Seen on rounds today. She is sitting up chair beside bed. She is awake, alert and oriented x3 with no speech or cognition impairment noted. She reports improvement in her right sided weakness since started in IV decadron. We discussed the role of radiation in treatment of her new brain lesion as well as tentative treatment recommendations, expectations and possible side effects. She  voiced understanding and agreement with tentative plan. Clinically she has no new neurological complaints. No worsening pain or other issues to note.     Oncology Treatment Plan:   OP GI [id number] (oxaliplatin leucovorin fluorouracil) with bevacizumab Q2W    Current Facility-Administered Medications   Medication    0.9% NaCl infusion    acetaminophen tablet 650 mg    cefTRIAXone injection 1 g    dexAMETHasone injection 4 mg    famotidine tablet 20 mg    hydrocodone-chlorpheniramine 10-8 mg/5 mL suspension 5 mL    melatonin tablet 6 mg    mupirocin 2 % ointment    ondansetron injection 4 mg    pantoprazole EC tablet 40 mg    polyethylene glycol packet 17 g    potassium chloride CR tablet 10 mEq    sertraline tablet 100 mg    sodium chloride 0.9% flush 10 mL    sucralfate tablet 1 g    traMADoL tablet 50 mg       Review of patient's allergies indicates:  No Known Allergies     Past Medical History:   Diagnosis Date    Anxiety disorder, unspecified     Carpal tunnel syndrome     Malignant neoplasm of rectum     Rectal carcinoma      Past Surgical History:   Procedure Laterality Date    ADENOIDECTOMY  1968    BREAST SURGERY  2010    benign cyst removed    COLON RESECTION      COLON SURGERY  2015    COLONOSCOPY      HYSTERECTOMY  1993    only had one ovary removed, due to endometriosis    removal of right ovary Right     SINUS SURGERY      TONSILLECTOMY  1968     Family History       Problem Relation (Age of Onset)    Cancer Mother    Cirrhosis Father    Diabetes Brother, Brother    Early death Father          Tobacco Use    Smoking status: Never    Smokeless tobacco: Never   Substance and Sexual Activity    Alcohol use: Not Currently     Alcohol/week: 2.0 standard drinks of alcohol    Drug use: Never    Sexual activity: Not Currently     Partners: Female       Review of Systems   Constitutional:  Positive for activity change, fatigue and fever.   HENT: Negative.     Eyes: Negative.    Respiratory: Negative.      Cardiovascular: Negative.    Genitourinary:  Positive for difficulty urinating and dysuria.   Musculoskeletal:         Right sided weakness- improving    Neurological:  Positive for weakness.   Psychiatric/Behavioral: Negative.       Objective:     Vital Signs (Most Recent):  Temp: 97.4 °F (36.3 °C) (06/24/25 0748)  Pulse: 71 (06/24/25 0748)  Resp: 16 (06/23/25 1924)  BP: 99/70 (06/24/25 0748)  SpO2: 97 % (06/24/25 0748) Vital Signs (24h Range):  Temp:  [96.3 °F (35.7 °C)-98.2 °F (36.8 °C)] 97.4 °F (36.3 °C)  Pulse:  [70-84] 71  Resp:  [16-18] 16  SpO2:  [91 %-97 %] 97 %  BP: ()/(63-73) 99/70     Weight: 79.4 kg (175 lb)  Body mass index is 27.41 kg/m².  Body surface area is 1.94 meters squared.    Physical Exam  Constitutional:       Appearance: Normal appearance. She is normal weight.   HENT:      Head: Normocephalic.   Eyes:      Extraocular Movements: Extraocular movements intact.      Pupils: Pupils are equal, round, and reactive to light.   Cardiovascular:      Rate and Rhythm: Normal rate and regular rhythm.      Pulses: Normal pulses.   Pulmonary:      Effort: Pulmonary effort is normal.      Breath sounds: Normal breath sounds.   Abdominal:      General: Abdomen is flat. Bowel sounds are normal.      Palpations: Abdomen is soft.   Genitourinary:     Comments: Samuels catheter in place  Musculoskeletal:      Comments: Right UE weakness 4/5 ; right LE weakness minimal   Skin:     General: Skin is warm.      Coloration: Skin is jaundiced.   Neurological:      General: No focal deficit present.      Mental Status: She is alert and oriented to person, place, and time.   Psychiatric:         Mood and Affect: Mood normal.         Significant Labs:   All pertinent labs from the last 24 hours have been reviewed.    Diagnostic Results:  I have reviewed all pertinent imaging results/findings within the past 24 hours.    Assessment/Plan:   Case discussed in detail with Dr Dubois - recommendations made to obtain  brain MRI for treatment/planning purposes;     Tentative plan for 3-5 fractions of radiosurgery to known 2.1cm lesion (however this may change pending MRI results)    Discussed thoroughly with patient with all questions regarding treatment answered in detail     Will plan for CT simulation/consent following brain MRI     Thank you for your consult.     LIZZETH Gregorio  Radiation Oncology  [facility] - [facility] Tele[facility]

## 2025-06-24 NOTE — PT/OT/SLP PROGRESS
Physical Therapy Treatment    Patient Name:  Alondra Snyder   MRN:  [id number]    Recommendations:     Discharge therapy intensity: High Intensity Therapy   Discharge Equipment Recommendations: wheelchair, walker, rolling, bedside commode  Barriers to discharge: Impaired mobility and Ongoing medical needs    Assessment:     Alondra Snyder is a 60 y.o. female admitted with a medical diagnosis of severe fatigue/weakness, hx of metastatic colon CA with lung/liver mets, acute urinary retention, recent Y-90 embolization with acute inflammatory response.  She presents with the following impairments/functional limitations: weakness, impaired endurance, impaired self care skills, impaired functional mobility, gait instability, impaired balance, decreased upper extremity function, decreased lower extremity function . Pt with improved mobility today, remains with R-sided weakness but able to increase ambulatory distance today. Pt with mild attention impairments with dual-task ambulation. Still recommend high intensity therapy at this time.    Rehab Prognosis: Good; patient would benefit from acute skilled PT services to address these deficits and reach maximum level of function.    Recent Surgery: * No surgery found *      Plan:     During this hospitalization, patient would benefit from acute PT services 6 x/week to address the identified rehab impairments via gait training, therapeutic activities, therapeutic exercises, neuromuscular re-education and progress toward the following goals:    Plan of Care Expires:  07/23/25    Subjective     Chief Complaint: weakness  Patient/Family Comments/goals: PLOF  Pain/Comfort:  Pain Rating 1: 0/10      Objective:     Communicated with RN prior to session.  Patient found up in chair with alarcon catheter, peripheral IV, pulse ox (continuous), telemetry upon PT entry to room.     General Precautions: Standard, fall  Orthopedic Precautions:    Braces:    Respiratory Status: Room air SpO2  94%  Blood Pressure: NT  Skin Integrity: sacral foam dressing      Functional Mobility:  Transfers:     Sit to Stand:  minimum assistance with rolling walker  Gait: 2x47ft with RW with Cassy-modA, R knee buckling and R trunk lean in stance. Verbal cues to increase step length on R and for quad activation in stance to prevent buckling. Seated rest between trials. Verbal cues to maintain close proximity to RW.  Balance: Static standing balance = CGA-[place]assy with mild R lean    Therapeutic Activities/Exercises:  3 STS transfers from recliner with Cassy progressing to CGA on last trials with cues for anterior lean and hand placement    Co-Treatment: No    Education:  Patient and spouse were provided with verbal education education regarding PT role/goals/POC, fall prevention, safety awareness, and discharge/DME recommendations.  Understanding was verbalized.     Patient left up in chair with all lines intact, call button in reach, geomat cushion, and RN notified    GOALS:   Multidisciplinary Problems       Physical Therapy Goals          Problem: Physical Therapy    Goal Priority Disciplines Outcome Interventions   Physical Therapy Goal     PT, PT/OT Progressing    Description: Goals to be met by: 25     Patient will increase functional independence with mobility by performin. Supine to sit with [place]  2. Sit to stand transfer with Modified [place]  3. Bed to chair transfer with Modified [place] using Rolling Walker  4. Gait  x 150 feet with Modified [place] using Rolling Walker.   5. Ascend/descend 5 stair with left Handrails Minimal Assistance using No Assistive Device.                          Time Tracking:     PT Received On: 25  PT Start Time: 932     PT Stop Time: 0956  PT Total Time (min): 24 min     Billable Minutes: Gait Training 14 and Therapeutic Activity 10    Treatment Type: Treatment  PT/PTA: PT     Number of PTA visits since last PT visit: 2025

## 2025-06-24 NOTE — PROGRESS NOTES
[facility] Hospital Medicine Progress Note        Chief Complaint: Inpatient Follow-up for generalized weakness, brain met with metastatic renal cancer    HPI:   Reviewed and noted in detail     Interval Hx:   The patient was seen and examined.  Her wife and 2 daughters were at bedside.  As such patient doing much better and has significant improvement in her right upper extremity strength from the IV Decadron.  Is being evaluated by Radiation Oncology to initiate XRT.  MRI has been ordered for treatment planning.    Ten of care has been discussed with them in detail and all their questions have been addressed and answered.      Case was discussed with patient's nurse and  on the floor.    Objective/physical exam:  General: In no acute distress, afebrile  Chest: Clear to auscultation bilaterally  Heart: RRR, +S1, S2, no appreciable murmur  Abdomen: Soft, nontender, BS +  MSK: Warm, no lower extremity edema, no clubbing or cyanosis  Neurologic: Alert and oriented x4, improvement in strength in the right upper extremity to 3-4/5, rest intact    VITAL SIGNS: 24 HRS MIN & MAX LAST   Temp  Min: 96.3 °F (35.7 °C)  Max: 98.2 °F (36.8 °C) 97.8 °F (36.6 °C)   BP  Min: 96/70  Max: 122/73 110/73   Pulse  Min: 70  Max: 84  73   Resp  Min: 16  Max: 19 19   SpO2  Min: 91 %  Max: 97 % (!) 93 %     I have reviewed the following labs:  Recent Labs   Lab 06/22/25  0558 06/23/25  0450 06/24/25  0139   WBC 25.49* 22.79* 18.04*   RBC 3.23* 3.40* 3.42*   HGB 8.4* 8.9* 8.9*   HCT 27.0* 27.9* 28.0*   MCV 83.6 82.1 81.9   MCH 26.0* 26.2* 26.0*   MCHC 31.1* 31.9* 31.8*   RDW 18.0* 18.4* 18.7*   * 893* 886*   MPV 8.5 8.3 8.5     Recent Labs   Lab 06/21/25  1422 06/22/25  0558 06/23/25  0450 06/24/25  0139   * 133* 131* 136   K 3.0* 3.3* 3.4* 3.7   CL 94* 96* 94* 100   CO2 28 30 28 27   BUN 8.6* 8.1* 7.7* 9.3*   CREATININE 0.47* 0.49* 0.48* 0.49*   GLU 90 110 83 138*   CALCIUM 8.4 8.2*  8.2* 8.4   MG 1.50*  --   --  1.70   ALBUMIN 1.5* 1.3* 1.4* 1.3*   PROT 6.2 5.5* 6.0 5.9   ALKPHOS 677* 567* 599* 645*   ALT 21 17 16 16   AST 86* 64* 53* 53*   BILITOT 2.3* 2.1* 2.2* 1.6*     Microbiology Results (last 7 days)       Procedure Component Value Units Date/Time    Urine culture [[id number]]  (Abnormal)  (Susceptibility) Collected: 06/21/25 1344    Order Status: Completed Specimen: Urine Updated: 06/24/25 0824     Urine Culture Less than 10,000 colonies/ml Escherichia coli     Comment: [place] counts of <10,000colonies/ml are of questionable significance. Therefore organisms are identified only.         >/= 100,000 colonies/ml Staphylococcus epidermidis    Blood culture #1 **CANNOT BE ORDERED STAT** [[id number]]  (Normal) Collected: 06/21/25 1553    Order Status: Completed Specimen: Blood Updated: 06/23/25 1702     Blood Culture No Growth At 48 Hours    Blood culture #2 **CANNOT BE ORDERED STAT** [[id number]]  (Normal) Collected: 06/21/25 1600    Order Status: Completed Specimen: Blood Updated: 06/23/25 1702     Blood Culture No Growth At 48 Hours             See below for Radiology    Assessment/Plan:    Metastatic rectal cancer  -now confirmed with new brain metastasis on CTA around 2.1 cm in the left posterior frontal lobe   -doing much better with the IV dexamethasone 4 mg q.6 hours and will be continued   -radiation Oncology consulted, MRI ordered for treatment planning   -appreciate assistance from Dr. Hernandez      Inflammatory response post Y90 embolization on 06/10/2025   -supportive care with IV hydration, electrolyte replacement  -monitor labs and replete as appropriate   -PT/OT to continue treatment as tolerated      Urinary retention   -status post Samuels placement   -urine culture with less than 10,000 colonies of E coli however more than 100,000 colonies of staph epi which is probably contaminant   -currently on Rocephin, will discontinue, completed 3 days     Hypokalemia   -replete and  monitor, keep potassium above 4      Severe protein calorie Malnutrition and hypoalbuminemia  -recommendations per dietary nutrition          Patient condition:  Stable    Anticipated discharge and Disposition:   Possible rehab      All diagnosis and differential diagnosis have been reviewed; assessment and plan has been documented; I have personally reviewed the labs and test results that are presently available; I have reviewed the patients medication list; I have reviewed the consulting providers response and recommendations. I have reviewed or attempted to review medical records based upon their availability    All of the patient's questions have been  addressed and answered. Patient's is agreeable to the above stated plan. I will continue to monitor closely and make adjustments to medical management as needed.    Portions of this note dictated using EMR integrated voice recognition software, and may be subject to voice recognition errors not corrected at proofreading. Please contact writer for clarification if needed.   _____________________________________________________________________    Malnutrition Status:  Nutrition consulted. Most recent weight and BMI monitored-     Measurements:  Wt Readings from Last 1 Encounters:   06/21/25 79.4 kg (175 lb)   Body mass index is 27.41 kg/m².    Patient has been screened and assessed by RD.    Malnutrition Type:  Context:    Level:      Malnutrition Characteristic Summary:       Interventions/Recommendations (treatment strategy):        Scheduled Med:   cefTRIAXone (Rocephin) IV (PEDS and ADULTS)  1 g Intravenous Q24H    dexAMETHasone (Decadron) IV (PEDS and ADULTS)  4 mg Intravenous Q6H    famotidine  20 mg Oral BID    mupirocin   Nasal BID    pantoprazole  40 mg Oral Daily    polyethylene glycol  17 g Oral Daily    potassium chloride  10 mEq Oral BID    sertraline  100 mg Oral QHS    sucralfate  1 g Oral QID (AC & HS)      Continuous Infusions:   0.9% NaCl    Intravenous Continuous 75 mL/hr at 06/23/25 1338 New Bag at 06/23/25 1338      PRN Meds:    Current Facility-Administered Medications:     acetaminophen, 650 mg, Oral, Q8H PRN    hydrocodone-chlorpheniramine, 5 mL, Oral, Q12H PRN    melatonin, 6 mg, Oral, Nightly PRN    ondansetron, 4 mg, Intravenous, Q8H PRN    sodium chloride 0.9%, 10 mL, Intravenous, PRN    traMADoL, 50 mg, Oral, Q8H PRN     Radiology:  I have personally reviewed the following imaging and agree with the radiologist.     CTA Head and Neck (xpd)  Narrative: EXAMINATION:  CTA HEAD AND NECK (XPD)    CLINICAL HISTORY:  Neuro deficit, acute, stroke suspected;    TECHNIQUE:  Axial images obtained through the cervical region and [place] before and after the administration of intravenous contrast.    Coronal, sagittal, MIP and 3D reconstructions were obtained from the axial data.    Automatic exposure control was utilized to limit radiation dose.    Radiation Dose:    Total DLP: 3148 mGy*cm    COMPARISON:  MRI brain dated 07/12/2024, CT chest dated 06/22/2025    FINDINGS:  Head CT with contrast:    There is a 2.1 cm enhancing lesion in the left posterior frontal lobe with surrounding edema.    Local mass effect without midline shift.    If present, stenosis of the carotid bulbs is measured based on NASCET criteria,    i.e. area of maximal stenosis compared to the cervical ICA distal to the bulb.    Cervical CTA:    The origins of the great vessels are patent with mild scattered calcifications.    The common carotid arteries, carotid bulbs and internal carotid are patent.  There are mild calcifications at the left carotid bulb without hemodynamically significant stenosis.    The vertebral arteries are patent.    Intracranial CTA:    The internal carotid arteries, middle cerebral arteries and anterior cerebral arteries are patent    The vertebral arteries, basilar artery and posterior cerebral arteries are patent    The dural venous sinuses are  patent.    Additional findings:    See recent CT chest for findings in the thorax.  Impression: 2.1 cm enhancing mass in the left posterior frontal lobe with surrounding edema, concerning for metastatic disease.    Electronically signed by: Zahira Maguier  Date:    06/23/2025  Time:    13:12      Jaimee Maher MD  Department of Hospital Medicine   [facility]   06/24/2025

## 2025-06-24 NOTE — PT/OT/SLP EVAL
Occupational Therapy   Treatment    Name: Alondra Snyder  MRN: [id number]    Recommendations:     Recommended therapy intensity at discharge: High Intensity Therapy   Discharge Equipment Recommendations:  walker, rolling, bedside commode  Barriers to discharge:  Inaccessible home environment    Assessment:     Alondra Snyder is a 60 y.o. female with a medical diagnosis of profound weakness, PMH of rectal carcinoma, carpal tubbel syndrome, anxiety, recently received Y90 embolization to implant radioactive seeds in liver on Patricia 10,2025 and has been unable to fully ambulate following due to generalized weakness per chart review.  She presents with the following performance deficits affecting function: weakness, impaired endurance, impaired self care skills, impaired functional mobility, impaired balance, decreased coordination, decreased upper extremity function, decreased lower extremity function, decreased safety awareness, impaired coordination, impaired fine motor, impaired cardiopulmonary response to activity.    Patient with good tolerance for today's session. Patient presents with R-sided hemiparesis, impaired R UE coordination, decreased standing balance/tolerance, impaired cardiopulmonary response to activity, decreased endurance, unfamiliarity with adaptive techniques for BADLs, and requires min for functional transfers/mobility impeding independence in meaningful activities of daily living. Patient demo's improvement from previous session as noted by completing lower body dressing with mod A for initiating threading R LE and catheter. Patient also with significant improvement in R UE functional use/ functional AROM noted throughout session demo'ing ability to weightshift into R UE for chair push ups and complete hair brushing w/ R UE. Patient to benefit from continued skilled OT intervention to address aforementioned deficits to reduce risk of falls and improve occupational performance prior to next level  of care.     Rehab Prognosis:  Good; patient would benefit from acute skilled OT services to address these deficits and reach maximum level of function.       Plan:     Patient to be seen 6 x/week to address the above listed problems via self-care/home management, therapeutic activities, therapeutic exercises, neuromuscular re-education  Plan of Care Expires: 07/23/25  Plan of Care Reviewed with: patient, spouse, daughter    Subjective     Pain/Comfort:  Pain Rating 1: 0/10    Objective:     Communicated with: RNAmrit prior to session.  Patient found supine with alarcon catheter, peripheral IV, pulse ox (continuous), SCD, telemetry upon OT entry to room.    General Precautions: Standard, fall    Orthopedic Precautions:N/A  Braces: N/A  Respiratory Status: Room air  Vital Signs: Sp02: 93-94% throughout      Occupational Performance:     Functional Mobility/Transfers:  Bed mobility:    Supine to Sit: minimum assistance  Transfers: Sit to Stand: minimum assistance with rolling walker  Bed to Chair: minimum assistance with rolling walker using Step Transfer; patient with notably limited R LE clearance, decreased R > L LE weightshifting, and proprioceptive-like deficits noted (I.e. exaggerated R hip flexion to ~90* when encouraged to elevate R LE for stepping).     Activities of Daily Living:  Grooming: supervision Patient complete hair brushing in supported long sitting in recliner chair w/ R UE req setup for item retrieval and min vc's for R UE positioning/ cervical positioning to reduce compensatory positioning strategies.   Upper Body Dressing: maximal assistance Don hospital gown as robe short sitting EOB req max A for threading R UE and adjusting around posterior trunk.   Lower Body Dressing: moderate assistance Patient don underwear seated EOB req mod A for threading R LE and catheter; patient demo good ability to thread L LE and adjust over B hips in standing w/ RW  Toileting: total assistance Patient  incontinent of bowel upon sitting EOB, req total assist for perineal hygiene only in standing.     Balance:   Static Sitting Balance: Bilateral UE support: Fair: Patient able to maintain balance with handhold support; may require occasional minimal assistance.    Therapeutic Activities:  Patient, spouse, and daughter educated on R UE fine motor coordination therex, towel slide therex with focus on improved neuromuscular reeducation, use of adaptive built-up device to enhance independence with grooming/feeding occupations, and adaptive techniques for lower body dressing; verbalize understanding to all information provided. Patient allocated built-up  and resistive sponge, educated on use and demo'ed good understanding.      R UE strength re-assessment:  Shoulder flexion 0-~150* in scaption, 4+/5 MMT  Elbow flexion/extension WFL AROM, 4+-5/5 MMT  Gross grasp and wrist flexion/extension 3+/5   WFL forearm pronation/supination with increased time  Impaired full digit opposition, yet with improved single-digit isolation and digit 1-to-2 opposition.     Therapeutic Exercise:  Patient complete 1x5 trials of modified chair push ups in unsupported short sitting at recliner chair level; req min A for B LE anterior knee block and facilitating anterior trunk weightshifting for initial 2/5 trials. Patient improve to req CGA for final 3/5 trials with improved pelvis elevation and R UE/LE weightshifting with incorporation of anterior trunk rocking technique.     Therapeutic Positioning    OT interventions performed during the course of today's session in an effort to prevent and/or reduce acquired pressure injuries:   Education was provided on benefits of and recommendations for therapeutic positioning    Skin assessment: all bony prominences were assessed    Findings: no redness or breakdown noted    [facility] 6 Click ADL: 15      Patient Education:  Patient, spouse were, and daughter/s were provided with verbal education and  demonstrations education regarding OT role/goals/POC, fall prevention, safety awareness, Discharge/DME recommendations, home exercise program , and use of adaptive devices .  Understanding was verbalized.      Patient left up in chair with all lines intact, call button in reach, and RN notified.    GOALS:   Multidisciplinary Problems       Occupational Therapy Goals          Problem: Occupational Therapy    Goal Priority Disciplines Outcome Interventions   Occupational Therapy Goal     OT, PT/OT Progressing    Description: LTG: Pt will perform basic ADLs and ADL transfers with Modified independence using LRAD by discharge.    STG: to be met by 7/6    Pt will complete grooming standing at sink with LRAD with SBA.  Pt will complete UB dressing with SBA.  Pt will complete LB dressing with SBA using LRAD.  Pt will complete toileting with SBA using LRAD.  Pt will complete functional mobility to/from toilet and toilet transfer with SBA using LRAD.                         Time Tracking:     OT Date of Treatment: 06/24/25  OT Start Time: 1533  OT Stop Time: 1608  OT Total Time (min): 35 min    Billable Minutes:Self Care/Home Management 25  Therapeutic Exercise 10      OT/CARLA: OT     Number of CARLA visits since last OT visit: 0    6/24/2025

## 2025-06-24 NOTE — PROGRESS NOTES
Hematology/Oncology  Progress Note    Patient Name: Alondra Snyder  MRN: [id number]  Admission Date: 6/21/2025  Hospital Length of Stay: 3 days  Attending Provider: Jaimee Maher MD  Consulting Provider: Ricardo Hernandez MD  Principal Problem:  Metastatic rectal cancer      Subjective:     HPI:  60-year-old WF followed at formerly [facility] with metastatic rectal cancer.    Treatment History  Resection 3/15 (T2 N1b M0), 3/28+ LN's  Oxaliplatin/Xeloda x6 completed 8/15  --> Xeloda/XRT completed 11/11/15  FOLFIRI + Bevacizumab x 10 (7/24-12/24)  FOLFOX + Bevacizumab x 4 (2/25- 5/25)    Molecular testing:  HER2 negative.  Pathology QNS for additional molecular studies.     [facility] peripheral blood 8/7/24: +KRAS G12D, FRANCESCO.  Positive mutations of APC, PTEN, TP53 and FBXW7     She was started on palliative chemotherapy with a regimen of FOLFIRI and Bevacizumab 7/31/24.  CT C/A/P 10/21/24: DIANA mass 5.9 x 5.4 cm (previous 6.9 x 7.3 cm), left hilar LN 1.5 x 1.1 cm (previous 1.8 x 1.5 cm).  Multiple hypodense liver metastases, largest lesion left hepatic lobe 4.9 cm.            CT C/A/P 12/19/24:  Stable DIANA mass 5.8 x 5.2 cm.  Bilobar hepatic metastases, several showing slight interval enlargement.  Right adrenal nodule 1.2 cm (previous 1 cm).     CT-guided liver biopsy 1/13/25:  Metastatic adenocarcinoma consistent with colorectal primary (CK20 and CDX2+)     [facility] 2/13/25:  Minimal disease progression on imaging 10/24 to 12/24 with slight enlargement of multiple liver lesions and stable appearance of the lung and adrenal metastases.  Recommend transitioning to FOLFOX + Bevacizumab.     CT C/A/P 4/11/25:  DIANA mass decreased 5.0 x 4.8 cm (previous 5.8 x 5.2 cm).  Progressed hepatic metastatic disease, examples anterior left hepatic lobe 4.2 x 4.0 cm (previous 2.7 x 2.3 cm), lateral right hepatic lobe 4.7 x 4.2 cm (previous 3.1 x 2.5 cm), multiple new hepatic metastases.  Right adrenal metastases  2.9 x 1.4 cm (previous 1.6 x 1.1 cm).    Y-90 Chemoembolization lateral left hepatic artery 6/10/25.    She reported improvement in her abdominal pain following the procedure.  8-10 days following her embolization, she developed progressive and profound weakness with difficulty ambulating.  Laboratory testing showed mild dehydration and hypokalemia.  She received IV hydration as an outpatient at [facility] on 6/19/25.  She had transient improvement in her symptoms.  She presented to the ER 6/21/25 with progressive weakness and subjective fever.  She was admitted and given IV hydration, pancultured and started on IV antibiotics.  Bladder scan revealed urinary retention and Samuels catheter was placed.    Physical therapy evaluation 6/23/25 revealed she was significantly weaker on the right side.  CTA head and neck was ordered demonstrating a 2.1 cm enhancing lesion in the left posterior frontal lobe with significant vasogenic edema.  There was no significant vascular stenosis.  She was started on IV Decadron 4 mg every 6 hours.      Today (6/24/25):  Patient awake and alert this morning, sitting up in a chair.  Multiple family members are in the room.  She feels much better this morning.  Her generalized weakness and right-sided weakness are improved on IV Decadron.  She denies any headaches.  Speech is normal and fluent.       Medications:  Continuous Infusions:   0.9% NaCl   Intravenous Continuous 75 mL/hr at 06/23/25 1338 New Bag at 06/23/25 1338     Scheduled Meds:   cefTRIAXone (Rocephin) IV (PEDS and ADULTS)  1 g Intravenous Q24H    dexAMETHasone (Decadron) IV (PEDS and ADULTS)  4 mg Intravenous Q6H    famotidine  20 mg Oral BID    mupirocin   Nasal BID    pantoprazole  40 mg Oral Daily    polyethylene glycol  17 g Oral Daily    potassium chloride  10 mEq Oral BID    sertraline  100 mg Oral QHS    sucralfate  1 g Oral QID (AC & HS)     PRN Meds:  Current Facility-Administered Medications:     acetaminophen, 650 mg,  Oral, Q8H PRN    hydrocodone-chlorpheniramine, 5 mL, Oral, Q12H PRN    melatonin, 6 mg, Oral, Nightly PRN    ondansetron, 4 mg, Intravenous, Q8H PRN    sodium chloride 0.9%, 10 mL, Intravenous, PRN    traMADoL, 50 mg, Oral, Q8H PRN     Review of patient's allergies indicates:  No Known Allergies       PMHx:  Endometriosis, anxiety/depression  PSHx:  Tonsils, sinus surgery, right oophorectomy, partial colectomy  SH:  Lifetime nonsmoker, occasional alcohol use.  Lives in [place] with her significant other.  Works as a .  FH:  Mother had kidney cancer.  A maternal aunt and 1st cousin had breast cancer.    Review of Systems   Constitutional:  Positive for activity change, appetite change, fatigue and fever.   HENT:  Negative for mouth sores, sore throat and trouble swallowing.    Eyes: Negative.    Respiratory:  Negative for cough and shortness of breath.    Cardiovascular:  Negative for chest pain and leg swelling.   Gastrointestinal:  Negative for abdominal pain, constipation, diarrhea, nausea and vomiting.   Genitourinary:  Positive for difficulty urinating. Negative for dysuria, flank pain and frequency.   Musculoskeletal:  Positive for arthralgias. Negative for back pain.   Skin:  Negative for pallor and rash.   Neurological:  Positive for weakness (R>L). Negative for dizziness, numbness and headaches.   Hematological:  Negative for adenopathy. Does not bruise/bleed easily.   Psychiatric/Behavioral: Negative.         Objective:     Vital Signs (Most Recent):  Temp: 97.4 °F (36.3 °C) (06/24/25 0748)  Pulse: 71 (06/24/25 0748)  Resp: 16 (06/23/25 1924)  BP: 99/70 (06/24/25 0748)  SpO2: 97 % (06/24/25 0748) Vital Signs (24h Range):  Temp:  [96.3 °F (35.7 °C)-98.2 °F (36.8 °C)] 97.4 °F (36.3 °C)  Pulse:  [70-84] 71  Resp:  [16-18] 16  SpO2:  [91 %-97 %] 97 %  BP: ()/(63-73) 99/70     Weight: 79.4 kg (175 lb)  Body surface area is 1.94 meters squared.    Physical Exam  Constitutional:        Comments: Well-developed, weak appearing WF in NAD   HENT:      Head: Normocephalic.      Mouth/Throat:      Mouth: Mucous membranes are moist.      Pharynx: Oropharynx is clear. No posterior oropharyngeal erythema.   Eyes:      General: No scleral icterus.     Extraocular Movements: Extraocular movements intact.      Pupils: Pupils are equal, round, and reactive to light.   Cardiovascular:      Rate and Rhythm: Normal rate and regular rhythm.      Heart sounds: No murmur heard.     Comments: MediPort catheter right chest wall  Pulmonary:      Breath sounds: Normal breath sounds.   Abdominal:      General: There is no distension.      Palpations: Abdomen is soft.      Tenderness: There is no abdominal tenderness.      Comments: Bowel sounds decreased.  Well-healed midline incision below umbilicus and laparoscopic sites.  No palpable masses or organomegaly.   Musculoskeletal:         General: Swelling (trace lower extremity edema) present. No tenderness. Normal range of motion.      Cervical back: Neck supple. No tenderness.   Skin:     General: Skin is warm and dry.      Findings: No rash.   Neurological:      General: No focal deficit present.      Mental Status: She is alert and oriented to person, place, and time.      Cranial Nerves: No cranial nerve deficit.      Motor: Weakness (Mild right-sided weakness) present.         Significant Labs:   CBC:   Recent Labs   Lab 06/23/25  0450 06/24/25  0139   WBC 22.79* 18.04*   HGB 8.9* 8.9*   HCT 27.9* 28.0*   * 886*    and CMP:   Recent Labs   Lab 06/23/25  0450 06/24/25  0139   * 136   K 3.4* 3.7   CL 94* 100   CO2 28 27   GLU 83 138*   BUN 7.7* 9.3*   CREATININE 0.48* 0.49*   CALCIUM 8.2* 8.4   PROT 6.0 5.9   ALBUMIN 1.4* 1.3*   BILITOT 2.2* 1.6*   ALKPHOS 599* 645*   AST 53* 53*   ALT 16 16       Diagnostic Results:  All CT images reviewed by me personally.    Impression/Recommendations:   IMPRESSION  Metastatic rectal cancer  Left posterior  frontal brain met with right-sided weakness  Inflammatory response post Y-90 embolization 6/10/25  Leukocytosis and thrombocytosis secondary to above  Acute urinary retention  Hypokalemia - improved  Malnutrition and hypoalbuminemia    RECOMMENDATIONS  CTA findings reviewed and discussed with the patient and her family.  Clinical symptoms improved this morning on IV Decadron.  Continue IV Decadron 4 mg every 6 hours.  Consult Radiation Oncology for treatment planning for stereotactic radiation therapy.  Order MRI of the brain for treatment planning.  Continue PT/OT as tolerated.      TIM HARDY MD  Hematology/Oncology   [facility]

## 2025-06-25 PROCEDURE — 97530 THERAPEUTIC ACTIVITIES: CPT

## 2025-06-25 PROCEDURE — 97116 GAIT TRAINING THERAPY: CPT

## 2025-06-25 PROCEDURE — 25000003 PHARM REV CODE 250: Performed by: INTERNAL MEDICINE

## 2025-06-25 PROCEDURE — 63600175 PHARM REV CODE 636 W HCPCS: Performed by: INTERNAL MEDICINE

## 2025-06-25 PROCEDURE — 99233 SBSQ HOSP IP/OBS HIGH 50: CPT | Mod: ,,, | Performed by: INTERNAL MEDICINE

## 2025-06-25 PROCEDURE — 21400001 HC TELEMETRY ROOM

## 2025-06-25 PROCEDURE — 97110 THERAPEUTIC EXERCISES: CPT

## 2025-06-25 PROCEDURE — 25000003 PHARM REV CODE 250: Performed by: EMERGENCY MEDICINE

## 2025-06-25 RX ORDER — DEXAMETHASONE SODIUM PHOSPHATE 4 MG/ML
4 INJECTION, SOLUTION INTRA-ARTICULAR; INTRALESIONAL; INTRAMUSCULAR; INTRAVENOUS; SOFT TISSUE EVERY 8 HOURS
Status: DISCONTINUED | OUTPATIENT
Start: 2025-06-25 | End: 2025-06-27 | Stop reason: HOSPADM

## 2025-06-25 RX ADMIN — PANTOPRAZOLE SODIUM 40 MG: 40 TABLET, DELAYED RELEASE ORAL at 09:06

## 2025-06-25 RX ADMIN — POTASSIUM CHLORIDE 10 MEQ: 750 TABLET, FILM COATED, EXTENDED RELEASE ORAL at 09:06

## 2025-06-25 RX ADMIN — SERTRALINE HYDROCHLORIDE 100 MG: 50 TABLET ORAL at 09:06

## 2025-06-25 RX ADMIN — FAMOTIDINE 20 MG: 20 TABLET, FILM COATED ORAL at 09:06

## 2025-06-25 RX ADMIN — SUCRALFATE 1 G: 1 TABLET ORAL at 11:06

## 2025-06-25 RX ADMIN — SUCRALFATE 1 G: 1 TABLET ORAL at 06:06

## 2025-06-25 RX ADMIN — SODIUM CHLORIDE: 9 INJECTION, SOLUTION INTRAVENOUS at 09:06

## 2025-06-25 RX ADMIN — MUPIROCIN: 20 OINTMENT TOPICAL at 09:06

## 2025-06-25 RX ADMIN — DEXAMETHASONE SODIUM PHOSPHATE 4 MG: 4 INJECTION, SOLUTION INTRA-ARTICULAR; INTRALESIONAL; INTRAMUSCULAR; INTRAVENOUS; SOFT TISSUE at 02:06

## 2025-06-25 RX ADMIN — DEXAMETHASONE SODIUM PHOSPHATE 4 MG: 4 INJECTION, SOLUTION INTRA-ARTICULAR; INTRALESIONAL; INTRAMUSCULAR; INTRAVENOUS; SOFT TISSUE at 05:06

## 2025-06-25 RX ADMIN — SUCRALFATE 1 G: 1 TABLET ORAL at 09:06

## 2025-06-25 RX ADMIN — DEXAMETHASONE SODIUM PHOSPHATE 4 MG: 4 INJECTION, SOLUTION INTRA-ARTICULAR; INTRALESIONAL; INTRAMUSCULAR; INTRAVENOUS; SOFT TISSUE at 09:06

## 2025-06-25 RX ADMIN — DEXAMETHASONE SODIUM PHOSPHATE 4 MG: 4 INJECTION, SOLUTION INTRA-ARTICULAR; INTRALESIONAL; INTRAMUSCULAR; INTRAVENOUS; SOFT TISSUE at 12:06

## 2025-06-25 RX ADMIN — SUCRALFATE 1 G: 1 TABLET ORAL at 05:06

## 2025-06-25 NOTE — PT/OT/SLP PROGRESS
Physical Therapy Treatment    Patient Name:  Alondra Snyder   MRN:  [id number]    Recommendations:     Discharge therapy intensity: Low Intensity Therapy with 24/7 assist from spouse and daughter   Discharge Equipment Recommendations: walker, rolling (may benefit from transport chair for community)  Barriers to discharge: Ongoing medical needs    Assessment:     Alondra Snyder is a 60 y.o. female admitted with a medical diagnosis of severe fatigue/weakness, hx of metastatic colon CA with lung/liver mets, acute urinary retention, recent Y-90 embolization with acute inflammatory response.  She presents with the following impairments/functional limitations: weakness, impaired endurance, impaired self care skills, impaired functional mobility, gait instability, impaired balance, decreased upper extremity function, decreased lower extremity function.    Pt with great effort to second therapy session this afternoon. Pt completed toe taps and step ups/down on 6 inch box (min A with LUE support) in preparation for stair training. Noted fatigue of RLE with increased reps/time req seated rest breaks throughout. Will continue to progress RLE strength and mobility as able.     Of note: Pt's significant other demo good technique donning gait belt and assisting pt with sit to stand transfers.     Rehab Prognosis: Good; patient would benefit from acute skilled PT services to address these deficits and reach maximum level of function.    Recent Surgery: * No surgery found *      Plan:     During this hospitalization, patient would benefit from acute PT services BID to address the identified rehab impairments via gait training, therapeutic activities, therapeutic exercises, neuromuscular re-education and progress toward the following goals:    Plan of Care Expires:  07/23/25    Subjective     Chief Complaint: none reported   Patient/Family Comments/goals: get stronger   Pain/Comfort:  Pain Rating 1: 0/10      Objective:      Communicated with nsg prior to session.  Patient found HOB elevated with peripheral IV, alarcon catheter, pulse ox (continuous), telemetry upon PT entry to room.     General Precautions: Standard, fall  Orthopedic Precautions:  N/A  Braces:  N/A  Respiratory Status: Room air  Blood Pressure: NT  Skin Integrity: Visible skin intact      Functional Mobility:  Bed Mobility:     Scooting: stand by assistance  Supine to Sit: contact guard assistance  Sit to Supine: minimum assistance  Min A for BLEs  Transfers:    Sit to stand: Min A with RW   3 reps total from bed     Therapeutic Activities/Exercises:        Unilateral toe tapping at 6 inch step (using RW for support)  2x10 (on each LE)  R lateral lean and increased difficulty stabilizing self when standing on RLE to tap up with LLE req Min A to maintain balance.   Increased difficulty with foot clearance on R side compensating using increased hip flexion.         Step ups/downs at 6 inch step - Min A throughout (using LUE support only)  5 reps ascending with RLE and descending with LLE   5 reps ascending with LLE and descending with RLE  Pt hitting box with toes intermittently on R side; able to correct and achieve foot clearance without cues.   Noted fatigue of R side towards end of reps (shakiness); no major LOB    Co-Treatment: No    Education:  Patient and significant other were provided with verbal education education regarding PT role/goals/POC, fall prevention, and safety awareness.  Understanding was verbalized, however additional teaching warranted.     Patient left HOB elevated with all lines intact, call button in reach, and spouse and daughter present    DME Justification:   Alondra's mobility limitation cannot be sufficiently resolved by the use of a cane. Her functional mobility deficit can be sufficiently resolved with the use of a Rolling Walker. Patient's mobility limitation significantly impairs their ability to participate in one of more activities  of daily living.  The use of a RW will significantly improve the patient's ability to participate in MRADLS and the patient will use it on regular basis in the home.    GOALS:   Multidisciplinary Problems       Physical Therapy Goals          Problem: Physical Therapy    Goal Priority Disciplines Outcome Interventions   Physical Therapy Goal     PT, PT/OT Progressing    Description: Goals to be met by: 25     Patient will increase functional independence with mobility by performin. Supine to sit with [place]  2. Sit to stand transfer with Modified [place]  3. Bed to chair transfer with Modified [place] using Rolling Walker  4. Gait  x 150 feet with Modified [place] using Rolling Walker.   5. Ascend/descend 5 stair with left Handrails Minimal Assistance using No Assistive Device.                          Time Tracking:     PT Received On: 25  PT Start Time: 1541     PT Stop Time: 1612  PT Total Time (min): 31 min     Billable Minutes: Therapeutic Exercise 31    Treatment Type: Treatment  PT/PTA: PT     Number of PTA visits since last PT visit: 3     2025

## 2025-06-25 NOTE — PT/OT/SLP PROGRESS
Physical Therapy Treatment    Patient Name:  Alondra Snyder   MRN:  [id number]    Recommendations:     Discharge therapy intensity: Low Intensity Therapy with 24/7 assist from spouse and daughter  Discharge Equipment Recommendations: walker, rolling (may benefit from transport chair for community)  Barriers to discharge: Ongoing medical needs    Assessment:     Alondra Snyder is a 60 y.o. female admitted with a medical diagnosis of severe fatigue/weakness, hx of metastatic colon CA with lung/liver mets, acute urinary retention, recent Y-90 embolization with acute inflammatory response.  She presents with the following impairments/functional limitations: weakness, impaired endurance, impaired self care skills, impaired functional mobility, gait instability, impaired balance, decreased upper extremity function, decreased lower extremity function . Pt continues to improve with mobility. After discussion with pt and spouse, they would like to return home from this hospitalization so as not to delay radiation tx for brain mets. Initiated family training for gait belt use, transfers, and ambulation with assist from wife. Will educate on step negotiation at next tx session. Increasing POC to BID to prepare for d/c home.    Rehab Prognosis: Good; patient would benefit from acute skilled PT services to address these deficits and reach maximum level of function.    Recent Surgery: * No surgery found *      Plan:     During this hospitalization, patient would benefit from acute PT services BID to address the identified rehab impairments via gait training, therapeutic activities, therapeutic exercises, neuromuscular re-education and progress toward the following goals:    Plan of Care Expires:  07/23/25    Subjective     Chief Complaint: none  Patient/Family Comments/goals: PLOF  Pain/Comfort:  Pain Rating 1: 0/10      Objective:     Communicated with RN prior to session.  Patient found sitting on bedside commode with agnes  catheter, peripheral IV, pulse ox (continuous), telemetry upon PT entry to room.     General Precautions: Standard, fall  Orthopedic Precautions:    Braces:    Respiratory Status: Room air  Blood Pressure: NT  Skin Integrity: Visible skin intact      Functional Mobility:  Transfers:     Sit to Stand:  minimum assistance with rolling walker  Bed to Chair: minimum assistance with  rolling walker  using  Stand Pivot  Gait: 2x30ft with turns, [place] 2/2 R trunk lean and R knee instability. Cues to remain close to RW. Improvements in RLE advancement/foot clearance this date.  Balance: static sitting = SBA, static standing = CGA due to R lean/proprioceptive deficits    Therapeutic Activities/Exercises:  PT demo'd gait belt donning/doffing, STS transfers, and ambulation. Emphasis on guarding of weak side and appropriate body mechanics. Pt's spouse receptive education, able to return demonstration with these tasks and provided adequate assist for mobility. Cleared pt's spouse to t/f pt bed<>chair with gait belt and RW.    Co-Treatment: No    Education:  Patient and spouse were provided with verbal education and demonstrations education regarding PT role/goals/POC, fall prevention, safety awareness, and discharge/DME recommendations.  Understanding was verbalized.     Patient left up in chair with all lines intact, call button in reach, geomat cushion, RN notified, and wife present    DME Justification:   Alondra's mobility limitation cannot be sufficiently resolved by the use of a cane. Her functional mobility deficit can be sufficiently resolved with the use of a Walker. Patient's mobility limitation significantly impairs their ability to participate in one of more activities of daily living.  The use of a Walker will significantly improve the patient's ability to participate in MRADLS and the patient will use it on regular basis in the home.    GOALS:   Multidisciplinary Problems       Physical Therapy Goals           Problem: Physical Therapy    Goal Priority Disciplines Outcome Interventions   Physical Therapy Goal     PT, PT/OT Progressing    Description: Goals to be met by: 25     Patient will increase functional independence with mobility by performin. Supine to sit with [place]  2. Sit to stand transfer with Modified [place]  3. Bed to chair transfer with Modified [place] using Rolling Walker  4. Gait  x 150 feet with Modified [place] using Rolling Walker.   5. Ascend/descend 5 stair with left Handrails Minimal Assistance using No Assistive Device.                          Time Tracking:     PT Received On: 25  PT Start Time: 844     PT Stop Time: 922  PT Total Time (min): 38 min     Billable Minutes: Gait Training 8 and Therapeutic Activity 30    Treatment Type: Treatment  PT/PTA: PT     Number of PTA visits since last PT visit: 2     2025

## 2025-06-25 NOTE — PLAN OF CARE
Problem: Adult Inpatient Plan of Care  Goal: Plan of Care Review  Outcome: Progressing  Goal: Patient-Specific Goal (Individualized)  Outcome: Progressing  Goal: Absence of Hospital-Acquired Illness or Injury  Outcome: Progressing  Goal: Optimal Comfort and Wellbeing  Outcome: Progressing  Goal: Readiness for Transition of Care  Outcome: Progressing     Problem: Skin Injury Risk Increased  Goal: Skin Health and Integrity  Outcome: Progressing     Problem: Wound  Goal: Optimal Coping  Outcome: Progressing  Goal: Optimal Functional Ability  Outcome: Progressing  Goal: Absence of Infection Signs and Symptoms  Outcome: Progressing  Goal: Improved Oral Intake  Outcome: Progressing  Goal: Optimal Pain Control and Function  Outcome: Progressing  Goal: Skin Health and Integrity  Outcome: Progressing  Goal: Optimal Wound Healing  Outcome: Progressing     Problem: Infection  Goal: Absence of Infection Signs and Symptoms  Outcome: Progressing

## 2025-06-25 NOTE — PROGRESS NOTES
[facility] Hospital Medicine Progress Note        Chief Complaint: Inpatient Follow-up for metastatic rectal cancer    HPI:   Reviewed and noted in detail     Interval Hx:   The patient was seen and examined.  Her wife and daughter were at bedside at the time.  Participated with therapy earlier today.  Appetite seems to be okay.  Continues to improve with Decadron which will be continued.  Patient being evaluated by Radiation Oncology to start treatment with XRT and imaging studies ordered for treatment planning.  No other acute needs or complaints as such at the time.      Case was discussed with patient's nurse and  on the floor.    Objective/physical exam:  General: In no acute distress, afebrile  Chest: Clear to auscultation bilaterally  Heart: RRR, +S1, S2, no appreciable murmur  Abdomen: Soft, nontender, BS +  MSK: Warm, no lower extremity edema, no clubbing or cyanosis  Neurologic: Alert and oriented x4, improving weakness in the right upper extremity     VITAL SIGNS: 24 HRS MIN & MAX LAST   Temp  Min: 97.3 °F (36.3 °C)  Max: 97.6 °F (36.4 °C) 97.6 °F (36.4 °C)   BP  Min: 105/69  Max: 126/80 126/80   Pulse  Min: 65  Max: 76  72   Resp  Min: 18  Max: 23 18   SpO2  Min: 91 %  Max: 97 % 97 %     I have reviewed the following labs:  Recent Labs   Lab 06/22/25  0558 06/23/25  0450 06/24/25  0139   WBC 25.49* 22.79* 18.04*   RBC 3.23* 3.40* 3.42*   HGB 8.4* 8.9* 8.9*   HCT 27.0* 27.9* 28.0*   MCV 83.6 82.1 81.9   MCH 26.0* 26.2* 26.0*   MCHC 31.1* 31.9* 31.8*   RDW 18.0* 18.4* 18.7*   * 893* 886*   MPV 8.5 8.3 8.5     Recent Labs   Lab 06/21/25  1422 06/22/25  0558 06/23/25  0450 06/24/25  0139   * 133* 131* 136   K 3.0* 3.3* 3.4* 3.7   CL 94* 96* 94* 100   CO2 28 30 28 27   BUN 8.6* 8.1* 7.7* 9.3*   CREATININE 0.47* 0.49* 0.48* 0.49*   GLU 90 110 83 138*   CALCIUM 8.4 8.2* 8.2* 8.4   MG 1.50*  --   --  1.70   ALBUMIN 1.5* 1.3* 1.4* 1.3*   PROT 6.2 5.5* 6.0  5.9   ALKPHOS 677* 567* 599* 645*   ALT 21 17 16 16   AST 86* 64* 53* 53*   BILITOT 2.3* 2.1* 2.2* 1.6*     Microbiology Results (last 7 days)       Procedure Component Value Units Date/Time    Blood culture #1 **CANNOT BE ORDERED STAT** [[id number]]  (Normal) Collected: 06/21/25 1553    Order Status: Completed Specimen: Blood Updated: 06/24/25 1702     Blood Culture No Growth At 72 Hours    Blood culture #2 **CANNOT BE ORDERED STAT** [[id number]]  (Normal) Collected: 06/21/25 1600    Order Status: Completed Specimen: Blood Updated: 06/24/25 1702     Blood Culture No Growth At 72 Hours    Urine culture [[id number]]  (Abnormal)  (Susceptibility) Collected: 06/21/25 1344    Order Status: Completed Specimen: Urine Updated: 06/24/25 0824     Urine Culture Less than 10,000 colonies/ml Escherichia coli     Comment: [place] counts of <10,000colonies/ml are of questionable significance. Therefore organisms are identified only.         >/= 100,000 colonies/ml Staphylococcus epidermidis             See below for Radiology    Assessment/Plan:    Metastatic rectal cancer  -now confirmed with new brain metastasis on CTA around 2.1 cm in the left posterior frontal lobe   -doing much better with the IV dexamethasone 4 mg q.6 hours and will be continued   -radiation Oncology consulted, MRI ordered for treatment planning   -appreciate assistance from Dr. Hernandez      Inflammatory response post Y90 embolization on 06/10/2025   -supportive care with IV hydration, electrolyte replacement  -monitor labs and replete as appropriate   -PT/OT to continue treatment as tolerated      Urinary retention   -status post Samuels placement   -urine culture with less than 10,000 colonies of E coli however more than 100,000 colonies of staph epi which is probably contaminant   -completed 3 days of Rocephin and then discontinued     Hypokalemia   -repleted and improved ,keep potassium above 4      Severe protein calorie Malnutrition and  hypoalbuminemia  -recommendations per dietary nutrition     Patient condition:  Stable     Anticipated discharge and Disposition:   Possible rehab      All diagnosis and differential diagnosis have been reviewed; assessment and plan has been documented; I have personally reviewed the labs and test results that are presently available; I have reviewed the patients medication list; I have reviewed the consulting providers response and recommendations. I have reviewed or attempted to review medical records based upon their availability    All of the patient's questions have been  addressed and answered. Patient's is agreeable to the above stated plan. I will continue to monitor closely and make adjustments to medical management as needed.    Portions of this note dictated using EMR integrated voice recognition software, and may be subject to voice recognition errors not corrected at proofreading. Please contact writer for clarification if needed.   _____________________________________________________________________    Malnutrition Status:  Nutrition consulted. Most recent weight and BMI monitored-     Measurements:  Wt Readings from Last 1 Encounters:   06/21/25 79.4 kg (175 lb)   Body mass index is 27.41 kg/m².    Patient has been screened and assessed by RD.    Malnutrition Type:  Context:    Level:      Malnutrition Characteristic Summary:       Interventions/Recommendations (treatment strategy):        Scheduled Med:   dexAMETHasone (Decadron) IV (PEDS and ADULTS)  4 mg Intravenous Q8H    famotidine  20 mg Oral BID    mupirocin   Nasal BID    pantoprazole  40 mg Oral Daily    polyethylene glycol  17 g Oral Daily    potassium chloride  10 mEq Oral BID    sertraline  100 mg Oral QHS    sucralfate  1 g Oral QID (AC & HS)      Continuous Infusions:   0.9% NaCl   Intravenous Continuous 75 mL/hr at 06/25/25 0946 New Bag at 06/25/25 0946      PRN Meds:    Current Facility-Administered Medications:     acetaminophen, 650  mg, Oral, Q8H PRN    hydrocodone-chlorpheniramine, 5 mL, Oral, Q12H PRN    melatonin, 6 mg, Oral, Nightly PRN    ondansetron, 4 mg, Intravenous, Q8H PRN    sodium chloride 0.9%, 10 mL, Intravenous, PRN    traMADoL, 50 mg, Oral, Q8H PRN     Radiology:  I have personally reviewed the following imaging and agree with the radiologist.     MRI Brain Stealth  Narrative: EXAMINATION:  Limited axial MRI images of the brain are performed with contrast according to the stealth protocol.  Since this is not a diagnostic examination, there is no professional interpretation charge rendered.  Impression: As above.    Electronically signed by: Agustín Montano  Date:    06/24/2025  Time:    18:14      Jaimee Maher MD  Department of Hospital Medicine   [facility]   06/25/2025

## 2025-06-26 ENCOUNTER — TELEPHONE (OUTPATIENT)
Dept: INTERNAL MEDICINE | Facility: CLINIC | Age: 60
End: 2025-06-26
Payer: COMMERCIAL

## 2025-06-26 PROBLEM — R53.1 WEAKNESS: Status: ACTIVE | Noted: 2025-06-26

## 2025-06-26 LAB
BACTERIA BLD CULT: NORMAL
BACTERIA BLD CULT: NORMAL

## 2025-06-26 PROCEDURE — 77338 DESIGN MLC DEVICE FOR IMRT: CPT | Performed by: RADIOLOGY

## 2025-06-26 PROCEDURE — 25000003 PHARM REV CODE 250: Performed by: INTERNAL MEDICINE

## 2025-06-26 PROCEDURE — 63600175 PHARM REV CODE 636 W HCPCS: Performed by: INTERNAL MEDICINE

## 2025-06-26 PROCEDURE — 25000003 PHARM REV CODE 250: Performed by: EMERGENCY MEDICINE

## 2025-06-26 PROCEDURE — 97116 GAIT TRAINING THERAPY: CPT

## 2025-06-26 PROCEDURE — 97535 SELF CARE MNGMENT TRAINING: CPT

## 2025-06-26 PROCEDURE — 99239 HOSP IP/OBS DSCHRG MGMT >30: CPT | Mod: ,,, | Performed by: INTERNAL MEDICINE

## 2025-06-26 PROCEDURE — 77301 RADIOTHERAPY DOSE PLAN IMRT: CPT | Performed by: RADIOLOGY

## 2025-06-26 PROCEDURE — 51702 INSERT TEMP BLADDER CATH: CPT

## 2025-06-26 PROCEDURE — 77300 RADIATION THERAPY DOSE PLAN: CPT | Performed by: RADIOLOGY

## 2025-06-26 PROCEDURE — 99223 1ST HOSP IP/OBS HIGH 75: CPT | Mod: ,,, | Performed by: NURSE PRACTITIONER

## 2025-06-26 PROCEDURE — 99233 SBSQ HOSP IP/OBS HIGH 50: CPT | Mod: ,,, | Performed by: INTERNAL MEDICINE

## 2025-06-26 PROCEDURE — 21400001 HC TELEMETRY ROOM

## 2025-06-26 RX ORDER — DEXAMETHASONE 4 MG/1
4 TABLET ORAL EVERY 8 HOURS
Qty: 90 TABLET | Refills: 0 | Status: SHIPPED | OUTPATIENT
Start: 2025-06-26 | End: 2025-07-26

## 2025-06-26 RX ADMIN — FAMOTIDINE 20 MG: 20 TABLET, FILM COATED ORAL at 09:06

## 2025-06-26 RX ADMIN — DEXAMETHASONE SODIUM PHOSPHATE 4 MG: 4 INJECTION, SOLUTION INTRA-ARTICULAR; INTRALESIONAL; INTRAMUSCULAR; INTRAVENOUS; SOFT TISSUE at 06:06

## 2025-06-26 RX ADMIN — PANTOPRAZOLE SODIUM 40 MG: 40 TABLET, DELAYED RELEASE ORAL at 09:06

## 2025-06-26 RX ADMIN — SUCRALFATE 1 G: 1 TABLET ORAL at 04:06

## 2025-06-26 RX ADMIN — DEXAMETHASONE SODIUM PHOSPHATE 4 MG: 4 INJECTION, SOLUTION INTRA-ARTICULAR; INTRALESIONAL; INTRAMUSCULAR; INTRAVENOUS; SOFT TISSUE at 02:06

## 2025-06-26 RX ADMIN — POTASSIUM CHLORIDE 10 MEQ: 750 TABLET, FILM COATED, EXTENDED RELEASE ORAL at 09:06

## 2025-06-26 RX ADMIN — SUCRALFATE 1 G: 1 TABLET ORAL at 11:06

## 2025-06-26 RX ADMIN — MUPIROCIN: 20 OINTMENT TOPICAL at 09:06

## 2025-06-26 RX ADMIN — SUCRALFATE 1 G: 1 TABLET ORAL at 09:06

## 2025-06-26 RX ADMIN — SERTRALINE HYDROCHLORIDE 100 MG: 50 TABLET ORAL at 09:06

## 2025-06-26 RX ADMIN — SODIUM CHLORIDE: 9 INJECTION, SOLUTION INTRAVENOUS at 11:06

## 2025-06-26 RX ADMIN — DEXAMETHASONE SODIUM PHOSPHATE 4 MG: 4 INJECTION, SOLUTION INTRA-ARTICULAR; INTRALESIONAL; INTRAMUSCULAR; INTRAVENOUS; SOFT TISSUE at 09:06

## 2025-06-26 RX ADMIN — ACETAMINOPHEN 650 MG: 325 TABLET ORAL at 09:06

## 2025-06-26 NOTE — PLAN OF CARE
I spoke to pt and fmly and they are thinking HH with PT/OT would likely be their plan. [facility] HH list provided. They would also like a RW.  Rw referral to [facility] via email.     [facility] hh selected - referral sent to hh

## 2025-06-26 NOTE — PROGRESS NOTES
Hematology/Oncology  Progress Note    Patient Name: Alondra Snyder  MRN: [id number]  Admission Date: 6/21/2025  Hospital Length of Stay: 5 days  Attending Provider: Jaimee Maher MD  Consulting Provider: Ricardo Hernandez MD  Principal Problem:  Metastatic rectal cancer      Subjective:     HPI:  60-year-old WF followed at [facility] with metastatic rectal cancer.    Treatment History  Resection 3/15 (T2 N1b M0), 3/28+ LN's  Oxaliplatin/Xeloda x6 completed 8/15  --> Xeloda/XRT completed 11/11/15  FOLFIRI + Bevacizumab x 10 (7/24-12/24)  FOLFOX + Bevacizumab x 4 (2/25- 5/25)    Molecular testing:  HER2 negative.  Pathology QNS for additional molecular studies.     [facility] peripheral blood 8/7/24: +KRAS G12D, FRANCESCO.  Positive mutations of APC, PTEN, TP53 and FBXW7     She was started on palliative chemotherapy with a regimen of FOLFIRI and Bevacizumab 7/31/24.  CT C/A/P 10/21/24: DIANA mass 5.9 x 5.4 cm (previous 6.9 x 7.3 cm), left hilar LN 1.5 x 1.1 cm (previous 1.8 x 1.5 cm).  Multiple hypodense liver metastases, largest lesion left hepatic lobe 4.9 cm.            CT C/A/P 12/19/24:  Stable DIANA mass 5.8 x 5.2 cm.  Bilobar hepatic metastases, several showing slight interval enlargement.  Right adrenal nodule 1.2 cm (previous 1 cm).     CT-guided liver biopsy 1/13/25:  Metastatic adenocarcinoma consistent with colorectal primary (CK20 and CDX2+)     [facility] 2/13/25:  Minimal disease progression on imaging 10/24 to 12/24 with slight enlargement of multiple liver lesions and stable appearance of the lung and adrenal metastases.  Recommend transitioning to FOLFOX + Bevacizumab.     CT C/A/P 4/11/25:  DIANA mass decreased 5.0 x 4.8 cm (previous 5.8 x 5.2 cm).  Progressed hepatic metastatic disease, examples anterior left hepatic lobe 4.2 x 4.0 cm (previous 2.7 x 2.3 cm), lateral right hepatic lobe 4.7 x 4.2 cm (previous 3.1 x 2.5 cm), multiple new hepatic metastases.  Right adrenal metastases  2.9 x 1.4 cm (previous 1.6 x 1.1 cm).    Y-90 Chemoembolization lateral left hepatic artery 6/10/25.    She reported improvement in her abdominal pain following the procedure.  8-10 days following her embolization, she developed progressive and profound weakness with difficulty ambulating.  Laboratory testing showed mild dehydration and hypokalemia.  She received IV hydration as an outpatient at [facility] on 6/19/25.  She had transient improvement in her symptoms.  She presented to the ER 6/21/25 with progressive weakness and subjective fever.  She was admitted and given IV hydration, pancultured and started on IV antibiotics.  Bladder scan revealed urinary retention and Samuels catheter was placed.    Physical therapy evaluation 6/23/25 revealed she was significantly weaker on the right side.  CTA head and neck was ordered demonstrating a 2.1 cm enhancing lesion in the left posterior frontal lobe with significant vasogenic edema.  There was no significant vascular stenosis.  She was started on IV Decadron 4 mg every 6 hours with significant improvement in her neurologic symptoms.  Radiation Oncology was consulted for stereotactic radiation therapy.      Today (6/26/25):  Patient continues to show gradual improvement.  She is awake and alert this morning.  Her wife is at the bedside and multiple family members are in the room.  Her family had multiple questions.  Her wife expressed frustration with my office as she felt that her symptoms were not addressed more aggressively when she initially presented.  Her level of care was appropriate at all times.  Family also requesting consultation with Palliative Care to discuss goals of care.       Medications:  Continuous Infusions:   0.9% NaCl   Intravenous Continuous 75 mL/hr at 06/25/25 0946 New Bag at 06/25/25 0946     Scheduled Meds:   dexAMETHasone (Decadron) IV (PEDS and ADULTS)  4 mg Intravenous Q8H    famotidine  20 mg Oral BID    mupirocin   Nasal BID    pantoprazole  40  mg Oral Daily    polyethylene glycol  17 g Oral Daily    potassium chloride  10 mEq Oral BID    sertraline  100 mg Oral QHS    sucralfate  1 g Oral QID (AC & HS)     PRN Meds:  Current Facility-Administered Medications:     acetaminophen, 650 mg, Oral, Q8H PRN    hydrocodone-chlorpheniramine, 5 mL, Oral, Q12H PRN    melatonin, 6 mg, Oral, Nightly PRN    ondansetron, 4 mg, Intravenous, Q8H PRN    sodium chloride 0.9%, 10 mL, Intravenous, PRN    traMADoL, 50 mg, Oral, Q8H PRN     Review of patient's allergies indicates:  No Known Allergies       PMHx:  Endometriosis, anxiety/depression  PSHx:  Tonsils, sinus surgery, right oophorectomy, partial colectomy  SH:  Lifetime nonsmoker, occasional alcohol use.  Lives in [place] with her significant other.  Works as a .  FH:  Mother had kidney cancer.  A maternal aunt and 1st cousin had breast cancer.    Review of Systems   Constitutional:  Positive for activity change (Improving). Negative for appetite change, fatigue and fever.   HENT:  Negative for mouth sores, sore throat and trouble swallowing.    Eyes: Negative.    Respiratory:  Negative for cough and shortness of breath.    Cardiovascular:  Negative for chest pain and leg swelling.   Gastrointestinal:  Negative for abdominal pain, constipation, diarrhea and nausea.   Genitourinary:  Negative for dysuria, flank pain and frequency.   Musculoskeletal:  Positive for arthralgias. Negative for back pain.   Skin:  Negative for pallor and rash.   Neurological:  Positive for weakness (R>L). Negative for dizziness, numbness and headaches.   Hematological:  Negative for adenopathy. Does not bruise/bleed easily.   Psychiatric/Behavioral: Negative.         Objective:     Vital Signs (Most Recent):  Temp: 97.6 °F (36.4 °C) (06/26/25 0747)  Pulse: 66 (06/26/25 0747)  Resp: 18 (06/25/25 1121)  BP: 130/75 (06/26/25 0747)  SpO2: (!) 93 % (06/26/25 0747) Vital Signs (24h Range):  Temp:  [97.4 °F (36.3 °C)-97.9  "°F (36.6 °C)] 97.6 °F (36.4 °C)  Pulse:  [55-85] 66  Resp:  [18] 18  SpO2:  [93 %-97 %] 93 %  BP: (120-133)/(71-80) 130/75     Weight: 79.4 kg (175 lb)  Body surface area is 1.94 meters squared.    Physical Exam  Constitutional:       Comments: Well-developed, weak appearing WF in NAD   HENT:      Head: Normocephalic.      Mouth/Throat:      Mouth: Mucous membranes are moist.      Pharynx: Oropharynx is clear. No posterior oropharyngeal erythema.   Eyes:      General: No scleral icterus.     Extraocular Movements: Extraocular movements intact.      Pupils: Pupils are equal, round, and reactive to light.   Cardiovascular:      Rate and Rhythm: Normal rate and regular rhythm.      Heart sounds: No murmur heard.     Comments: MediPort catheter right chest wall  Pulmonary:      Comments: Lungs clear anteriorly  Abdominal:      General: There is no distension.      Palpations: Abdomen is soft.      Tenderness: There is no abdominal tenderness.      Comments: Bowel sounds decreased.  Well-healed midline incision below umbilicus and laparoscopic sites.  No palpable masses or organomegaly.   Musculoskeletal:         General: Swelling (trace lower extremity edema) present. No tenderness. Normal range of motion.      Cervical back: Neck supple. No tenderness.   Skin:     General: Skin is warm and dry.      Findings: No rash.   Neurological:      General: No focal deficit present.      Mental Status: She is alert and oriented to person, place, and time.      Cranial Nerves: No cranial nerve deficit.      Motor: Weakness (Right-sided weakness improved) present.         Significant Labs:   CBC:   No results for input(s): "WBC", "HGB", "HCT", "PLT" in the last 48 hours.   and CMP:   No results for input(s): "NA", "K", "CL", "CO2", "GLU", "BUN", "CREATININE", "CALCIUM", "PROT", "ALBUMIN", "BILITOT", "ALKPHOS", "AST", "ALT", "ANIONGAP", "EGFRNONAA" in the last 48 hours.    Invalid input(s): "ESTGFAFR[place]"      Diagnostic " Results:  No new imaging for review    Impression/Recommendations:   IMPRESSION  Metastatic rectal cancer  Left posterior frontal brain met with right-sided weakness  Inflammatory response post Y-90 embolization 6/10/25  Leukocytosis and thrombocytosis secondary to above  Acute urinary retention  Hypokalemia - improved  Malnutrition and hypoalbuminemia    RECOMMENDATIONS  Patient's neurologic symptoms improved on IV Decadron.  Continue PT/OT, tolerating well.  Radiation Oncology working on treatment plan for stereotactic radiation therapy which can be done as an outpatient.  Decadron changed to 4 mg every 8 hours on 6/25/25.  Change to p.o. at the time of discharge.  Case management evaluating for home health and home physical therapy.  Current hospitalization, prognosis and treatment plan discussed in detail with the patient, her wife and family members over 30 minutes.  They all understand that her cancer is incurable and treatment goals remain palliative including relief of symptoms, prevention of disease-related complications and prolongation of life.  Will consult the Palliative Care team at the family's request.  I will arrange outpatient follow-up at [facility] following discharge.  I remain available if needed.  Please call if any questions.      TIM HARDY MD  Hematology/Oncology   [facility]

## 2025-06-26 NOTE — PT/OT/SLP PROGRESS
Occupational Therapy   Treatment    Name: Alondra Snyder  MRN: [id number]    Recommendations:     Recommended therapy intensity at discharge: Low Intensity Therapy (with 24/7 care)   Discharge Equipment Recommendations:  bath bench, walker, rolling  Barriers to discharge:  None    Assessment:     Alondra Snyder is a 60 y.o. female with a medical diagnosis of profound weakness, PMH of rectal carcinoma, carpal tubbel syndrome, anxiety, recently received Y90 embolization to implant radioactive seeds in liver on Patricia 10,2025 and has been unable to fully ambulate following due to generalized weakness per chart review.    Performance deficits affecting function are weakness, impaired endurance, impaired self care skills, impaired functional mobility, gait instability, impaired balance, decreased upper extremity function, decreased lower extremity function, decreased safety awareness, decreased ROM, impaired coordination, impaired fine motor.     Patient with discharge orders to go home but does not want to leave today. Patient eating upon OT arrival, so OT demonstrated use of AD for LB dressing and bathing while patient ate. Will allow patient to return demonstration tomorrow if she does not discharge today.    DME Justification:  Josianes mobility limitation cannot be sufficiently resolved by the use of a cane. Her functional mobility deficit can be sufficiently resolved with the use of a Rolling Walker. Patient's mobility limitation significantly impairs their ability to participate in one of more activities of daily living.  The use of a RW will significantly improve the patient's ability to participate in MRADLS and the patient will use it on regular basis in the home.    Rehab Prognosis:  Good; patient would benefit from acute skilled OT services to address these deficits and reach maximum level of function.       Plan:     Patient to be seen 6 x/week to address the above listed problems via self-care/home  management, therapeutic activities, therapeutic exercises, neuromuscular re-education  Plan of Care Expires: 07/23/25  Plan of Care Reviewed with: patient, spouse    Subjective     Pain/Comfort:  Pain Rating 1: 0/10    Objective:     Communicated with: nurse prior to session.  Patient found up in chair with peripheral IV, telemetry upon OT entry to room.    General Precautions: Standard, fall    Orthopedic Precautions:N/A  Braces: N/A  Respiratory Status: Room air     Occupational Performance:     Activities of Daily Living:  Feeding:  modified independence feeding self with built-up handle for spoon  Lower Body Dressing: OT demonstrated use of reacher and sockaid for socks and underwear; spouse present and educated on this also    Walker for home use delivered in room by CM. OT adjusted to appropriate height.     OT also recommended patient remove all loose rugs and add motion/night lights in her home to decrease the risk of falling.     Patient Education:  Patient provided with verbal education education regarding OT role/goals/POC, fall prevention, safety awareness, Discharge/DME recommendations, and pressure ulcer prevention.  Understanding was verbalized, however additional teaching warranted.      Patient left up in chair with all lines intact, call button in reach, nurse notified, and spouse present.    GOALS:   Multidisciplinary Problems       Occupational Therapy Goals          Problem: Occupational Therapy    Goal Priority Disciplines Outcome Interventions   Occupational Therapy Goal     OT, PT/OT Progressing    Description: LTG: Pt will perform basic ADLs and ADL transfers with Modified independence using LRAD by discharge.    STG: to be met by 7/6    Pt will complete grooming standing at sink with LRAD with SBA.  Pt will complete UB dressing with SBA.  Pt will complete LB dressing with SBA using LRAD.  Pt will complete toileting with SBA using LRAD.  Pt will complete functional mobility to/from toilet  and toilet transfer with SBA using LRAD.                         Time Tracking:     OT Date of Treatment: 06/26/25  OT Start Time: 1336  OT Stop Time: 1400  OT Total Time (min): 24 min    Billable Minutes:Self Care/Home Management 24    OT/CARLA: OT     Number of CARLA visits since last OT visit: 2    6/26/2025

## 2025-06-26 NOTE — CONSULTS
Inpatient consult to Palliative Care  Consult performed by: Latasha Jordan FNP  Consult ordered by: Jaimee Maher MD      Patient Name: Alondra Snyder   MRN: [id number]   Admission Date: 6/21/2025   Hospital Length of Stay: 5   Attending Provider: Jaimee Maher MD   Consulting Provider: Latasha MOREAU  Reason for Consult: Goals of Care  Primary Care Physician: Jaimee Maher MD     Principal Problem: Rectal cancer     Patient information was obtained from patient, relative(s), and ER records.      Final diagnoses:  [R53.1] Weakness  [D72.829] Leukocytosis, unspecified type (Primary)  [A41.9] Sepsis, due to unspecified organism, unspecified whether acute organ dysfunction present  [E86.0] Dehydration  [E87.6] Hypokalemia  [E83.42] Hypomagnesemia  [N39.0] Urinary tract infection without hematuria, site unspecified     Assessment/Plan:     I reviewed the patient and family's understanding of the seriousness of the illness and its expected prognosis. We discussed the patient's goals of care and treatment preferences.  I clarified current code status. I identified the surrogate decision maker or health care POA.  I answered all questions and we formulated a plan including recommendations for symptom management and how to best achieve goals of care.  Advance Care Planning     Date: 06/26/2025    [facility]  I engaged the patient and family in a voluntary conversation about advance care planning and we specifically addressed what the goals of care would be moving forward, in light of the patient's change in clinical status, specifically current condition.  We did specifically address the patient's likely prognosis, which is fair .  We explored the patient's values and preferences for future care.  The patient and family endorses that what is most important right now is to focus on curative/life-prolongation (regardless of treatment burdens)    Accordingly, we have decided that the best plan to  meet the patient's goals includes continuing with treatment    This discussion occurred on a fully voluntary basis with the verbal consent of the patient and/or family.          Met with patient and spouse--introduced service and discussed patient's history and current condition.  Patient states she was initially diagnosed with colorectal cancer for which she received resection in 2010.  States that she was having routine lab work for surveillance when elevated CEA was noted and further study revealed return of cancer with Mets to liver lung and now brain.  Informed that she is followed by Dr. Hernandez and recently underwent Y90 ablation.      Patient and family informed that they requested palliative care evaluation because they are seeking to obtain further support during patient's treatment.  Patient's spouse expressed that it is frustrating for patient have to present to the ED when she is feeling ill to which I informed that as long as she is seeking treatment this may be necessary to evaluate if patient needs fluids or labs or hospitalization.  Discussed outpatient palliative nurse practitioner program in the clinic or home setting.  Discussed the difference between palliative and hospice care.   Patient agreeable to clinic referral and will notify whether she feels she can come to clinic or would like referral to home-based program.    Discuss advanced directives to which patient and wife informed that they had completed power-of- and living will many years ago and understand the importance and benefit of documents.  I informed that I would send order to CM and notify office of possible referral.  Offered support and encouraged to call for any questions or concerns.           History of Present Illness:     Patient is a 59 yo female with PMH of colorectal cancer s/p resection in 2010 with recurrence with metastasis to lung and liver for which patient recently underwent embolization procedure with IR who  presented to this facility after experiencing persistent weakness since procedure.  Patient was admitted to her hospital with labs revealing mild dehydration hypokalemia for which she received IV fluids and was discharged home.  However she presented to the ED again with similar complaints and was hospitalized with placement of Samuels for urinary retention.  Patient was noted on 06/23 to have significant right-sided weakness for which CTA ruled out acute CVA but confirmed brain metastasis for which Oncology and Radiation Oncology ordered with plans for outpatient XRT.  Palliative Medicine consulted for discussion of goals of care.       Active Ambulatory Problems     Diagnosis Date Noted    Wellness examination 07/06/2023    Anxiety disorder, unspecified     Mixed hyperlipidemia 01/08/2024    History of malignant carcinoid tumor of rectum 01/08/2024    Secondary malignancy of left lung 07/03/2024    Malignant neoplasm metastatic to right lung 07/29/2024    Secondary malignant neoplasm of liver 08/12/2024    Rectal cancer 08/12/2024    Drug-induced immunodeficiency 09/18/2024     Resolved Ambulatory Problems     Diagnosis Date Noted    No Resolved Ambulatory Problems     Past Medical History:   Diagnosis Date    Carpal tunnel syndrome     Malignant neoplasm of rectum     Rectal carcinoma         Past Surgical History:   Procedure Laterality Date    ADENOIDECTOMY  1968    BREAST SURGERY  2010    benign cyst removed    COLON RESECTION      COLON SURGERY  2015    COLONOSCOPY      HYSTERECTOMY  1993    only had one ovary removed, due to endometriosis    removal of right ovary Right     SINUS SURGERY      TONSILLECTOMY  1968        Review of patient's allergies indicates:  No Known Allergies     Current Medications[1]       Current Facility-Administered Medications:     acetaminophen, 650 mg, Oral, Q8H PRN    hydrocodone-chlorpheniramine, 5 mL, Oral, Q12H PRN    melatonin, 6 mg, Oral, Nightly PRN    ondansetron, 4 mg,  "Intravenous, Q8H PRN    sodium chloride 0.9%, 10 mL, Intravenous, PRN    traMADoL, 50 mg, Oral, Q8H PRN     Family History   Problem Relation Name Age of Onset    Cancer Mother Clare Snyder         renal    Early death Father Frank Snyder     Cirrhosis Father Frank Snyder     Diabetes Brother Jaiden Snyder     Diabetes Brother Wilbur Snyder         Review of Systems   Constitutional:  Positive for fatigue.   Neurological:  Positive for weakness.            Objective:   /76   Pulse 66   Temp 97.6 °F (36.4 °C) (Oral)   Resp 18   Ht 5' 7" (1.702 m)   Wt 79.4 kg (175 lb)   SpO2 95%   BMI 27.41 kg/m²      Physical Exam  Constitutional:       Appearance: She is ill-appearing.   HENT:      Head: Normocephalic.   Eyes:      Pupils: Pupils are equal, round, and reactive to light.   Cardiovascular:      Rate and Rhythm: Normal rate.   Pulmonary:      Effort: Pulmonary effort is normal.   Abdominal:      Palpations: Abdomen is soft.   Neurological:      Mental Status: She is oriented to person, place, and time.             Review of Symptoms      Symptom Assessment (ESAS 0-10 Scale)  Pain:  0  Dyspnea:  0  Anxiety:  0  Nausea:  0  Depression:  0  Anorexia:  0  Fatigue:  0  Insomnia:  0  Restlessness:  0  Agitation:  0         Bowel Management Plan (BMP):  Yes      Psychosocial/Cultural:   See Palliative Psychosocial Note: Yes  Patient is  with 2 adult children and is retired .   **Primary  to Follow**  Palliative Care  Consult: No      Advance Care Planning   Advance Directives:     Decision Making:  Family answered questions and Patient answered questions  Goals of Care: The patient and family endorses that what is most important right now is to focus on curative/life-prolongation (regardless of treatment burdens)    Accordingly, we have decided that the best plan to meet the patient's goals includes continuing with treatment          PAINAD: NA    Caregiver " burden formerly assessed: Yes        > 50% of 70 min of encounter was spent in chart review, face to face discussion of goals of care, symptom assessment, coordination of care and emotional support.       Latasha Jordan FNP, [facility]  Palliative Medicine  [facility]           [1]   Current Facility-Administered Medications:     0.9% NaCl infusion, , Intravenous, Continuous, Ricardo Hernandez MD, Last Rate: 75 mL/hr at 06/26/25 1104, New Bag at 06/26/25 1104    acetaminophen tablet 650 mg, 650 mg, Oral, Q8H PRN, Cris Saavedra MD, 650 mg at 06/22/25 1858    dexAMETHasone injection 4 mg, 4 mg, Intravenous, Q8H, Ricardo Hernandez MD, 4 mg at 06/26/25 1402    famotidine tablet 20 mg, 20 mg, Oral, BID, Cris Saavedra MD, 20 mg at 06/26/25 0900    hydrocodone-chlorpheniramine 10-8 mg/5 mL suspension 5 mL, 5 mL, Oral, Q12H PRN, Jaimee Maher MD, 5 mL at 06/21/25 2145    melatonin tablet 6 mg, 6 mg, Oral, Nightly PRN, Cris Saavedra MD    mupirocin 2 % ointment, , Nasal, BID, Jaimee Maher MD, Given at 06/26/25 0901    ondansetron injection 4 mg, 4 mg, Intravenous, Q8H PRN, Cris Saavedra MD    pantoprazole EC tablet 40 mg, 40 mg, Oral, Daily, Jaimee Maher MD, 40 mg at 06/26/25 0900    polyethylene glycol packet 17 g, 17 g, Oral, Daily, Cris Saavedra MD, 17 g at 06/23/25 0940    potassium chloride CR tablet 10 mEq, 10 mEq, Oral, BID, Ricardo Schultz MD, 10 mEq at 06/26/25 0900    sertraline tablet 100 mg, 100 mg, Oral, QHS, Ricardo Schultz MD, 100 mg at 06/25/25 2109    sodium chloride 0.9% flush 10 mL, 10 mL, Intravenous, PRN, Cris Saavedra MD    sucralfate tablet 1 g, 1 g, Oral, QID (AC & HS), Ricardo Schultz MD, 1 g at 06/26/25 1104    traMADoL tablet 50 mg, 50 mg, Oral, Q8H PRN, Ricardo Schultz MD     Unable to assess

## 2025-06-26 NOTE — PLAN OF CARE
Rw approved and delivered to the bedside  [facility] will accept pt   They have a wc at home

## 2025-06-26 NOTE — PT/OT/SLP PROGRESS
Physical Therapy Treatment    Patient Name:  Alondra Snyder   MRN:  [id number]    Recommendations:     Discharge therapy intensity: Low Intensity Therapy   Discharge Equipment Recommendations: walker, rolling  Barriers to discharge: None    Assessment:     Alondra Snyder is a 60 y.o. female admitted with a medical diagnosis of  severe fatigue/weakness, hx of metastatic colon CA with lung/liver mets, acute urinary retention, recent Y-90 embolization with acute inflammatory response.  She presents with the following impairments/functional limitations: weakness, impaired endurance, impaired self care skills, impaired functional mobility, gait instability, impaired balance, decreased upper extremity function, decreased lower extremity function . Pt continues to improve with functional mobility, able to ambulate on level surfaces with CGA using RW and required Cassy for step negotiation. Practiced safe techniques and recommended assist level for step negotiation; pt's wife not present this AM for training however pt very receptive and demo'd good understanding/carryover.    Rehab Prognosis: Good; patient would benefit from acute skilled PT services to address these deficits and reach maximum level of function.    Recent Surgery: * No surgery found *      Plan:     During this hospitalization, patient would benefit from acute PT services BID to address the identified rehab impairments via gait training, therapeutic activities, therapeutic exercises, neuromuscular re-education and progress toward the following goals:    Plan of Care Expires:  07/23/25    Subjective     Chief Complaint: none  Patient/Family Comments/goals: PLOF  Pain/Comfort:  Pain Rating 1: 0/10      Objective:     Communicated with RN prior to session.  Patient found up in chair with peripheral IV, alarcon catheter, pulse ox (continuous), telemetry upon PT entry to room.     General Precautions: Standard, fall  Orthopedic Precautions:    Braces:     Respiratory Status: Room air  Blood Pressure: NT  Skin Integrity: Visible skin intact      Functional Mobility:  Transfers:     Sit to Stand:  contact guard assistance with rolling walker  Gait: 80ft with RW with CGA, no buckling or foot drag noted on RLE. Cues to remain within RW with turning.  Balance: static standing balance = CGA with RW, mild R lean  Stairs:  Pt ascended/descended 3 stair(s) with No Assistive Device with left handrail with Minimal Assistance.     Therapeutic Activities/Exercises:  2 trials of 3 steps with Cassy, LHR and HHA on opposite UE for balance. Cued to ascend with LLE and descend with RLE leading to compensate for Rle weakness.    Co-Treatment: No    Education:  Patient provided with verbal education education regarding PT role/goals/POC, fall prevention, safety awareness, and discharge/DME recommendations.  Understanding was verbalized.     Patient left up in chair with all lines intact, call button in reach, geomat cushion, RN notified, and visitors present    DME Justification:   Alondra's mobility limitation cannot be sufficiently resolved by the use of a cane. Her functional mobility deficit can be sufficiently resolved with the use of a Walker. Patient's mobility limitation significantly impairs their ability to participate in one of more activities of daily living.  The use of a Walker will significantly improve the patient's ability to participate in MRADLS and the patient will use it on regular basis in the home.    GOALS:   Multidisciplinary Problems       Physical Therapy Goals          Problem: Physical Therapy    Goal Priority Disciplines Outcome Interventions   Physical Therapy Goal     PT, PT/OT Progressing    Description: Goals to be met by: 25     Patient will increase functional independence with mobility by performin. Supine to sit with [place]  2. Sit to stand transfer with Modified [place]  3. Bed to chair transfer with Modified [place]  using Rolling Walker  4. Gait  x 150 feet with Modified [place] using Rolling Walker.   5. Ascend/descend 5 stair with left Handrails Minimal Assistance using No Assistive Device.                          Time Tracking:     PT Received On: 06/26/25  PT Start Time: 1018     PT Stop Time: 1044  PT Total Time (min): 26 min     Billable Minutes: Gait Training 26 min    Treatment Type: Treatment  PT/PTA: PT     Number of PTA visits since last PT visit: 4     06/26/2025

## 2025-06-26 NOTE — TELEPHONE ENCOUNTER
Copied from CRM #[id number]. Topic: General Inquiry - Patient Update  >> Jun 26, 2025  2:40 PM Sg Tapia wrote:  Who Called: Zulma shelby spouse    Caller is requesting assistance/information from provider's office.    Symptoms (please be specific): N/A   How long has patient had these symptoms:  N/A  List of preferred pharmacies on file (remove unneeded): [unfilled]  If different, enter pharmacy into here including location and phone number: N/A      Preferred Method of Contact: Phone Call  Patient's Preferred Phone Number on File: [phone]   Best Call Back Number, if different:[phone]  Additional Information: Zulma called to say disregard the last message, they're going home today

## 2025-06-26 NOTE — TELEPHONE ENCOUNTER
Copied from CRM #[id number]. Topic: General Inquiry - Patient Advice  >> Jun 26, 2025  1:41 PM Erin wrote:  .Who Called: Zulma(partner)    Patient is returning phone call    Who Left Message for Patient:  Does the patient know what this is regarding?:Spouse requesting callback from  stated Pt would like another day in hospital. Told they were leaving today but need one more day       Preferred Method of Contact: Phone Call  Patient's Preferred Phone Number on File: [phone]   Best Call Back Number, if different: [phone]  Additional Information:

## 2025-06-27 ENCOUNTER — TELEPHONE (OUTPATIENT)
Dept: INTERNAL MEDICINE | Facility: CLINIC | Age: 60
End: 2025-06-27
Payer: COMMERCIAL

## 2025-06-27 VITALS
HEART RATE: 74 BPM | DIASTOLIC BLOOD PRESSURE: 79 MMHG | OXYGEN SATURATION: 95 % | HEIGHT: 67 IN | TEMPERATURE: 98 F | WEIGHT: 175 LBS | SYSTOLIC BLOOD PRESSURE: 127 MMHG | RESPIRATION RATE: 18 BRPM | BODY MASS INDEX: 27.47 KG/M2

## 2025-06-27 PROCEDURE — G0180 MD CERTIFICATION HHA PATIENT: HCPCS | Mod: ,,, | Performed by: INTERNAL MEDICINE

## 2025-06-27 PROCEDURE — 97530 THERAPEUTIC ACTIVITIES: CPT

## 2025-06-27 PROCEDURE — 25000003 PHARM REV CODE 250: Performed by: INTERNAL MEDICINE

## 2025-06-27 PROCEDURE — 25000003 PHARM REV CODE 250

## 2025-06-27 PROCEDURE — 25000003 PHARM REV CODE 250: Performed by: EMERGENCY MEDICINE

## 2025-06-27 PROCEDURE — 63600175 PHARM REV CODE 636 W HCPCS: Performed by: INTERNAL MEDICINE

## 2025-06-27 PROCEDURE — 97110 THERAPEUTIC EXERCISES: CPT

## 2025-06-27 RX ADMIN — FAMOTIDINE 20 MG: 20 TABLET, FILM COATED ORAL at 08:06

## 2025-06-27 RX ADMIN — DEXAMETHASONE SODIUM PHOSPHATE 4 MG: 4 INJECTION, SOLUTION INTRA-ARTICULAR; INTRALESIONAL; INTRAMUSCULAR; INTRAVENOUS; SOFT TISSUE at 06:06

## 2025-06-27 RX ADMIN — POTASSIUM CHLORIDE 10 MEQ: 750 TABLET, FILM COATED, EXTENDED RELEASE ORAL at 08:06

## 2025-06-27 RX ADMIN — SUCRALFATE 1 G: 1 TABLET ORAL at 06:06

## 2025-06-27 RX ADMIN — PANTOPRAZOLE SODIUM 40 MG: 40 TABLET, DELAYED RELEASE ORAL at 08:06

## 2025-06-27 NOTE — PLAN OF CARE
CLARK GuerrierS sent to      Pt wife came to station and would like to continue with plan of going home with HH

## 2025-06-27 NOTE — NURSING
Patient Discharged to Home with . Patient and spouse educated on continued care at home. Verbalized understanding. Vital signs stable. IV removed and telemetry removed.

## 2025-06-27 NOTE — PLAN OF CARE
06/27/25 1135   Final Note   Assessment Type Final Discharge Note   Anticipated Discharge Disposition Home-Health   Post-Acute Status   Post-Acute Authorization Home Health   Home Health Status Set-up Complete/Auth obtained   Discharge Delays None known at this time     Pt is dc to home with [facility]   alex BARBOZA info sent to [facility]h [facility] notified of consult to follow up with pt  RW provided and delivered to bedside 6-26       Pt alert and oriented. Respirations are even and unlabored on room air. Reports pain L buttock 6 /10. Medications administered as ordered. Pt tolerated meds well. IV fluids continued @ 100 ml/hr. Strict I & O as ordered. PRN Hydralazine and Zofran administered. Wound care performed to L buttock as ordered. Pt remains free from falls or injury throughout shift. Will continue to monitor.      Problem: Infection  Goal: Infection Symptom Resolution  Outcome: Ongoing, Progressing     Problem: Renal Function Impairment (Acute Kidney Injury/Impairment)  Goal: Effective Renal Function  Outcome: Ongoing, Progressing     Problem: Fluid Imbalance (Acute Kidney Injury/Impairment)  Goal: Optimal Fluid Balance  Outcome: Ongoing, Progressing     Problem: Glycemic Control Impaired (Sepsis/Septic Shock)  Goal: Blood Glucose Level Within Desired Range  Outcome: Ongoing, Progressing     Problem: Diabetes Comorbidity  Goal: Blood Glucose Level Within Desired Range  Outcome: Ongoing, Progressing     Problem: Fall Injury Risk  Goal: Absence of Fall and Fall-Related Injury  Outcome: Ongoing, Progressing

## 2025-06-27 NOTE — TELEPHONE ENCOUNTER
Copied from CRM #[id number]. Topic: General Inquiry - Return Call  >> Jun 27, 2025 11:43 AM Niya wrote:  Who Called: Alondra Snyder    Patient is returning phone call    Who Left Message for Patient:  Does the patient know what this is regarding?:      Patient's Preferred Phone Number on File:    Best Call Back Number, if different:[phone]    Additional Information: returned call to schedule HFU appt

## 2025-06-27 NOTE — TELEPHONE ENCOUNTER
Copied from CRM #[id number]. Topic: General Inquiry - Patient Advice  >> Jun 27, 2025 10:45 AM Vero wrote:  .Who Called: Rosemarie LAMAS    Caller is requesting assistance/information from provider's office.    Symptoms (please be specific): N/A   How long has patient had these symptoms:  N/A  List of preferred pharmacies on file (remove unneeded): [unfilled]  If different, enter pharmacy into here including location and phone number: N/A    Preferred Method of Contact: Phone Call    Patient's Preferred Phone Number on File: [phone]     Best Call Back Number, if different: [phone] (Zulma/ wife)    Additional Information: Called stating pt will be d/c today and is needing a 1-2 week hospital f/u appointment. Please contact pt or wife. Please advise, thank you.

## 2025-06-30 ENCOUNTER — TELEPHONE (OUTPATIENT)
Dept: INTERNAL MEDICINE | Facility: CLINIC | Age: 60
End: 2025-06-30
Payer: COMMERCIAL

## 2025-06-30 ENCOUNTER — PATIENT OUTREACH (OUTPATIENT)
Dept: ADMINISTRATIVE | Facility: CLINIC | Age: 60
End: 2025-06-30
Payer: COMMERCIAL

## 2025-06-30 NOTE — TELEPHONE ENCOUNTER
Copied from CRM #[id number]. Topic: General Inquiry - Patient Advice  >> Jun 27, 2025  2:09 PM Erin wrote:  .Who Called:Zulma     Patient is returning phone call    Who Left Message for Patient:  Does the patient know what this is regarding?:Zulma returning call from office      Preferred Method of Contact: Phone Call  Patient's Preferred Phone Number on File: [phone]   Best Call Back Number, if different: [phone]  Additional Information:

## 2025-06-30 NOTE — TELEPHONE ENCOUNTER
Spoke with pt's spouse regarding scheduling pt for a hospital f/u, assisted pt's spouse in scheduling hospital f/u

## 2025-06-30 NOTE — PROGRESS NOTES
Transitional Care Note  Subjective:   Patient ID: Alondra Snyder is a 60 y.o. female.    Chief Complaint: Follow-up (Hospital f/u DC 6-26-25)      Date of Hospital Discharge: 6/27/25   Family and/or Caretaker present at visit?  Yes  Diagnostic tests reviewed/disposition: I have reviewed all completed as well as pending diagnostic tests at the time of discharge.  Disease/illness education: performed  Home health/community services discussion/referrals: .   Establishment or re-establishment of referral orders for community resources: No other necessary community resources.   Discussion with other health care providers: No discussion with other health care providers necessary.     HPI: Elvi is a 60-year-old female known to me with colorectal cancer with metastasis to the lung in the liver. Recently underwent an embolization procedure with Interventional Radiology. The 1st procedure about a month ago and then a more recent 1 about 2 weeks ago. Since the procedure patient has had increasing weakness to the point where she is unable to ambulate. Was admitted to the hospital with labs showing mild dehydration and hypokalemia. Patient received IV fluids with some transient improvement on an outpatient basis however presented to the ER again with progressive weakness and subjective fever. She was pancultured and started on IV antibiotics. A Samuels catheter was placed because of urinary retention.  Secondary to patient's right-sided weakness, CTA was ordered to rule out acute CVA.  This then confirmed a 2.1 cm left posterior frontal lobe new metastasis.    Patient was initiated on IV dexamethasone and started to feel some improvement.  Radiation Oncology was consulted and patient being prepped with MRI and treatment planning for starting XRT.    She continued to work with Physical that people in-house.    Patient was discharged home with home health and follow up appointment with radiation oncology.  She is scheduled for  3 XRT he has and has completed 1 and scheduled for her 2nd later this afternoon.  Overall tolerated it well and no acute needs or complaints.  Able to ambulate with the help of a walker.  On dexamethasone 4 mg q.6 and will be continued.      Plan of care has been discussed with the patient as well as her wife who is present at the visit and all the questions have been addressed and answered        Health maintenance reviewed with the patient.  Health maintenance completed:  Health Maintenance Topics with due status: Not Due       Topic Last Completion Date    Influenza Vaccine 10/13/2022    Colorectal Cancer Screening 11/08/2022    Hemoglobin A1c (Diabetic Prevention Screening) 01/04/2024    Lipid Panel 01/04/2024      Health maintenance due:  Health Maintenance Due   Topic Date Due    Hepatitis C Screening  Never done    TETANUS VACCINE  Never done    Shingles Vaccine (1 of 2) Never done    Pneumococcal Vaccines (Age 50+) (1 of 2 - PCV) Never done    COVID-19 Vaccine (3 - Moderna risk series) 01/18/2022    RSV Vaccine (Age 60+ and Pregnant patients) (1 - Risk 60-74 years 1-dose series) Never done    Mammogram  02/01/2025      Review of Systems    A comprehensive review of systems is obtained and is essentially negative except for that stated in the HPI   History:     Past Medical History:   Diagnosis Date    Anxiety disorder, unspecified     Carpal tunnel syndrome     Malignant neoplasm of rectum     Rectal carcinoma       Past Surgical History:   Procedure Laterality Date    ADENOIDECTOMY  1968    BREAST SURGERY  2010    benign cyst removed    COLON RESECTION      COLON SURGERY  2015    COLONOSCOPY      HYSTERECTOMY  1993    only had one ovary removed, due to endometriosis    removal of right ovary Right     SINUS SURGERY      TONSILLECTOMY  1968     Family History   Problem Relation Name Age of Onset    Cancer Mother Clare Lillian         renal    Early death Father Frank Snyder     Cirrhosis Father Frank Snyder      "Diabetes Brother Jaiden Snyder     Diabetes Brother Wilbur Snyder       Social History     Tobacco Use    Smoking status: Never    Smokeless tobacco: Never   Substance and Sexual Activity    Alcohol use: Not Currently     Alcohol/week: 2.0 standard drinks of alcohol    Drug use: Never    Sexual activity: Not Currently     Partners: Female      Smoking Cessation:  Counseling given: No     Non-smoker    Allergies: Review of patient's allergies indicates:  No Known Allergies  Objective:     Vitals:    07/03/25 1002   BP: 102/76   Pulse: 90   SpO2: 96%   Weight: 78.9 kg (174 lb)   Height: 5' 7" (1.702 m)   PainSc: 0-No pain     Body mass index is 27.25 kg/m².   Wt Readings from Last 4 Encounters:   07/03/25 78.9 kg (174 lb)   06/21/25 79.4 kg (175 lb)   06/19/25 77 kg (169 lb 12.1 oz)   06/10/25 77.3 kg (170 lb 6.7 oz)     Weight change: has been stable.    Physical Examination:   Physical Exam  Constitutional:       Appearance: Normal appearance.   HENT:      Head: Normocephalic and atraumatic.      Nose: Nose normal.      Mouth/Throat:      Mouth: Mucous membranes are moist.      Pharynx: Oropharynx is clear.   Eyes:      Extraocular Movements: Extraocular movements intact.      Pupils: Pupils are equal, round, and reactive to light.   Cardiovascular:      Rate and Rhythm: Normal rate and regular rhythm.      Pulses: Normal pulses.   Pulmonary:      Effort: Pulmonary effort is normal.      Breath sounds: Normal breath sounds.   Abdominal:      General: Bowel sounds are normal.      Palpations: Abdomen is soft.   Musculoskeletal:         General: Normal range of motion.      Cervical back: Normal range of motion and neck supple.      Comments: Generalized weakness, ambulating with the help of a walker   Skin:     General: Skin is warm.   Neurological:      General: No focal deficit present.      Mental Status: She is alert and oriented to person, place, and time. Mental status is at baseline.   Psychiatric:         Mood " and Affect: Mood normal.         Diagnostic Tests:  Significant Imaging: I have reviewed and interpreted all pertinent imaging results/findings.  MRI Brain Stealth  Narrative: EXAMINATION:  Limited axial MRI images of the brain are performed with contrast according to the stealth protocol.  Since this is not a diagnostic examination, there is no professional interpretation charge rendered.  Impression: As above.    Electronically signed by: Agustín Montano  Date:    06/24/2025  Time:    18:14      Labs:   Lab Results   Component Value Date    WBC 18.04 (H) 06/24/2025    RBC 3.42 (L) 06/24/2025    HGB 8.9 (L) 06/24/2025    HCT 28.0 (L) 06/24/2025    MCV 81.9 06/24/2025    MCH 26.0 (L) 06/24/2025     (H) 06/24/2025     (H) 06/24/2025     06/24/2025    K 3.7 06/24/2025     06/24/2025    BUN 9.3 (L) 06/24/2025    CREATININE 0.49 (L) 06/24/2025    AST 53 (H) 06/24/2025    ALT 16 06/24/2025    BILITOT 1.6 (H) 06/24/2025    TSH 1.793 01/04/2024    HGBA1C 5.7 01/04/2024        Laboratory Data:  All pertinent labs have been reviewed.  I have reviewed the following records today:     Details:   [x] Labs [] Internal  [] External    [] Micro [] Internal  [] External    [] Pathology [] Internal  [] External    [x] Imaging [] Internal  [] External    [x] Cardiology Procedures [] Internal  [] External    [x] Provider Records [] Internal  [] External    [] Other [] Internal  [] External      Medications:     Medication List with Changes/Refills   Current Medications    DEXAMETHASONE (DECADRON) 4 MG TAB    Take 1 tablet (4 mg total) by mouth every 8 (eight) hours.    HYDROCODONE-CHLORPHENIRAMINE (TUSSIONEX) 10-8 MG/5 ML SUSPENSION    Take 5 mLs by mouth every 12 (twelve) hours as needed for Cough.    LIDOCAINE-PRILOCAINE (EMLA) CREAM    Apply to [facility] site approximately 30 minutes prior to access.    METOCLOPRAMIDE HCL (REGLAN) 10 MG TABLET    Take 1 tablet (10 mg total) by mouth 4 (four) times daily before  meals and nightly.    OMEPRAZOLE (PRILOSEC) 40 MG CAPSULE    Take 1 capsule (40 mg total) by mouth once daily.    SERTRALINE (ZOLOFT) 100 MG TABLET    Take 100 mg by mouth once daily.    SUCRALFATE (CARAFATE) 1 GRAM TABLET    Take 1 tablet (1 g total) by mouth 4 (four) times daily before meals and nightly.    TRAMADOL (ULTRAM) 50 MG TABLET    Take 1 tablet (50 mg total) by mouth every 8 (eight) hours as needed for Pain.     Assessment:     1. Rectal cancer    2. Hospital discharge follow-up    3. Generalized anxiety disorder      Plan:     Problem List Items Addressed This Visit          Psychiatric    Anxiety disorder, unspecified    Current Assessment & Plan   -situational, overall well controlled   -on Zoloft 100 mg p.o. daily, continue            Oncology    Rectal cancer - Primary    Current Assessment & Plan   With metastasis to the lung, liver as well as now to the brain   -currently on dexamethasone 4 mg q.6 hours which will be continued   -also actively getting XRT and has 3 sessions schedule, going for 2 of 3 today.    -we will continue follow up with Oncology            [Secondary malignancy of left lung]    [Secondary malignant neoplasm of liver]       Other    Hospital discharge follow-up    Current Assessment & Plan   -plan of care has been reviewed in detail with the patient as well as her wife, continue with dexamethasone for now   -will continue with radiation and medical oncology follow-ups   -advised to call with any acute issues or worsening symptoms             1.  Continue current medications  2.  Side effects and expected results discussed  3.  Diet and exercise as tolerated  4.  Nutritional support  5.  Health maintenance updated accordingly  6.  Call with increased complaints or concerns  7.  Plan of care has been discussed at length with the patient as well as her wife was present at the visit today.  Patient able to ambulate with the help of a walker, going for her 2nd XRT today and has 1  more planned for 07/07/2025.    Currently remains on dexamethasone every 4 hours at 4 mg p.o. daily  No other acute needs or concerns    Follow Up:   No follow-ups on file.    I spent greater than 40 minutes today both in chart review and greater than 50% of that time in discussion with the patient regarding health maintenance, diagnoses, diagnostic tests, medications, treatments, symptom management, expected results and adverse effects. Patient verbalized understanding and all questions were answered.    This note includes dictation done on [employer] word recognition program.  There are word recognition mistakes that are occasionally missed on review.

## 2025-06-30 NOTE — TELEPHONE ENCOUNTER
It looks as though messages were left to get Pt scheduled for a hospital f/u  It looks as though we were able to schedule Pt for 7-3-25. LVM informing Ms Zulma

## 2025-07-01 ENCOUNTER — CLINICAL SUPPORT (OUTPATIENT)
Dept: RADIATION THERAPY | Facility: HOSPITAL | Age: 60
End: 2025-07-01
Attending: RADIOLOGY
Payer: COMMERCIAL

## 2025-07-01 PROCEDURE — 77373 STRTCTC BDY RAD THER TX DLVR: CPT | Performed by: RADIOLOGY

## 2025-07-03 ENCOUNTER — OFFICE VISIT (OUTPATIENT)
Dept: INTERNAL MEDICINE | Facility: CLINIC | Age: 60
End: 2025-07-03
Payer: COMMERCIAL

## 2025-07-03 VITALS
DIASTOLIC BLOOD PRESSURE: 76 MMHG | WEIGHT: 174 LBS | HEART RATE: 90 BPM | SYSTOLIC BLOOD PRESSURE: 102 MMHG | OXYGEN SATURATION: 96 % | HEIGHT: 67 IN | BODY MASS INDEX: 27.31 KG/M2

## 2025-07-03 DIAGNOSIS — F41.1 GENERALIZED ANXIETY DISORDER: ICD-10-CM

## 2025-07-03 DIAGNOSIS — Z09 HOSPITAL DISCHARGE FOLLOW-UP: ICD-10-CM

## 2025-07-03 DIAGNOSIS — C20 RECTAL CANCER: Primary | ICD-10-CM

## 2025-07-03 PROCEDURE — 77373 STRTCTC BDY RAD THER TX DLVR: CPT | Performed by: RADIOLOGY

## 2025-07-03 NOTE — ASSESSMENT & PLAN NOTE
-plan of care has been reviewed in detail with the patient as well as her wife, continue with dexamethasone for now   -will continue with radiation and medical oncology follow-ups   -advised to call with any acute issues or worsening symptoms

## 2025-07-03 NOTE — ASSESSMENT & PLAN NOTE
With metastasis to the lung, liver as well as now to the brain   -currently on dexamethasone 4 mg q.6 hours which will be continued   -also actively getting XRT and has 3 sessions schedule, going for 2 of 3 today.    -we will continue follow up with Oncology

## 2025-07-07 ENCOUNTER — OFFICE VISIT (OUTPATIENT)
Dept: HEMATOLOGY/ONCOLOGY | Facility: CLINIC | Age: 60
End: 2025-07-07
Payer: COMMERCIAL

## 2025-07-07 VITALS
HEIGHT: 67 IN | BODY MASS INDEX: 26.29 KG/M2 | DIASTOLIC BLOOD PRESSURE: 70 MMHG | HEART RATE: 89 BPM | WEIGHT: 167.5 LBS | OXYGEN SATURATION: 96 % | TEMPERATURE: 98 F | SYSTOLIC BLOOD PRESSURE: 104 MMHG | RESPIRATION RATE: 18 BRPM

## 2025-07-07 DIAGNOSIS — C78.01 MALIGNANT NEOPLASM METASTATIC TO RIGHT LUNG: ICD-10-CM

## 2025-07-07 DIAGNOSIS — C78.02 MALIGNANT NEOPLASM METASTATIC TO LEFT LUNG: ICD-10-CM

## 2025-07-07 DIAGNOSIS — C20 RECTAL CANCER: Primary | ICD-10-CM

## 2025-07-07 DIAGNOSIS — C79.31 SECONDARY MALIGNANT NEOPLASM OF BRAIN: ICD-10-CM

## 2025-07-07 DIAGNOSIS — C78.7 SECONDARY MALIGNANT NEOPLASM OF LIVER: ICD-10-CM

## 2025-07-07 LAB
ALBUMIN SERPL-MCNC: 2.2 G/DL (ref 3.4–4.8)
ALBUMIN/GLOB SERPL: 0.5 RATIO (ref 1.1–2)
ALP SERPL-CCNC: 1510 UNIT/L (ref 40–150)
ALT SERPL-CCNC: 60 UNIT/L (ref 0–55)
ANION GAP SERPL CALC-SCNC: 12 MEQ/L
AST SERPL-CCNC: 104 UNIT/L (ref 11–45)
BASOPHILS # BLD AUTO: 0.01 X10(3)/MCL
BASOPHILS NFR BLD AUTO: 0 %
BILIRUB SERPL-MCNC: 2.3 MG/DL
BUN SERPL-MCNC: 16.8 MG/DL (ref 9.8–20.1)
CALCIUM SERPL-MCNC: 9.1 MG/DL (ref 8.4–10.2)
CEA SERPL-MCNC: 112.27 NG/ML (ref 0–3)
CHLORIDE SERPL-SCNC: 96 MMOL/L (ref 98–107)
CO2 SERPL-SCNC: 24 MMOL/L (ref 23–31)
CREAT SERPL-MCNC: 0.51 MG/DL (ref 0.55–1.02)
CREAT/UREA NIT SERPL: 33
EOSINOPHIL # BLD AUTO: 0.01 X10(3)/MCL (ref 0–0.9)
EOSINOPHIL NFR BLD AUTO: 0 %
ERYTHROCYTE [DISTWIDTH] IN BLOOD BY AUTOMATED COUNT: 22.1 % (ref 11.5–17)
GFR SERPLBLD CREATININE-BSD FMLA CKD-EPI: >60 ML/MIN/1.73/M2
GLOBULIN SER-MCNC: 4.7 GM/DL (ref 2.4–3.5)
GLUCOSE SERPL-MCNC: 96 MG/DL (ref 82–115)
HCT VFR BLD AUTO: 32 % (ref 37–47)
HGB BLD-MCNC: 10 G/DL (ref 12–16)
IMM GRANULOCYTES # BLD AUTO: 0.54 X10(3)/MCL (ref 0–0.04)
IMM GRANULOCYTES NFR BLD AUTO: 2.1 %
LYMPHOCYTES # BLD AUTO: 0.39 X10(3)/MCL (ref 0.6–4.6)
LYMPHOCYTES NFR BLD AUTO: 1.5 %
MCH RBC QN AUTO: 26.6 PG (ref 27–31)
MCHC RBC AUTO-ENTMCNC: 31.3 G/DL (ref 33–36)
MCV RBC AUTO: 85.1 FL (ref 80–94)
MONOCYTES # BLD AUTO: 1.82 X10(3)/MCL (ref 0.1–1.3)
MONOCYTES NFR BLD AUTO: 6.9 %
NEUTROPHILS # BLD AUTO: 23.5 X10(3)/MCL (ref 2.1–9.2)
NEUTROPHILS NFR BLD AUTO: 89.5 %
PLATELET # BLD AUTO: 567 X10(3)/MCL (ref 130–400)
PMV BLD AUTO: 8.5 FL (ref 7.4–10.4)
POTASSIUM SERPL-SCNC: 4.3 MMOL/L (ref 3.5–5.1)
PROT SERPL-MCNC: 6.9 GM/DL (ref 5.8–7.6)
RBC # BLD AUTO: 3.76 X10(6)/MCL (ref 4.2–5.4)
SODIUM SERPL-SCNC: 132 MMOL/L (ref 136–145)
WBC # BLD AUTO: 26.27 X10(3)/MCL (ref 4.5–11.5)

## 2025-07-07 PROCEDURE — 77373 STRTCTC BDY RAD THER TX DLVR: CPT

## 2025-07-07 PROCEDURE — 99999 PR PBB SHADOW E&M-EST. PATIENT-LVL IV: CPT | Mod: PBBFAC,,, | Performed by: INTERNAL MEDICINE

## 2025-07-07 PROCEDURE — 80053 COMPREHEN METABOLIC PANEL: CPT | Performed by: INTERNAL MEDICINE

## 2025-07-07 PROCEDURE — 1111F DSCHRG MED/CURRENT MED MERGE: CPT | Mod: CPTII,S$GLB,, | Performed by: INTERNAL MEDICINE

## 2025-07-07 PROCEDURE — 36415 COLL VENOUS BLD VENIPUNCTURE: CPT | Performed by: INTERNAL MEDICINE

## 2025-07-07 PROCEDURE — 3078F DIAST BP <80 MM HG: CPT | Mod: CPTII,S$GLB,, | Performed by: INTERNAL MEDICINE

## 2025-07-07 PROCEDURE — 3074F SYST BP LT 130 MM HG: CPT | Mod: CPTII,S$GLB,, | Performed by: INTERNAL MEDICINE

## 2025-07-07 PROCEDURE — 99215 OFFICE O/P EST HI 40 MIN: CPT | Mod: S$GLB,,, | Performed by: INTERNAL MEDICINE

## 2025-07-07 PROCEDURE — 1159F MED LIST DOCD IN RCRD: CPT | Mod: CPTII,S$GLB,, | Performed by: INTERNAL MEDICINE

## 2025-07-07 PROCEDURE — 82378 CARCINOEMBRYONIC ANTIGEN: CPT | Performed by: INTERNAL MEDICINE

## 2025-07-07 PROCEDURE — 85025 COMPLETE CBC W/AUTO DIFF WBC: CPT | Performed by: INTERNAL MEDICINE

## 2025-07-07 PROCEDURE — 1160F RVW MEDS BY RX/DR IN RCRD: CPT | Mod: CPTII,S$GLB,, | Performed by: INTERNAL MEDICINE

## 2025-07-07 PROCEDURE — 3008F BODY MASS INDEX DOCD: CPT | Mod: CPTII,S$GLB,, | Performed by: INTERNAL MEDICINE

## 2025-07-07 PROCEDURE — 77336 RADIATION PHYSICS CONSULT: CPT

## 2025-07-07 NOTE — PROGRESS NOTES
Subjective:       Patient ID: Alondra Snyder is a 60 y.o. female.    Chief Complaint:  I finished radiation today    Diagnosis: Metastatic rectal cancer                    Stage IIIA rectal carcinoma 3/15 (T2 N1b M0); 3/28+ nodes     Treatment History  Oxaliplatin/Xeloda x6 completed 8/15  --> Xeloda/XRT completed 11/11/15  FOLFIRI + Bevacizumab x 10 (7/24-12/24)  FOLFOX + Bevacizumab x 4 (2/25-4/25)    Current Treatment:  SBRT L frontal lobe met     Clinical History: Patient was referred for a first time screening colonoscopy at the age of 49 by her gynecologist.  She had no abdominal symptoms or complaints, changes in her bowel habits, melena or hematochezia.  Colonoscopy revealed a malignant appearing polyp at the rectosigmoid junction.  Biopsy was positive for adenocarcinoma.  Preoperative CEA level was normal 2 ng/mL.  Chest x-ray was unremarkable.  CT A/P showed a large solid left adnexal mass measuring 4.9 x 6.1 cm.  There was no specific abnormality in the sigmoid colon or rectum and no evidence of metastatic disease.  Pelvic ultrasound confirmed that the lesion was a uterine fibroid.  She underwent a laparoscopic resection with primary reanastomosis 3/18/15.  Final pathology showed a 2 cm moderately differentiated adenocarcinoma invading into but not through the muscularis propria.  3 out of 28 lymph nodes were positive for metastatic involvement. All resection margins were clear.  At the time of surgery, the lesion was delineated to be in the upper rectum. She recovered from her surgery uneventfully.  She completed adjuvant chemotherapy and radiation as documented above. Her Mediport catheter was removed 10/17.    She was lost to follow-up from 9/19 until 6/21/23 due to the COVID-19 pandemic.  She reported no significant problems or health issues in the interim.  She had a screening colonoscopy 1/2/20 that showed a single polyp in the ascending colon which was removed with pathology showing benign  polypoid colonic mucosa with no evidence of adenoma.  Follow-up exam was recommended in 5 years.    Laboratory testing 5/31/24 prior to her annual surveillance visit showed an elevated CEA level of 10.16 ng/mL.  Testing was repeated on 6/6/24 with a level of 9.84 ng/mL.  CT C/A/P 6/10/24 showed a 7.8 cm lobulated soft tissue mass in the left upper lobe extending from the left hilum to the pleural margin.  There was a 1.5 cm right hilar node and small AP window nodes.  There was a stable nodular soft tissue density adjacent to the left uterine margin unchanged from 2019.  CT-guided biopsy 6/17/24 revealed a metastatic adenocarcinoma consistent with colorectal primary.  She had left on a trip to [place] when the results came back.  She arranged to be seen at [facility] while she was in [place].    Workup at [facility]  CT-PET scan 7/12/24:  Large DIAAN hypermetabolic mass measuring up to 8.1 cm with central necrosis.  Multiple hypermetabolic hepatic metastases.  MRI abdomen 7/12/24:  At least 7 bipolar hepatic metastases, largest 3.0 cm segment MIGUEL.  MRI brain 7/12/24:  No metastatic disease.    Molecular testing:  HER2 negative.  Pathology QNS for additional molecular studies.    [facility] peripheral blood 8/7/24: +KRAS G12D, FRANCESCO.  Positive mutations of APC, PTEN, TP53 and FBXW7    She was started on palliative chemotherapy with a regimen of FOLFIRI and Bevacizumab 7/31/24.  CT C/A/P 10/21/24: DIANA mass 5.9 x 5.4 cm (previous 6.9 x 7.3 cm), left hilar LN 1.5 x 1.1 cm (previous 1.8 x 1.5 cm).  Multiple hypodense liver metastases, largest lesion left hepatic lobe 4.9 cm.        CT C/A/P 12/19/24:  Stable DIANA mass 5.8 x 5.2 cm.  Bilobar hepatic metastases, several showing slight interval enlargement.  Right adrenal nodule 1.2 cm (previous 1 cm).    CT-guided liver biopsy 1/13/25:  Metastatic adenocarcinoma consistent with colorectal primary (CK20 and CDX2+)    [facility]orectal Liver Metast[facility] 2/13/25:   Minimal disease progression on imaging 10/24 to 12/24 with slight enlargement of multiple liver lesions and stable appearance of the lung and adrenal metastases.  Recommend transitioning to FOLFOX + Bevacizumab.    CT C/A/P 4/11/25:  DIANA mass decreased 5.0 x 4.8 cm (previous 5.8 x 5.2 cm).  Progressed hepatic metastatic disease, examples anterior left hepatic lobe 4.2 x 4.0 cm (previous 2.7 x 2.3 cm), lateral right hepatic lobe 4.7 x 4.2 cm (previous 3.1 x 2.5 cm), multiple new hepatic metastases.  Right adrenal metastases 2.9 x 1.4 cm (previous 1.6 x 1.1 cm).    Y-90 chemoembolization lateral left hepatic artery 6/10/25.  She was hospitalized 6/21-6/26/25 after presenting with progressive generalized weakness initially all felt to be secondary to her recent chemoembolization.  She received outpatient IV hydration with transient improvement in her symptoms.  Following admission to the hospital, she was noted to have greater weakness on the right side.  CTA head and neck showed a 2.1 cm enhancing lesion in the left posterior frontal lobe with significant vasogenic edema.  She was started on IV Decadron with improvement in her neurologic symptoms.  Radiation oncology was consulted for SBRT.    Interval History  She returns to the office today for a hospital follow-up visit accompanied by her wife.  She is ambulatory with a walker.  Her right-sided strength has gradually improved since discharge from the hospital.  She is doing physical therapy at home.  She received her 3rd and final fraction of stereotactic radiation to the left frontal metastasis today.  Treatment was well tolerated.  She denies any abdominal pain, tenderness or altered bowel habits.  She denies any headaches.  She has had no evidence of seizures.  A gradual Decadron taper was provided her by Radiation Oncology.  She was scheduled for a follow-up Y-90 treatment 7/14/25.       Review of Systems   Constitutional:  Positive for activity change and  "fatigue (Improving). Negative for appetite change and fever.   HENT:  Negative for nasal congestion, mouth sores, sore throat and trouble swallowing.    Eyes: Negative.    Respiratory:  Negative for cough and shortness of breath.    Cardiovascular:  Positive for leg swelling (improved). Negative for chest pain and palpitations.   Gastrointestinal:  Negative for abdominal distention, abdominal pain, constipation, diarrhea and nausea.   Genitourinary:  Negative for dysuria, flank pain and frequency.   Musculoskeletal:  Negative for arthralgias and back pain.   Integumentary:  Negative for pallor and rash.   Neurological:  Positive for weakness (right-sided, improving) and coordination difficulties (mild). Negative for dizziness, seizures, numbness and headaches.   Hematological:  Negative for adenopathy. Does not bruise/bleed easily.   Psychiatric/Behavioral: Negative.         PMHx:  Endometriosis, anxiety/depression  PSHx:  Tonsils, sinus surgery, right oophorectomy, partial colectomy  SH:  Lifetime nonsmoker, occasional alcohol use.  Lives in [place] with her significant other.  Works as a .  FH:  Mother had kidney cancer.  A maternal aunt and 1st cousin had breast cancer.    Objective:        /70   Pulse 89   Temp 97.9 °F (36.6 °C)   Resp 18   Ht 5' 7.01" (1.702 m)   Wt 76 kg (167 lb 8 oz)   SpO2 96%   BMI 26.23 kg/m²    Physical Exam  Constitutional:       Comments: Well-developed white female in NAD   HENT:      Head: Normocephalic.      Mouth/Throat:      Mouth: Mucous membranes are moist.      Pharynx: Oropharynx is clear. No posterior oropharyngeal erythema.   Eyes:      General: No scleral icterus.     Extraocular Movements: Extraocular movements intact.      Pupils: Pupils are equal, round, and reactive to light.   Cardiovascular:      Rate and Rhythm: Normal rate and regular rhythm.      Heart sounds: No murmur heard.     Comments: MediPort catheter right chest wall " and MediPort removal incision left chest wall.  Pulmonary:      Comments: Lungs clear to auscultation  Abdominal:      General: Bowel sounds are normal. There is no distension.      Palpations: Abdomen is soft.      Tenderness: There is no abdominal tenderness.      Comments: Well-healed midline incision below umbilicus and laparoscopic sites. No palpable masses or organomegaly.   Musculoskeletal:         General: No swelling or tenderness. Normal range of motion.      Cervical back: Neck supple. No tenderness.   Skin:     General: Skin is warm and dry.      Findings: No rash.   Neurological:      Mental Status: She is alert and oriented to person, place, and time.      Cranial Nerves: No cranial nerve deficit.      Motor: Weakness (mild right-sided weakness, ambulatory with a walker) present.       ECOG SCORE    2 - Capable of all selfcare but unable to carry out any work activities, active > 50% of hours          LABORATORY  Recent Results (from the past week)   CBC with Differential    Collection Time: 07/07/25 10:55 AM   Result Value Ref Range    WBC 26.27 (H) 4.50 - 11.50 x10(3)/mcL    RBC 3.76 (L) 4.20 - 5.40 x10(6)/mcL    Hgb 10.0 (L) 12.0 - 16.0 g/dL    Hct 32.0 (L) 37.0 - 47.0 %    MCV 85.1 80.0 - 94.0 fL    MCH 26.6 (L) 27.0 - 31.0 pg    MCHC 31.3 (L) 33.0 - 36.0 g/dL    RDW 22.1 (H) 11.5 - 17.0 %    Platelet 567 (H) 130 - 400 x10(3)/mcL    MPV 8.5 7.4 - 10.4 fL    Neut % 89.5 %    Lymph % 1.5 %    Mono % 6.9 %    Eos % 0.0 %    Basophil % 0.0 %    Imm Grans % 2.1 %    Neut # 23.50 (H) 2.1 - 9.2 x10(3)/mcL    Lymph # 0.39 (L) 0.6 - 4.6 x10(3)/mcL    Mono # 1.82 (H) 0.1 - 1.3 x10(3)/mcL    Eos # 0.01 0 - 0.9 x10(3)/mcL    Baso # 0.01 <=0.2 x10(3)/mcL    Imm Gran # 0.54 (H) 0.00 - 0.04 x10(3)/mcL                      8/26/24   9/18/24   9/23/24   11/13/24   12/23/24   3/18/25   4/16/25  CEA        23.9        17.1        16.0         11.8          11.7         52.0        18.6    Assessment:   Metastatic  rectal cancer  Left posterior frontal brain met with right-sided weakness  Leukocytosis secondary to steroids  Anemia  Reactive thrombocytosis - improving      Plan:   Neurologic symptoms secondary to her solitary brain metastases are gradually improving.  Stereotactic radiation therapy completed today.  Continue Decadron taper per Radiation Oncology.    Update laboratory testing today.  Continue home health and physical therapy.  Increase activity as tolerated.  Will discuss case further with Interventional Radiology.  Most likely will need to postpone her next Y-90 until she is more fully recovered.  I will plan to re-evaluate her in 2-3 weeks.      TIM HARDY MD     Other Physicians  Dr. Jaimee Lozano

## 2025-07-10 ENCOUNTER — TELEPHONE (OUTPATIENT)
Dept: HEMATOLOGY/ONCOLOGY | Facility: CLINIC | Age: 60
End: 2025-07-10
Payer: COMMERCIAL

## 2025-07-10 NOTE — TELEPHONE ENCOUNTER
[facility] called requesting records on patient for there palliative program. Records fax for review

## 2025-07-15 ENCOUNTER — TELEPHONE (OUTPATIENT)
Dept: INTERNAL MEDICINE | Facility: CLINIC | Age: 60
End: 2025-07-15

## 2025-07-15 NOTE — TELEPHONE ENCOUNTER
Copied from CRM #[id number]. Topic: General Inquiry - Patient Advice  >> Jul 15, 2025  4:04 PM Vero wrote:  .Who Called: Kristina Sagastume (nurse)    Caller is requesting assistance/information from provider's office.    Symptoms (please be specific): N/A   How long has patient had these symptoms:  N/A  List of preferred pharmacies on file (remove unneeded): [unfilled]  If different, enter pharmacy into here including location and phone number: N/A    Preferred Method of Contact: Phone Call    Patient's Preferred Phone Number on File: [phone]     Best Call Back Number, if different: [phone] (Kristina Sagastume)    Additional Information: Called to report pt has been in bed all day, experiencing crackling in lungs and low oxygen. Stated she concerned about pt's lungs sound awful and her low oxygen. Requesting a cb. Please advise, thank you.

## 2025-07-15 NOTE — TELEPHONE ENCOUNTER
Spoke with Kristina Sagastume Pt HH nurse. She stated Pt lungs are crackling, she feels she has bharat upper lobe crackling. Ms Sagastume also stated Pt O2 level was at 92 to 90. Spoke with Pt, informed her of my conversation with Ms Sagastume. Suggested Pt come in for an appt, Pt agreed and we made an appt for Pt tomorrow. Did stated to Pt if her symptoms worsened, she should go to the ED, Pt expressed understanding.

## 2025-07-16 ENCOUNTER — OFFICE VISIT (OUTPATIENT)
Dept: INTERNAL MEDICINE | Facility: CLINIC | Age: 60
End: 2025-07-16
Payer: COMMERCIAL

## 2025-07-16 VITALS
RESPIRATION RATE: 16 BRPM | BODY MASS INDEX: 26.23 KG/M2 | HEART RATE: 110 BPM | DIASTOLIC BLOOD PRESSURE: 60 MMHG | SYSTOLIC BLOOD PRESSURE: 88 MMHG | OXYGEN SATURATION: 96 % | HEIGHT: 67 IN

## 2025-07-16 DIAGNOSIS — R00.0 TACHYCARDIA: ICD-10-CM

## 2025-07-16 DIAGNOSIS — I95.9 HYPOTENSION, UNSPECIFIED HYPOTENSION TYPE: ICD-10-CM

## 2025-07-16 DIAGNOSIS — R17 JAUNDICE: ICD-10-CM

## 2025-07-16 DIAGNOSIS — Z85.040 HISTORY OF MALIGNANT CARCINOID TUMOR OF RECTUM: Primary | ICD-10-CM

## 2025-07-16 DIAGNOSIS — R62.7 FAILURE TO THRIVE IN ADULT: ICD-10-CM

## 2025-07-16 DIAGNOSIS — C78.01 MALIGNANT NEOPLASM METASTATIC TO RIGHT LUNG: ICD-10-CM

## 2025-07-16 DIAGNOSIS — C78.7 SECONDARY MALIGNANT NEOPLASM OF LIVER: ICD-10-CM

## 2025-07-16 NOTE — PROGRESS NOTES
Internal Medicine      Patient Name:  Alondra Snyder  Patient ID:  [id number]    Chief Complaint:  low o2      Alondra Snyder is a 60 y.o. female, known to Dr Maher, is here today for requested visit.  Medical comorbidities include colorectal cancer with metastasis to the lung, liver, and brain.  Other comorbidities include anxiety, HLD.    Home health nurse contacted office yesterday stating patient had crackling lung sounds and low O2 sat on room air.  Appointment was scheduled for evaluation.  Today, patient reports feeling extremely weak and dry cough for the past couple of days.  States occasionally she has clear sputum after coughing.  Has tried Tussionex that was previously prescribed, with no significant improvement.  She denies fever, chills, chest tightness, or difficulty breathing.    Of note, she does report 2 recent falls last week.  States she lost her balance resulting in the falls.  She states this occurred prior to current generalized weakness.    Last AWV:  01/09/25     Considering patient's presentation and her diagnosis I took over patient's visit from the nurse practitioner.    Long conversation with the patient as well as her wife who was present regarding patient's ongoing decline in her appropriateness to transition from palliative care to hospice.  Goals of care comfort focused only.  Hospice company of their choice was notified  Provided emotional encouragement and advised patient to call if there are any questions or concerns.  We will continue to keep patient and her family in our prayers  Past Medical History:   Diagnosis Date    Anxiety disorder, unspecified     Carpal tunnel syndrome     Malignant neoplasm of rectum     Rectal carcinoma         Past Surgical History:   Procedure Laterality Date    ADENOIDECTOMY  1968    BREAST SURGERY  2010    benign cyst removed    COLON RESECTION      COLON SURGERY  2015    COLONOSCOPY      HYSTERECTOMY  1993    only had one ovary removed, due  "to endometriosis    removal of right ovary Right     SINUS SURGERY      TONSILLECTOMY  1968        Social History     Tobacco Use    Smoking status: Never    Smokeless tobacco: Never   Substance and Sexual Activity    Alcohol use: Not Currently     Alcohol/week: 2.0 standard drinks of alcohol    Drug use: Never    Sexual activity: Not Currently     Partners: Female        Current Outpatient Medications   Medication Instructions    dexAMETHasone (DECADRON) 4 mg, Oral, Every 8 hours    hydrocodone-chlorpheniramine (TUSSIONEX) 10-8 mg/5 mL suspension 5 mLs, Oral, Every 12 hours PRN    LIDOcaine-prilocaine (EMLA) cream Apply to mediport site approximately 30 minutes prior to access.    metoclopramide HCl (REGLAN) 10 mg, Oral, Before meals & nightly    omeprazole (PRILOSEC) 40 mg, Oral, Daily    sertraline (ZOLOFT) 100 mg, Daily    sucralfate (CARAFATE) 1 g, Oral, Before meals & nightly    traMADoL (ULTRAM) 50 mg, Oral, Every 8 hours PRN       Review of patient's allergies indicates:  No Known Allergies     Patient Care Team:  Jaimee Maher MD as PCP - General (Internal Medicine)  Jamal Sanches MD as Consulting Physician (Obstetrics and Gynecology)  Robert Guillen MD as Consulting Physician (Gastroenterology)  Ricardo Cooper MD as Consulting Physician (Colon and Rectal Surgery)  Ricardo Hernandez MD as Consulting Physician (Oncology)  Gege Layne LPN as Care Coordinator  Jeevan Carrillo Mammography -       Subjective:    Review of Systems    12 point review of systems conducted, negative except as stated in the history of present illness. See HPI for details.      Objective:    Visit Vitals  BP (!) 88/60   Pulse 110   Resp 16   Ht 5' 7" (1.702 m)   SpO2 96%   BMI 26.23 kg/m²       Physical Exam  Vitals and nursing note reviewed.   Constitutional:       General: She is not in acute distress.     Appearance: She is ill-appearing. She is not diaphoretic.   HENT:      Head: Normocephalic and " atraumatic.      Nose: No congestion.      Mouth/Throat:      Mouth: Mucous membranes are moist.      Pharynx: Oropharynx is clear.   Eyes:      General: No scleral icterus.     Extraocular Movements: Extraocular movements intact.      Conjunctiva/sclera: Conjunctivae normal.      Pupils: Pupils are equal, round, and reactive to light.   Cardiovascular:      Rate and Rhythm: Regular rhythm. Tachycardia present.      Heart sounds: Normal heart sounds. No murmur heard.  Pulmonary:      Effort: Pulmonary effort is normal. No respiratory distress.      Breath sounds: Rhonchi (bilateral lower (R>L)) present. No wheezing.   Abdominal:      General: Bowel sounds are normal. There is no distension.      Palpations: Abdomen is soft. There is no mass.      Tenderness: There is no abdominal tenderness.   Musculoskeletal:         General: Normal range of motion.      Cervical back: Normal range of motion and neck supple.   Lymphadenopathy:      Cervical: No cervical adenopathy.   Skin:     General: Skin is warm and dry.      Capillary Refill: Capillary refill takes less than 2 seconds.      Coloration: Skin is jaundiced and pale.   Neurological:      General: No focal deficit present.      Mental Status: She is alert and oriented to person, place, and time.      Motor: Weakness (Generalized weakness) present.   Psychiatric:         Mood and Affect: Mood normal.         Behavior: Behavior normal.       Labs Reviewed:    Chemistry:  Lab Results   Component Value Date     (L) 07/14/2025    K 4.3 07/14/2025    BUN 17.6 07/14/2025    CREATININE 0.50 (L) 07/14/2025    EGFRNORACEVR >60 07/14/2025    CALCIUM 9.4 07/14/2025    ALKPHOS 1,735 (H) 07/14/2025    ALBUMIN 2.2 (L) 07/14/2025    BILIDIR 0.10 03/12/2020    IBILI 0.60 03/12/2020     (H) 07/14/2025    ALT 83 (H) 07/14/2025    MG 1.70 06/24/2025    TSH 1.793 01/04/2024        Lab Results   Component Value Date    HGBA1C 5.7 01/04/2024        Hematology:  Lab Results    Component Value Date    WBC 26.27 (H) 07/07/2025    HGB 10.0 (L) 07/07/2025    HCT 32.0 (L) 07/07/2025     (H) 07/07/2025       Lipid Panel:  Lab Results   Component Value Date    CHOL 247 (H) 01/04/2024    HDL 45 01/04/2024    .00 (H) 01/04/2024    TRIG 66 01/04/2024    TOTALCHOLEST 5 01/04/2024        Urine:  Lab Results   Component Value Date    APPEARANCEUA Turbid (A) 06/21/2025    SGUA 1.015 06/21/2025    PROTEINUA 1+ (A) 06/21/2025    KETONESUA Negative 06/21/2025    LEUKOCYTESUR 75 (A) 06/21/2025    RBCUA 21-50 (A) 06/21/2025    WBCUA 11-20 (A) 06/21/2025    BACTERIA Occasional (A) 06/21/2025    SQEPUA Trace 06/21/2025    HYALINECASTS 3-5 (A) 06/21/2025          Assessment:      ICD-10-CM ICD-9-CM   1. History of malignant carcinoid tumor of rectum  Z85.040 V10.06   2. Malignant neoplasm metastatic to right lung  C78.01 197.0   3. Secondary malignant neoplasm of liver  C78.7 197.7   4. Jaundice  R17 782.4   5. Tachycardia  R00.0 785.0   6. Hypotension, unspecified hypotension type  I95.9 458.9   7. Failure to thrive in adult  R62.7 783.7        Plan:    Patient seen by Dr Maher.  Given extensive history and recent deterioration, hospice care was discussed with the patient and her spouse.  Both are agreeable to move forward with hospice.  Referral was sent to [facility].        No follow-ups on file. In addition to their scheduled follow up, the patient has also been instructed to follow up on as needed basis.       Future Appointments   Date Time Provider Department Center   9/2/2025 12:15 PM [facility] MRI1 [facility]   10/22/2025 11:40 AM Jaimee Maher MD [place] [id number]   1/13/2026  9:20 AM Jaimee Maher MD [place] [id number]          Jaimee Maher MD

## 2025-08-11 PROBLEM — R53.1 WEAKNESS: Status: RESOLVED | Noted: 2025-06-26 | Resolved: 2025-08-11

## 2025-08-19 ENCOUNTER — DOCUMENT SCAN (OUTPATIENT)
Dept: HOME HEALTH SERVICES | Facility: HOSPITAL | Age: 60
End: 2025-08-19
Payer: COMMERCIAL

## 2025-08-20 ENCOUNTER — EXTERNAL HOME HEALTH (OUTPATIENT)
Dept: HOME HEALTH SERVICES | Facility: HOSPITAL | Age: 60
End: 2025-08-20
Payer: COMMERCIAL